# Patient Record
Sex: FEMALE | Race: ASIAN | NOT HISPANIC OR LATINO | Employment: STUDENT | URBAN - METROPOLITAN AREA
[De-identification: names, ages, dates, MRNs, and addresses within clinical notes are randomized per-mention and may not be internally consistent; named-entity substitution may affect disease eponyms.]

---
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
## 2019-03-18 ENCOUNTER — HOSPITAL ENCOUNTER (OUTPATIENT)
Dept: GENERAL RADIOLOGY | Facility: CLINIC | Age: 19
End: 2019-03-18
Attending: PEDIATRICS
Payer: COMMERCIAL

## 2019-03-18 ENCOUNTER — OFFICE VISIT (OUTPATIENT)
Dept: RHEUMATOLOGY | Facility: CLINIC | Age: 19
End: 2019-03-18
Attending: PEDIATRICS
Payer: COMMERCIAL

## 2019-03-18 ENCOUNTER — HOSPITAL ENCOUNTER (OUTPATIENT)
Dept: GENERAL RADIOLOGY | Facility: CLINIC | Age: 19
Discharge: HOME | End: 2019-03-18
Attending: PEDIATRICS | Admitting: PEDIATRICS
Payer: COMMERCIAL

## 2019-03-18 VITALS
DIASTOLIC BLOOD PRESSURE: 77 MMHG | WEIGHT: 138.45 LBS | TEMPERATURE: 98.2 F | HEART RATE: 78 BPM | SYSTOLIC BLOOD PRESSURE: 122 MMHG | BODY MASS INDEX: 24.53 KG/M2 | HEIGHT: 63 IN

## 2019-03-18 DIAGNOSIS — M05.79 RHEUMATOID ARTHRITIS INVOLVING MULTIPLE SITES WITH POSITIVE RHEUMATOID FACTOR (H): Chronic | ICD-10-CM

## 2019-03-18 DIAGNOSIS — M05.79 RHEUMATOID ARTHRITIS INVOLVING MULTIPLE SITES WITH POSITIVE RHEUMATOID FACTOR (H): Primary | Chronic | ICD-10-CM

## 2019-03-18 LAB
ALBUMIN SERPL-MCNC: 3.9 G/DL (ref 3.4–5)
ALP SERPL-CCNC: 65 U/L (ref 40–150)
ALT SERPL W P-5'-P-CCNC: 13 U/L (ref 0–50)
AST SERPL W P-5'-P-CCNC: 13 U/L (ref 0–35)
BASOPHILS # BLD AUTO: 0.1 10E9/L (ref 0–0.2)
BASOPHILS NFR BLD AUTO: 1.2 %
BILIRUB DIRECT SERPL-MCNC: 0.1 MG/DL (ref 0–0.2)
BILIRUB SERPL-MCNC: 0.4 MG/DL (ref 0.2–1.3)
CREAT SERPL-MCNC: 0.65 MG/DL (ref 0.5–1)
CRP SERPL-MCNC: <2.9 MG/L (ref 0–8)
DIFFERENTIAL METHOD BLD: NORMAL
EOSINOPHIL # BLD AUTO: 0.2 10E9/L (ref 0–0.7)
EOSINOPHIL NFR BLD AUTO: 3 %
ERYTHROCYTE [DISTWIDTH] IN BLOOD BY AUTOMATED COUNT: 12.2 % (ref 10–15)
ERYTHROCYTE [SEDIMENTATION RATE] IN BLOOD BY WESTERGREN METHOD: 28 MM/H (ref 0–20)
GFR SERPL CREATININE-BSD FRML MDRD: >90 ML/MIN/{1.73_M2}
HCT VFR BLD AUTO: 41 % (ref 35–47)
HGB BLD-MCNC: 13.4 G/DL (ref 11.7–15.7)
IMM GRANULOCYTES # BLD: 0 10E9/L (ref 0–0.4)
IMM GRANULOCYTES NFR BLD: 0.2 %
LYMPHOCYTES # BLD AUTO: 2 10E9/L (ref 0.8–5.3)
LYMPHOCYTES NFR BLD AUTO: 30.9 %
MCH RBC QN AUTO: 29.4 PG (ref 26.5–33)
MCHC RBC AUTO-ENTMCNC: 32.7 G/DL (ref 31.5–36.5)
MCV RBC AUTO: 90 FL (ref 78–100)
MONOCYTES # BLD AUTO: 0.7 10E9/L (ref 0–1.3)
MONOCYTES NFR BLD AUTO: 10.8 %
NEUTROPHILS # BLD AUTO: 3.5 10E9/L (ref 1.6–8.3)
NEUTROPHILS NFR BLD AUTO: 53.9 %
NRBC # BLD AUTO: 0 10*3/UL
NRBC BLD AUTO-RTO: 0 /100
PLATELET # BLD AUTO: 311 10E9/L (ref 150–450)
PROT SERPL-MCNC: 8.7 G/DL (ref 6.8–8.8)
RBC # BLD AUTO: 4.56 10E12/L (ref 3.8–5.2)
WBC # BLD AUTO: 6.6 10E9/L (ref 4–11)

## 2019-03-18 PROCEDURE — 73120 X-RAY EXAM OF HAND: CPT | Mod: 50,52

## 2019-03-18 PROCEDURE — 85652 RBC SED RATE AUTOMATED: CPT | Performed by: PEDIATRICS

## 2019-03-18 PROCEDURE — 82565 ASSAY OF CREATININE: CPT | Performed by: PEDIATRICS

## 2019-03-18 PROCEDURE — 86431 RHEUMATOID FACTOR QUANT: CPT | Performed by: PEDIATRICS

## 2019-03-18 PROCEDURE — 86140 C-REACTIVE PROTEIN: CPT | Performed by: PEDIATRICS

## 2019-03-18 PROCEDURE — 80076 HEPATIC FUNCTION PANEL: CPT | Performed by: PEDIATRICS

## 2019-03-18 PROCEDURE — 73620 X-RAY EXAM OF FOOT: CPT | Mod: 50,52

## 2019-03-18 PROCEDURE — G0463 HOSPITAL OUTPT CLINIC VISIT: HCPCS | Mod: ZF

## 2019-03-18 PROCEDURE — 85025 COMPLETE CBC W/AUTO DIFF WBC: CPT | Performed by: PEDIATRICS

## 2019-03-18 PROCEDURE — 36415 COLL VENOUS BLD VENIPUNCTURE: CPT | Performed by: PEDIATRICS

## 2019-03-18 PROCEDURE — 82784 ASSAY IGA/IGD/IGG/IGM EACH: CPT | Performed by: PEDIATRICS

## 2019-03-18 PROCEDURE — 86200 CCP ANTIBODY: CPT | Performed by: PEDIATRICS

## 2019-03-18 ASSESSMENT — PAIN SCALES - GENERAL: PAINLEVEL: NO PAIN (0)

## 2019-03-18 ASSESSMENT — MIFFLIN-ST. JEOR: SCORE: 1378.87

## 2019-03-18 NOTE — NURSING NOTE
"Chief Complaint   Patient presents with     RECHECK     Follow up GENE     /77 (BP Location: Right arm, Patient Position: Sitting, Cuff Size: Adult Regular)   Pulse 78   Temp 98.2  F (36.8  C) (Oral)   Ht 5' 3.43\" (161.1 cm)   Wt 138 lb 7.2 oz (62.8 kg)   BMI 24.20 kg/m    Jodi Arnold LPN    "

## 2019-03-18 NOTE — LETTER
3/18/2019      RE: Geri [facility]OMSON [facility]  [address]  [place] [address]           Rheumatology History:   Date of symptom onset:  2/14/2016  Date of first visit to center:  3/7/2016  Date of GENE diagnosis:  3/7/2016  ILAR category:  polyarticular (RF-positive)  KAYLEEN Status:  . 3/18/2019   KAYLEEN Status Positive     RF Status:  . 3/18/2019   Rheumatoid Factor Status Positive     HLA-B27 Status:  . 3/18/2019   HLA-B27 Status Not tested           Ophthalmology History:   Iritis/Uveitis Comorbidity:  . 3/18/2019   (COIN) Iritis/Uveitis comorbidity? No     Date of last eye exam: 12/17/2018  In compliance with eye screening (y/n):  Yes         Medications:   As of completion of this visit:  Current Outpatient Medications   Medication Sig Dispense Refill     Multiple Vitamins-Minerals (MULTIVITAMIN PO)        Omega-3 Fatty Acids (OMEGA 3 PO)        Etanercept (ENBREL SURECLICK) 50 MG/ML autoinjector Inject 50 mg Subcutaneous once a week (Patient not taking: Reported on 3/18/2019) 4 mL 11     folic acid (FOLVITE) 1 MG tablet Take 1 tablet (1 mg) by mouth daily (Patient not taking: Reported on 11/21/2018) 30 tablet 11     methotrexate 2.5 MG tablet CHEMO Take 6 tablets (15 mg) by mouth once a week (Patient not taking: Reported on 3/18/2019) 24 tablet 3     Date of last TB Screen:  7/30/2018         Allergies:   No Known Allergies        Problem list:     Patient Active Problem List    Diagnosis Date Noted     Cyclic citrullinated peptide (CCP) antibody positive 03/07/2016     Positive antinuclear antibody 03/07/2016     High total IgG 03/07/2016     Elevated rheumatoid factor 02/24/2016     Rheumatoid arthritis involving multiple sites with positive rheumatoid factor (H) 02/24/2016            Subjective:   Geri is a 18 year old female who was seen in Pediatric Rheumatology clinic today for follow up.  Geri was last seen in our clinic on 11/21/2018 and returns today accompanied by her parents.  The encounter diagnosis was  "Rheumatoid arthritis involving multiple sites with positive rheumatoid factor (H).      Goals for the visit include discussion of medications.  Prescribed medications were discontinued by late November or early December.  She did well until late January or early February when she had some breakthrough symptoms involving her left elbow, left index finger, and left ankle, including swelling.  She took 1 dose of methotrexate and has done well since that time.    She had a follow-up examination of her eyes and things were reassuring.  Is otherwise been doing great.  She completed our comprehensive Review of Systems form and it is otherwise negative.  College continues to go well.    Information per our standardized questionnaire is as below:   Self Report  (COIN) Patient Pain Status: 0  (COIN) Patient Global Assessment Of Disease Activity: 1  Score Reported By: Self  (COIN) Patient Highest Level Of Education: some college or technical school  Arthritis History  (COIN) Morning stiffness in the past week: no stiffness  Has your arthritis stopped from trying any athletic or rigorous activities, or interfaced with your ability to do these activities: No  Have you been limited your ability to do normal daily activities in the past week: No  Did you needed help from other people to do normal activities in the past week: No  Have you used any aids or devices to help you do normal daily activities in the past week: No  Important Medical Events  (COIN) Patient has experienced drug-related serious adverse events since last encounter?: No         Examination:   Blood pressure 122/77, pulse 78, temperature 98.2  F (36.8  C), temperature source Oral, height 1.611 m (5' 3.43\"), weight 62.8 kg (138 lb 7.2 oz).  68 %ile based on CDC (Girls, 2-20 Years) weight-for-age data based on Weight recorded on 3/18/2019.  Blood pressure percentiles are not available for patients who are 18 years or older.    Geri appears generally well and in " good spirits.  Head: Normal head and hair.  Eyes: No scleral injection, pupils normal.  Ears: Normal external structures, tympanic membranes.  Nose: No cartilage deformity, congestion.  Mouth: Normal teeth, gums, tongue, mucosa.  Throat: Normal, without erythema or exudate.  Neck: Normal, without thyromegaly  Nodes: No cervical, supraclavicular, axillary, inguinal adenopathy.  Lungs: Normal effort, clear to ausculation.  Heart: Regular rate and rhythm, S1 and S2, no murmurs; normal peripheral pulses and perfusion.  Abdomen: Soft, non-tender, without hepatomegaly, splenomegaly, or masses.  Skin: No inflammatory lesions.  Normal scratches and bruises.  Nails: No pitting, infection.  Neurological: Alert, appropriately interactive, normal cranial nerves, no deficits, normal gait walking.   Musculoskeletal: No evidence of current synovitis of the cervical spine, TMJ, sternoclavicular, acromioclavicular, glenohumeral, elbow, wrist, lumbar spine, hip, knee, ankle joints. No tendonitis or bursitis.     Axial Skeleton  (COIN) Sacroiliac tenderness:: No  (COIN) Positive ASHLEY test:: No  (COIN) Modified Schober's Test:: No  Upper Extremity  Thumb MCP: R Swollen  Index MCP: R Swollen  Index PIP: R Swollen;L Swollen  Middle MCP: R Swollen  Lower Extremity  Third PIP: L Swollen  Fourth PIP: R Swollen  Entheses  (COIN) Tender Entheses count: 0    Total active joints:  2(only toe findings are definite)  Total limited joints:  0  Tender entheses count:  0           Assessment:     Rheumatoid factor and CCP antibody positive rheumatoid arthritis, with a history of erosive changes.  I think it is time to get a new set of baseline films and make sure that there has not been progression that might warrant immediate start of therapy, as well as to provide a baseline for comparison down the road.  I think getting laboratory studies to look at immune activation and inflammation will also be important.  We know that she is continued to make  CCP antibody at a very high level, but her rheumatoid factor has been low, her serum IgG has been normal, and the nonspecific markers of inflammation such as C-reactive protein and sedimentation rate have been reassuringly normal.  If there is been a significant change in any of these, whether radiographs or labs, we may need to consider restarting therapy even though she feels fine.      Again, I reviewed that that this particular form of arthritis has only about 2% of patients were able to stop their therapy for a prolonged period of time successfully without a recurrence of disease or progression.  I reviewed the progression can be clinically silent and only apparent radiographically for significant periods of time, but there will be unwelcome consequences if there is prolonged poor control.    Change Since Last Visit: Somewhat Worse  ACR Functional Class: Normal  (COIN) Provider Global Assessment Of Disease Activity: 1  (This is measured on the scale of 0 - 10)  [id number]  (This is measured on the scale of 0 - 30)  (COIN) On Medication For Treatment Of GENE?: No  Health counseling reviewed:  treatment adherence       Plan:     1. Laboratory monitoring today to assess inflammation and immune activation.  2. Radiographs today of the areas that have previously been affected and which have suspicious findings today..  3. Continue eye examinations for uveitis monitoring every 12 months.  4. Physical activity as tolerated.  What one can do tells us how well someone is doing, and is healthy for individuals with arthritis.   5. Medications will not be restarted for the time being.  6. Follow-up in 5-6 months.       If there are any new questions or concerns, I would be glad to help and can be reached through our main office at [phone] or our paging  at [phone].    Hardy Reese MD         Addendum:  Imaging Results:     Recent Results (from the past 24 hour(s))   XR Hand Bilateral 1 vw (AP)     Narrative    Exam: XR HAND BILATERAL 1 VW  3/18/2019 11:21 AM      History: Evaluate for erosions; Rheumatoid arthritis involving  multiple sites with positive rheumatoid factor (H)    Comparison: 3/29/2017. 3/7/2016    Findings: PA view of the hands and wrists. Patient is skeletally  mature. There is no fracture or acute osseous abnormality. There are  symmetric irregularities along the waist of the scaphoid, likely  normal variation. Lucency in the right second metacarpal head is  unchanged. No soft tissue swelling.      Impression    Impression: Stable radiograph of the hands. No acute osseous  abnormality.    VIPUL ROCA MD   XR Foot Bilateral 1 View    Narrative    Exam: XR FOOT BILATERAL 1 VW  3/18/2019 11:22 AM      History: Evaluate for erosions; Rheumatoid arthritis involving  multiple sites with positive rheumatoid factor (H)    Comparison: 3/29/2017 3/7/2016    Findings: AP view of the feet. Patient is skeletally mature. As seen  on the prior exam there are small periarticular erosions about the  right fifth metatarsal head. Defect along the medial aspect of the  first digit proximal phalanx is unchanged compared to 2016. No joint  space narrowing appreciated and no additional erosive change is  identified. Focal lucency in the distal aspect of the left fifth  metatarsal head is unchanged. No substantial soft tissue swelling.      Impression    Impression:   1. Continued erosive change of the fifth metatarsal head with stable  focal presumed erosion of the first digit proximal phalanx.  2. No new osseous abnormality.    VIPUL ROCA MD   These results are reassuring.          Addendum:  Laboratory Investigations:   Laboratory investigations performed today for which results were available at the time of this note are listed below.  Pending labs will be reported in a separate letter.  Results for orders placed or performed in visit on 03/18/19   CBC with platelets differential   Result Value Ref  Range    WBC 6.6 4.0 - 11.0 10e9/L    RBC Count 4.56 3.8 - 5.2 10e12/L    Hemoglobin 13.4 11.7 - 15.7 g/dL    Hematocrit 41.0 35.0 - 47.0 %    MCV 90 78 - 100 fl    MCH 29.4 26.5 - 33.0 pg    MCHC 32.7 31.5 - 36.5 g/dL    RDW 12.2 10.0 - 15.0 %    Platelet Count 311 150 - 450 10e9/L    Diff Method Automated Method     % Neutrophils 53.9 %    % Lymphocytes 30.9 %    % Monocytes 10.8 %    % Eosinophils 3.0 %    % Basophils 1.2 %    % Immature Granulocytes 0.2 %    Nucleated RBCs 0 0 /100    Absolute Neutrophil 3.5 1.6 - 8.3 10e9/L    Absolute Lymphocytes 2.0 0.8 - 5.3 10e9/L    Absolute Monocytes 0.7 0.0 - 1.3 10e9/L    Absolute Eosinophils 0.2 0.0 - 0.7 10e9/L    Absolute Basophils 0.1 0.0 - 0.2 10e9/L    Abs Immature Granulocytes 0.0 0 - 0.4 10e9/L    Absolute Nucleated RBC 0.0    Erythrocyte sedimentation rate auto   Result Value Ref Range    Sed Rate 28 (H) 0 - 20 mm/h   CRP inflammation   Result Value Ref Range    CRP Inflammation <2.9 0.0 - 8.0 mg/L   Hepatic panel   Result Value Ref Range    Bilirubin Direct 0.1 0.0 - 0.2 mg/dL    Bilirubin Total 0.4 0.2 - 1.3 mg/dL    Albumin 3.9 3.4 - 5.0 g/dL    Protein Total 8.7 6.8 - 8.8 g/dL    Alkaline Phosphatase 65 40 - 150 U/L    ALT 13 0 - 50 U/L    AST 13 0 - 35 U/L   Creatinine   Result Value Ref Range    Creatinine 0.65 0.50 - 1.00 mg/dL    GFR Estimate >90 >60 mL/min/[1.73_m2]    GFR Estimate If Black >90 >60 mL/min/[1.73_m2]      The preliminary results are notable only for a modest increase in inflammation as reflected by the sedimentation rate.        CC  Patient Care Team:  Elieser Sena MD as PCP - General (Pediatrics)  Hardy Reese MD as MD (Pediatric Rheumatology)  Cornelius Leonardo MD as MD (Ophthalmology)  ELIESER SENA    Copy to patient  Kaci Valadez,Delbert  [address] [place] [facility] [address]

## 2019-03-18 NOTE — PROGRESS NOTES
Rheumatology History:   Date of symptom onset:  2/14/2016  Date of first visit to center:  3/7/2016  Date of GENE diagnosis:  3/7/2016  ILAR category:  polyarticular (RF-positive)  KAYLEEN Status:  . 3/18/2019   KAYLEEN Status Positive     RF Status:  . 3/18/2019   Rheumatoid Factor Status Positive     HLA-B27 Status:  . 3/18/2019   HLA-B27 Status Not tested           Ophthalmology History:   Iritis/Uveitis Comorbidity:  . 3/18/2019   (COIN) Iritis/Uveitis comorbidity? No     Date of last eye exam: 12/17/2018  In compliance with eye screening (y/n):  Yes         Medications:   As of completion of this visit:  Current Outpatient Medications   Medication Sig Dispense Refill     Multiple Vitamins-Minerals (MULTIVITAMIN PO)        Omega-3 Fatty Acids (OMEGA 3 PO)        Etanercept (ENBREL SURECLICK) 50 MG/ML autoinjector Inject 50 mg Subcutaneous once a week (Patient not taking: Reported on 3/18/2019) 4 mL 11     folic acid (FOLVITE) 1 MG tablet Take 1 tablet (1 mg) by mouth daily (Patient not taking: Reported on 11/21/2018) 30 tablet 11     methotrexate 2.5 MG tablet CHEMO Take 6 tablets (15 mg) by mouth once a week (Patient not taking: Reported on 3/18/2019) 24 tablet 3     Date of last TB Screen:  7/30/2018         Allergies:   No Known Allergies        Problem list:     Patient Active Problem List    Diagnosis Date Noted     Cyclic citrullinated peptide (CCP) antibody positive 03/07/2016     Positive antinuclear antibody 03/07/2016     High total IgG 03/07/2016     Elevated rheumatoid factor 02/24/2016     Rheumatoid arthritis involving multiple sites with positive rheumatoid factor (H) 02/24/2016            Subjective:   Geri is a 18 year old female who was seen in Pediatric Rheumatology clinic today for follow up.  Geri was last seen in our clinic on 11/21/2018 and returns today accompanied by her parents.  The encounter diagnosis was Rheumatoid arthritis involving multiple sites with positive rheumatoid factor  "(H).      Goals for the visit include discussion of medications.  Prescribed medications were discontinued by late November or early December.  She did well until late January or early February when she had some breakthrough symptoms involving her left elbow, left index finger, and left ankle, including swelling.  She took 1 dose of methotrexate and has done well since that time.    She had a follow-up examination of her eyes and things were reassuring.  Is otherwise been doing great.  She completed our comprehensive Review of Systems form and it is otherwise negative.  College continues to go well.    Information per our standardized questionnaire is as below:   Self Report  (COIN) Patient Pain Status: 0  (COIN) Patient Global Assessment Of Disease Activity: 1  Score Reported By: Self  (COIN) Patient Highest Level Of Education: some college or technical school  Arthritis History  (COIN) Morning stiffness in the past week: no stiffness  Has your arthritis stopped from trying any athletic or rigorous activities, or interfaced with your ability to do these activities: No  Have you been limited your ability to do normal daily activities in the past week: No  Did you needed help from other people to do normal activities in the past week: No  Have you used any aids or devices to help you do normal daily activities in the past week: No  Important Medical Events  (COIN) Patient has experienced drug-related serious adverse events since last encounter?: No         Examination:   Blood pressure 122/77, pulse 78, temperature 98.2  F (36.8  C), temperature source Oral, height 1.611 m (5' 3.43\"), weight 62.8 kg (138 lb 7.2 oz).  68 %ile based on CDC (Girls, 2-20 Years) weight-for-age data based on Weight recorded on 3/18/2019.  Blood pressure percentiles are not available for patients who are 18 years or older.    Geri appears generally well and in good spirits.  Head: Normal head and hair.  Eyes: No scleral injection, pupils " normal.  Ears: Normal external structures, tympanic membranes.  Nose: No cartilage deformity, congestion.  Mouth: Normal teeth, gums, tongue, mucosa.  Throat: Normal, without erythema or exudate.  Neck: Normal, without thyromegaly  Nodes: No cervical, supraclavicular, axillary, inguinal adenopathy.  Lungs: Normal effort, clear to ausculation.  Heart: Regular rate and rhythm, S1 and S2, no murmurs; normal peripheral pulses and perfusion.  Abdomen: Soft, non-tender, without hepatomegaly, splenomegaly, or masses.  Skin: No inflammatory lesions.  Normal scratches and bruises.  Nails: No pitting, infection.  Neurological: Alert, appropriately interactive, normal cranial nerves, no deficits, normal gait walking.   Musculoskeletal: No evidence of current synovitis of the cervical spine, TMJ, sternoclavicular, acromioclavicular, glenohumeral, elbow, wrist, lumbar spine, hip, knee, ankle joints. No tendonitis or bursitis.     Axial Skeleton  (COIN) Sacroiliac tenderness:: No  (COIN) Positive ASHLEY test:: No  (COIN) Modified Schober's Test:: No  Upper Extremity  Thumb MCP: R Swollen  Index MCP: R Swollen  Index PIP: R Swollen;L Swollen  Middle MCP: R Swollen  Lower Extremity  Third PIP: L Swollen  Fourth PIP: R Swollen  Entheses  (COIN) Tender Entheses count: 0    Total active joints:  2(only toe findings are definite)  Total limited joints:  0  Tender entheses count:  0           Assessment:     Rheumatoid factor and CCP antibody positive rheumatoid arthritis, with a history of erosive changes.  I think it is time to get a new set of baseline films and make sure that there has not been progression that might warrant immediate start of therapy, as well as to provide a baseline for comparison down the road.  I think getting laboratory studies to look at immune activation and inflammation will also be important.  We know that she is continued to make CCP antibody at a very high level, but her rheumatoid factor has been low, her  serum IgG has been normal, and the nonspecific markers of inflammation such as C-reactive protein and sedimentation rate have been reassuringly normal.  If there is been a significant change in any of these, whether radiographs or labs, we may need to consider restarting therapy even though she feels fine.      Again, I reviewed that that this particular form of arthritis has only about 2% of patients were able to stop their therapy for a prolonged period of time successfully without a recurrence of disease or progression.  I reviewed the progression can be clinically silent and only apparent radiographically for significant periods of time, but there will be unwelcome consequences if there is prolonged poor control.    Change Since Last Visit: Somewhat Worse  ACR Functional Class: Normal  (COIN) Provider Global Assessment Of Disease Activity: 1  (This is measured on the scale of 0 - 10)  [id number]  (This is measured on the scale of 0 - 30)  (COIN) On Medication For Treatment Of GENE?: No  Health counseling reviewed:  treatment adherence       Plan:     1. Laboratory monitoring today to assess inflammation and immune activation.  2. Radiographs today of the areas that have previously been affected and which have suspicious findings today..  3. Continue eye examinations for uveitis monitoring every 12 months.  4. Physical activity as tolerated.  What one can do tells us how well someone is doing, and is healthy for individuals with arthritis.   5. Medications will not be restarted for the time being.  6. Follow-up in 5-6 months.       If there are any new questions or concerns, I would be glad to help and can be reached through our main office at [phone] or our paging  at [phone].    Hardy Reese MD         Addendum:  Imaging Results:     Recent Results (from the past 24 hour(s))   XR Hand Bilateral 1 vw (AP)    Narrative    Exam: XR HAND BILATERAL 1 VW  3/18/2019 11:21 AM      History: Evaluate  for erosions; Rheumatoid arthritis involving  multiple sites with positive rheumatoid factor (H)    Comparison: 3/29/2017. 3/7/2016    Findings: PA view of the hands and wrists. Patient is skeletally  mature. There is no fracture or acute osseous abnormality. There are  symmetric irregularities along the waist of the scaphoid, likely  normal variation. Lucency in the right second metacarpal head is  unchanged. No soft tissue swelling.      Impression    Impression: Stable radiograph of the hands. No acute osseous  abnormality.    VIPUL ROCA MD   XR Foot Bilateral 1 View    Narrative    Exam: XR FOOT BILATERAL 1 VW  3/18/2019 11:22 AM      History: Evaluate for erosions; Rheumatoid arthritis involving  multiple sites with positive rheumatoid factor (H)    Comparison: 3/29/2017 3/7/2016    Findings: AP view of the feet. Patient is skeletally mature. As seen  on the prior exam there are small periarticular erosions about the  right fifth metatarsal head. Defect along the medial aspect of the  first digit proximal phalanx is unchanged compared to 2016. No joint  space narrowing appreciated and no additional erosive change is  identified. Focal lucency in the distal aspect of the left fifth  metatarsal head is unchanged. No substantial soft tissue swelling.      Impression    Impression:   1. Continued erosive change of the fifth metatarsal head with stable  focal presumed erosion of the first digit proximal phalanx.  2. No new osseous abnormality.    VIPUL ROCA MD   These results are reassuring.          Addendum:  Laboratory Investigations:   Laboratory investigations performed today for which results were available at the time of this note are listed below.  Pending labs will be reported in a separate letter.  Results for orders placed or performed in visit on 03/18/19   CBC with platelets differential   Result Value Ref Range    WBC 6.6 4.0 - 11.0 10e9/L    RBC Count 4.56 3.8 - 5.2 10e12/L    Hemoglobin  13.4 11.7 - 15.7 g/dL    Hematocrit 41.0 35.0 - 47.0 %    MCV 90 78 - 100 fl    MCH 29.4 26.5 - 33.0 pg    MCHC 32.7 31.5 - 36.5 g/dL    RDW 12.2 10.0 - 15.0 %    Platelet Count 311 150 - 450 10e9/L    Diff Method Automated Method     % Neutrophils 53.9 %    % Lymphocytes 30.9 %    % Monocytes 10.8 %    % Eosinophils 3.0 %    % Basophils 1.2 %    % Immature Granulocytes 0.2 %    Nucleated RBCs 0 0 /100    Absolute Neutrophil 3.5 1.6 - 8.3 10e9/L    Absolute Lymphocytes 2.0 0.8 - 5.3 10e9/L    Absolute Monocytes 0.7 0.0 - 1.3 10e9/L    Absolute Eosinophils 0.2 0.0 - 0.7 10e9/L    Absolute Basophils 0.1 0.0 - 0.2 10e9/L    Abs Immature Granulocytes 0.0 0 - 0.4 10e9/L    Absolute Nucleated RBC 0.0    Erythrocyte sedimentation rate auto   Result Value Ref Range    Sed Rate 28 (H) 0 - 20 mm/h   CRP inflammation   Result Value Ref Range    CRP Inflammation <2.9 0.0 - 8.0 mg/L   Hepatic panel   Result Value Ref Range    Bilirubin Direct 0.1 0.0 - 0.2 mg/dL    Bilirubin Total 0.4 0.2 - 1.3 mg/dL    Albumin 3.9 3.4 - 5.0 g/dL    Protein Total 8.7 6.8 - 8.8 g/dL    Alkaline Phosphatase 65 40 - 150 U/L    ALT 13 0 - 50 U/L    AST 13 0 - 35 U/L   Creatinine   Result Value Ref Range    Creatinine 0.65 0.50 - 1.00 mg/dL    GFR Estimate >90 >60 mL/min/[1.73_m2]    GFR Estimate If Black >90 >60 mL/min/[1.73_m2]      The preliminary results are notable only for a modest increase in inflammation as reflected by the sedimentation rate.        CC  Patient Care Team:  Elieser Sena MD as PCP - General (Pediatrics)  Hardy Reese MD as MD (Pediatric Rheumatology)  Cornelius Leonardo MD as MD (Ophthalmology)  ELIESER SENA    Copy to patient  Rory,Delbert Vickers  [address] [facility] [address]

## 2019-03-18 NOTE — PATIENT INSTRUCTIONS
[facility] Pediatric Rheumatology    For Help:  The Pediatric Call Center at [phone] can help with scheduling of routine follow up visits.  Vee Johnson and Antonia Owens are the Nurse Coordinators for the Division of Pediatric Rheumatology and can be reached directly at [phone]. They can help with questions about your child s rheumatic condition, medications, and test results.   Please try to schedule infusions 3 months in advance.  Please try to give us 72 hours or longer notice if you need to cancel infusions so other patients can benefit from this opening).  Note: Insurance authorization must be obtained before any infusion can be scheduled. If you change health insurance, you must notify our office as soon as possible, so that the infusion can be reauthorized.    For emergencies after hours or on the weekends, please call the page  at [phone] and ask to speak to the physician on-call for Pediatric Rheumatology. Please do not use [email or web address] for urgent requests.  Main  Services:  [phone]  o Hmong/Canadian/Armenian: [phone]  o Jamaican: [phone]  o Chinese: [phone]

## 2019-03-18 NOTE — LETTER
3/18/2019      RE: Geri THOMSON [facility]  [address]  [place] [address]           Rheumatology History:   Date of symptom onset:  2/14/2016  Date of first visit to center:  3/7/2016  Date of GENE diagnosis:  3/7/2016  ILAR category:  polyarticular (RF-positive)  KAYLEEN Status:  . 3/18/2019   KAYLEEN Status Positive     RF Status:  . 3/18/2019   Rheumatoid Factor Status Positive     HLA-B27 Status:  . 3/18/2019   HLA-B27 Status Not tested           Ophthalmology History:   Iritis/Uveitis Comorbidity:  . 3/18/2019   (COIN) Iritis/Uveitis comorbidity? No     Date of last eye exam: 12/17/2018  In compliance with eye screening (y/n):  Yes         Medications:   As of completion of this visit:  Current Outpatient Medications   Medication Sig Dispense Refill     Multiple Vitamins-Minerals (MULTIVITAMIN PO)        Omega-3 Fatty Acids (OMEGA 3 PO)        Etanercept (ENBREL SURECLICK) 50 MG/ML autoinjector Inject 50 mg Subcutaneous once a week (Patient not taking: Reported on 3/18/2019) 4 mL 11     folic acid (FOLVITE) 1 MG tablet Take 1 tablet (1 mg) by mouth daily (Patient not taking: Reported on 11/21/2018) 30 tablet 11     methotrexate 2.5 MG tablet CHEMO Take 6 tablets (15 mg) by mouth once a week (Patient not taking: Reported on 3/18/2019) 24 tablet 3     Date of last TB Screen:  7/30/2018         Allergies:   No Known Allergies        Problem list:     Patient Active Problem List    Diagnosis Date Noted     Cyclic citrullinated peptide (CCP) antibody positive 03/07/2016     Positive antinuclear antibody 03/07/2016     High total IgG 03/07/2016     Elevated rheumatoid factor 02/24/2016     Rheumatoid arthritis involving multiple sites with positive rheumatoid factor (H) 02/24/2016            Subjective:   Geri is a 18 year old female who was seen in Pediatric Rheumatology clinic today for follow up.  Geri was last seen in our clinic on 11/21/2018 and returns today accompanied by her parents.  The encounter diagnosis was  "Rheumatoid arthritis involving multiple sites with positive rheumatoid factor (H).      Goals for the visit include discussion of medications.  Prescribed medications were discontinued by late November or early December.  She did well until late January or early February when she had some breakthrough symptoms involving her left elbow, left index finger, and left ankle, including swelling.  She took 1 dose of methotrexate and has done well since that time.    She had a follow-up examination of her eyes and things were reassuring.  Is otherwise been doing great.  She completed our comprehensive Review of Systems form and it is otherwise negative.  College continues to go well.    Information per our standardized questionnaire is as below:   Self Report  (COIN) Patient Pain Status: 0  (COIN) Patient Global Assessment Of Disease Activity: 1  Score Reported By: Self  (COIN) Patient Highest Level Of Education: some college or technical school  Arthritis History  (COIN) Morning stiffness in the past week: no stiffness  Has your arthritis stopped from trying any athletic or rigorous activities, or interfaced with your ability to do these activities: No  Have you been limited your ability to do normal daily activities in the past week: No  Did you needed help from other people to do normal activities in the past week: No  Have you used any aids or devices to help you do normal daily activities in the past week: No  Important Medical Events  (COIN) Patient has experienced drug-related serious adverse events since last encounter?: No         Examination:   Blood pressure 122/77, pulse 78, temperature 98.2  F (36.8  C), temperature source Oral, height 1.611 m (5' 3.43\"), weight 62.8 kg (138 lb 7.2 oz).  68 %ile based on CDC (Girls, 2-20 Years) weight-for-age data based on Weight recorded on 3/18/2019.  Blood pressure percentiles are not available for patients who are 18 years or older.    Geri appears generally well and in " good spirits.  Head: Normal head and hair.  Eyes: No scleral injection, pupils normal.  Ears: Normal external structures, tympanic membranes.  Nose: No cartilage deformity, congestion.  Mouth: Normal teeth, gums, tongue, mucosa.  Throat: Normal, without erythema or exudate.  Neck: Normal, without thyromegaly  Nodes: No cervical, supraclavicular, axillary, inguinal adenopathy.  Lungs: Normal effort, clear to ausculation.  Heart: Regular rate and rhythm, S1 and S2, no murmurs; normal peripheral pulses and perfusion.  Abdomen: Soft, non-tender, without hepatomegaly, splenomegaly, or masses.  Skin: No inflammatory lesions.  Normal scratches and bruises.  Nails: No pitting, infection.  Neurological: Alert, appropriately interactive, normal cranial nerves, no deficits, normal gait walking.   Musculoskeletal: No evidence of current synovitis of the cervical spine, TMJ, sternoclavicular, acromioclavicular, glenohumeral, elbow, wrist, lumbar spine, hip, knee, ankle joints. No tendonitis or bursitis.     Axial Skeleton  (COIN) Sacroiliac tenderness:: No  (COIN) Positive ASHLEY test:: No  (COIN) Modified Schober's Test:: No  Upper Extremity  Thumb MCP: R Swollen  Index MCP: R Swollen  Index PIP: R Swollen;L Swollen  Middle MCP: R Swollen  Lower Extremity  Third PIP: L Swollen  Fourth PIP: R Swollen  Entheses  (COIN) Tender Entheses count: 0    Total active joints:  2(only toe findings are definite)  Total limited joints:  0  Tender entheses count:  0           Assessment:     Rheumatoid factor and CCP antibody positive rheumatoid arthritis, with a history of erosive changes.  I think it is time to get a new set of baseline films and make sure that there has not been progression that might warrant immediate start of therapy, as well as to provide a baseline for comparison down the road.  I think getting laboratory studies to look at immune activation and inflammation will also be important.  We know that she is continued to make  CCP antibody at a very high level, but her rheumatoid factor has been low, her serum IgG has been normal, and the nonspecific markers of inflammation such as C-reactive protein and sedimentation rate have been reassuringly normal.  If there is been a significant change in any of these, whether radiographs or labs, we may need to consider restarting therapy even though she feels fine.      Again, I reviewed that that this particular form of arthritis has only about 2% of patients were able to stop their therapy for a prolonged period of time successfully without a recurrence of disease or progression.  I reviewed the progression can be clinically silent and only apparent radiographically for significant periods of time, but there will be unwelcome consequences if there is prolonged poor control.    Change Since Last Visit: Somewhat Worse  ACR Functional Class: Normal  (COIN) Provider Global Assessment Of Disease Activity: 1  (This is measured on the scale of 0 - 10)  [id number]  (This is measured on the scale of 0 - 30)  (COIN) On Medication For Treatment Of GENE?: No  Health counseling reviewed:  treatment adherence       Plan:     1. Laboratory monitoring today to assess inflammation and immune activation.  2. Radiographs today of the areas that have previously been affected and which have suspicious findings today..  3. Continue eye examinations for uveitis monitoring every 12 months.  4. Physical activity as tolerated.  What one can do tells us how well someone is doing, and is healthy for individuals with arthritis.   5. Medications will not be restarted for the time being.  6. Follow-up in 5-6 months.       If there are any new questions or concerns, I would be glad to help and can be reached through our main office at [phone] or our paging  at [phone].    Hardy Reese MD         Addendum:  Imaging Results:     Recent Results (from the past 24 hour(s))   XR Hand Bilateral 1 vw (AP)     Narrative    Exam: XR HAND BILATERAL 1 VW  3/18/2019 11:21 AM      History: Evaluate for erosions; Rheumatoid arthritis involving  multiple sites with positive rheumatoid factor (H)    Comparison: 3/29/2017. 3/7/2016    Findings: PA view of the hands and wrists. Patient is skeletally  mature. There is no fracture or acute osseous abnormality. There are  symmetric irregularities along the waist of the scaphoid, likely  normal variation. Lucency in the right second metacarpal head is  unchanged. No soft tissue swelling.      Impression    Impression: Stable radiograph of the hands. No acute osseous  abnormality.    VIPUL ROCA MD   XR Foot Bilateral 1 View    Narrative    Exam: XR FOOT BILATERAL 1 VW  3/18/2019 11:22 AM      History: Evaluate for erosions; Rheumatoid arthritis involving  multiple sites with positive rheumatoid factor (H)    Comparison: 3/29/2017 3/7/2016    Findings: AP view of the feet. Patient is skeletally mature. As seen  on the prior exam there are small periarticular erosions about the  right fifth metatarsal head. Defect along the medial aspect of the  first digit proximal phalanx is unchanged compared to 2016. No joint  space narrowing appreciated and no additional erosive change is  identified. Focal lucency in the distal aspect of the left fifth  metatarsal head is unchanged. No substantial soft tissue swelling.      Impression    Impression:   1. Continued erosive change of the fifth metatarsal head with stable  focal presumed erosion of the first digit proximal phalanx.  2. No new osseous abnormality.    VIPUL ROCA MD   These results are reassuring.          Addendum:  Laboratory Investigations:   Laboratory investigations performed today for which results were available at the time of this note are listed below.  Pending labs will be reported in a separate letter.  Results for orders placed or performed in visit on 03/18/19   CBC with platelets differential   Result Value Ref  Range    WBC 6.6 4.0 - 11.0 10e9/L    RBC Count 4.56 3.8 - 5.2 10e12/L    Hemoglobin 13.4 11.7 - 15.7 g/dL    Hematocrit 41.0 35.0 - 47.0 %    MCV 90 78 - 100 fl    MCH 29.4 26.5 - 33.0 pg    MCHC 32.7 31.5 - 36.5 g/dL    RDW 12.2 10.0 - 15.0 %    Platelet Count 311 150 - 450 10e9/L    Diff Method Automated Method     % Neutrophils 53.9 %    % Lymphocytes 30.9 %    % Monocytes 10.8 %    % Eosinophils 3.0 %    % Basophils 1.2 %    % Immature Granulocytes 0.2 %    Nucleated RBCs 0 0 /100    Absolute Neutrophil 3.5 1.6 - 8.3 10e9/L    Absolute Lymphocytes 2.0 0.8 - 5.3 10e9/L    Absolute Monocytes 0.7 0.0 - 1.3 10e9/L    Absolute Eosinophils 0.2 0.0 - 0.7 10e9/L    Absolute Basophils 0.1 0.0 - 0.2 10e9/L    Abs Immature Granulocytes 0.0 0 - 0.4 10e9/L    Absolute Nucleated RBC 0.0    Erythrocyte sedimentation rate auto   Result Value Ref Range    Sed Rate 28 (H) 0 - 20 mm/h   CRP inflammation   Result Value Ref Range    CRP Inflammation <2.9 0.0 - 8.0 mg/L   Hepatic panel   Result Value Ref Range    Bilirubin Direct 0.1 0.0 - 0.2 mg/dL    Bilirubin Total 0.4 0.2 - 1.3 mg/dL    Albumin 3.9 3.4 - 5.0 g/dL    Protein Total 8.7 6.8 - 8.8 g/dL    Alkaline Phosphatase 65 40 - 150 U/L    ALT 13 0 - 50 U/L    AST 13 0 - 35 U/L   Creatinine   Result Value Ref Range    Creatinine 0.65 0.50 - 1.00 mg/dL    GFR Estimate >90 >60 mL/min/[1.73_m2]    GFR Estimate If Black >90 >60 mL/min/[1.73_m2]      The preliminary results are notable only for a modest increase in inflammation as reflected by the sedimentation rate.        CC  Patient Care Team:  Elieser Sena MD as PCP - General (Pediatrics)  Hardy Reese MD as MD (Pediatric Rheumatology)  Cornelius Leonardo MD as MD (Ophthalmology)  ELIESER SENA    Copy to patient  Kaci Valadez,Delbert  [address] [facility] [address]

## 2019-03-18 NOTE — LETTER
2019    Elieser Sena MD  [facility]  [address]  [place] [address]    Dear Dr. Sena,     I am writing to report final lab results from 3/18/2019 on your patient.     Patient: Geri Valadez  :    2000  MRN:      [id number]    The results include:    Resulted Orders   CBC with platelets differential   Result Value Ref Range    WBC 6.6 4.0 - 11.0 10e9/L    RBC Count 4.56 3.8 - 5.2 10e12/L    Hemoglobin 13.4 11.7 - 15.7 g/dL    Hematocrit 41.0 35.0 - 47.0 %    MCV 90 78 - 100 fl    MCH 29.4 26.5 - 33.0 pg    MCHC 32.7 31.5 - 36.5 g/dL    RDW 12.2 10.0 - 15.0 %    Platelet Count 311 150 - 450 10e9/L    Diff Method Automated Method     % Neutrophils 53.9 %    % Lymphocytes 30.9 %    % Monocytes 10.8 %    % Eosinophils 3.0 %    % Basophils 1.2 %    % Immature Granulocytes 0.2 %    Nucleated RBCs 0 0 /100    Absolute Neutrophil 3.5 1.6 - 8.3 10e9/L    Absolute Lymphocytes 2.0 0.8 - 5.3 10e9/L    Absolute Monocytes 0.7 0.0 - 1.3 10e9/L    Absolute Eosinophils 0.2 0.0 - 0.7 10e9/L    Absolute Basophils 0.1 0.0 - 0.2 10e9/L    Abs Immature Granulocytes 0.0 0 - 0.4 10e9/L    Absolute Nucleated RBC 0.0    Erythrocyte sedimentation rate auto   Result Value Ref Range    Sed Rate 28 (H) 0 - 20 mm/h   CRP inflammation   Result Value Ref Range    CRP Inflammation <2.9 0.0 - 8.0 mg/L   IgG   Result Value Ref Range    IGG 1,770 (H) 695 - 1,620 mg/dL   Cyclic Citrullinated Peptide Antibody IgG   Result Value Ref Range    Cyclic Citrullinated Peptide Antibody, IgG >340 (H) <7 U/mL   Rheumatoid factor   Result Value Ref Range    Rheumatoid Factor 89 (H) <20 IU/mL   Hepatic panel   Result Value Ref Range    Bilirubin Direct 0.1 0.0 - 0.2 mg/dL    Bilirubin Total 0.4 0.2 - 1.3 mg/dL    Albumin 3.9 3.4 - 5.0 g/dL    Protein Total 8.7 6.8 - 8.8 g/dL    Alkaline Phosphatase 65 40 - 150 U/L    ALT 13 0 - 50 U/L    AST 13 0 - 35 U/L   Creatinine   Result Value Ref Range    Creatinine 0.65 0.50  - 1.00 mg/dL    GFR Estimate >90 >60 mL/min/[1.73_m2]      Comment:      Non  GFR Calc  Starting 12/18/2018, serum creatinine based estimated GFR (eGFR) will be   calculated using the Chronic Kidney Disease Epidemiology Collaboration   (CKD-EPI) equation.      GFR Estimate If Black >90 >60 mL/min/[1.73_m2]      Comment:       GFR Calc  Starting 12/18/2018, serum creatinine based estimated GFR (eGFR) will be   calculated using the Chronic Kidney Disease Epidemiology Collaboration   (CKD-EPI) equation.       These results show significant changes compared to baseline on therapy including elevation of the rheumatoid factor, IgG, and rheumatoid factor.  As I wrote earlier, her radiographs were stable, but these labs suggest that her immune system is becoming more activated and there is inflammation brewing.  I think it would be in her best interest to restart her Enbrel and methotrexate, but continued close observation without restarting for now would also be possible (she is currently scheduled to see me again in August).  Please feel free to contact me with any questions or concerns you might have.    Sincerely yours,    Hardy Reese MD       Patient Care Team:  Elieser Sena MD as PCP - General (Pediatrics)  Hardy Reese MD as MD (Pediatric Rheumatology)  Cornelius Leonardo MD as MD (Ophthalmology)        Geri Valadez  [address] [address]

## 2019-03-19 LAB — CCP AB SER IA-ACNC: >340 U/ML

## 2019-03-20 LAB
IGG SERPL-MCNC: 1770 MG/DL (ref 695–1620)
RHEUMATOID FACT SER NEPH-ACNC: 89 IU/ML (ref 0–20)

## 2019-04-15 ENCOUNTER — TELEPHONE (OUTPATIENT)
Dept: RHEUMATOLOGY | Facility: CLINIC | Age: 19
End: 2019-04-15

## 2019-04-15 NOTE — TELEPHONE ENCOUNTER
I left a message for Geri regarding lab letter information per (3/28/2019).  would like her to restart methotrexate and Enbrel due to her increase in inflammation markers per her labs. I asked josé manueland to call back and let us know she received this information. I also spoke to her father regarding this, so he is aware. He wanted us to call Geri also.

## 2019-04-22 NOTE — TELEPHONE ENCOUNTER
Attempted to reach catherine to discuss lab results and plan. Left a voicemail message on her mobile. I requested a call back to discuss.

## 2019-08-16 ENCOUNTER — OFFICE VISIT (OUTPATIENT)
Dept: RHEUMATOLOGY | Facility: CLINIC | Age: 19
End: 2019-08-16
Attending: PEDIATRICS
Payer: COMMERCIAL

## 2019-08-16 VITALS
WEIGHT: 136.02 LBS | HEIGHT: 63 IN | HEART RATE: 76 BPM | BODY MASS INDEX: 24.1 KG/M2 | SYSTOLIC BLOOD PRESSURE: 121 MMHG | DIASTOLIC BLOOD PRESSURE: 70 MMHG | TEMPERATURE: 98.4 F

## 2019-08-16 DIAGNOSIS — M05.79 RHEUMATOID ARTHRITIS INVOLVING MULTIPLE SITES WITH POSITIVE RHEUMATOID FACTOR (H): Primary | Chronic | ICD-10-CM

## 2019-08-16 LAB
BASOPHILS # BLD AUTO: 0 10E9/L (ref 0–0.2)
BASOPHILS NFR BLD AUTO: 0.6 %
CRP SERPL-MCNC: 5.2 MG/L (ref 0–8)
DIFFERENTIAL METHOD BLD: NORMAL
EOSINOPHIL # BLD AUTO: 0.2 10E9/L (ref 0–0.7)
EOSINOPHIL NFR BLD AUTO: 2.9 %
ERYTHROCYTE [DISTWIDTH] IN BLOOD BY AUTOMATED COUNT: 12 % (ref 10–15)
ERYTHROCYTE [SEDIMENTATION RATE] IN BLOOD BY WESTERGREN METHOD: 40 MM/H (ref 0–20)
HCT VFR BLD AUTO: 39.3 % (ref 35–47)
HGB BLD-MCNC: 13.1 G/DL (ref 11.7–15.7)
IGG SERPL-MCNC: 1810 MG/DL (ref 695–1620)
IMM GRANULOCYTES # BLD: 0 10E9/L (ref 0–0.4)
IMM GRANULOCYTES NFR BLD: 0.3 %
LYMPHOCYTES # BLD AUTO: 1.7 10E9/L (ref 0.8–5.3)
LYMPHOCYTES NFR BLD AUTO: 24.2 %
MCH RBC QN AUTO: 28.9 PG (ref 26.5–33)
MCHC RBC AUTO-ENTMCNC: 33.3 G/DL (ref 31.5–36.5)
MCV RBC AUTO: 87 FL (ref 78–100)
MONOCYTES # BLD AUTO: 0.6 10E9/L (ref 0–1.3)
MONOCYTES NFR BLD AUTO: 8.3 %
NEUTROPHILS # BLD AUTO: 4.5 10E9/L (ref 1.6–8.3)
NEUTROPHILS NFR BLD AUTO: 63.7 %
NRBC # BLD AUTO: 0 10*3/UL
NRBC BLD AUTO-RTO: 0 /100
PLATELET # BLD AUTO: 300 10E9/L (ref 150–450)
RBC # BLD AUTO: 4.54 10E12/L (ref 3.8–5.2)
RHEUMATOID FACT SER NEPH-ACNC: 158 IU/ML (ref 0–20)
WBC # BLD AUTO: 7 10E9/L (ref 4–11)

## 2019-08-16 PROCEDURE — 82784 ASSAY IGA/IGD/IGG/IGM EACH: CPT | Performed by: PEDIATRICS

## 2019-08-16 PROCEDURE — 85652 RBC SED RATE AUTOMATED: CPT | Performed by: PEDIATRICS

## 2019-08-16 PROCEDURE — 86431 RHEUMATOID FACTOR QUANT: CPT | Performed by: PEDIATRICS

## 2019-08-16 PROCEDURE — 86140 C-REACTIVE PROTEIN: CPT | Performed by: PEDIATRICS

## 2019-08-16 PROCEDURE — G0463 HOSPITAL OUTPT CLINIC VISIT: HCPCS | Mod: ZF

## 2019-08-16 PROCEDURE — 85025 COMPLETE CBC W/AUTO DIFF WBC: CPT | Performed by: PEDIATRICS

## 2019-08-16 PROCEDURE — 36415 COLL VENOUS BLD VENIPUNCTURE: CPT | Performed by: PEDIATRICS

## 2019-08-16 ASSESSMENT — PAIN SCALES - GENERAL: PAINLEVEL: NO PAIN (0)

## 2019-08-16 ASSESSMENT — MIFFLIN-ST. JEOR: SCORE: 1367.25

## 2019-08-16 NOTE — LETTER
8/16/2019      RE: Geri THOMSON [facility]  [address]  [place] [address]           Rheumatology History:   Date of symptom onset:  2/14/2016  Date of first visit to center:  3/7/2016  Date of GENE diagnosis:  3/7/2016  ILAR category:  polyarticular (RF-positive)  KAYLEEN Status:  . 8/16/2019   KAYLEEN Status Positive     RF Status:  . 8/16/2019   Rheumatoid Factor Status Positive     HLA-B27 Status:  . 8/16/2019   HLA-B27 Status Not tested           Ophthalmology History:   Iritis/Uveitis Comorbidity:  . 8/16/2019   (COIN) Iritis/Uveitis comorbidity? No     Date of last eye exam: 12/17/2018  In compliance with eye screening (y/n):  Yes         Medications:   As of completion of this visit:  Current Outpatient Medications   Medication Sig Dispense Refill     folic acid (FOLVITE) 1 MG tablet Take 1 tablet (1 mg) by mouth daily 30 tablet 11     Multiple Vitamins-Minerals (MULTIVITAMIN PO)        Omega-3 Fatty Acids (OMEGA 3 PO)        Date of last TB Screen:  7/30/2018         Allergies:   No Known Allergies        Problem list:     Patient Active Problem List    Diagnosis Date Noted     Cyclic citrullinated peptide (CCP) antibody positive 03/07/2016     Positive antinuclear antibody 03/07/2016     High total IgG 03/07/2016     Elevated rheumatoid factor 02/24/2016     Rheumatoid arthritis involving multiple sites with positive rheumatoid factor (H) 02/24/2016     Treat to target goal (8/16/2019):  low disease activity.                Subjective:   Geri is a 18 year old female who was seen in Pediatric Rheumatology clinic today for follow up.  Geri was last seen in our clinic on 3/18/2019 and returns today accompanied by her parents.  The encounter diagnosis was Rheumatoid arthritis involving multiple sites with positive rheumatoid factor (H).      Goals for the visit include follow-up.  She has remained off her Enbrel and methotrexate.  Her self-report scores are noted below.  Her only area of symptoms is her right  "middle finger.  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  She is generally otherwise been in excellent health and comprehensive Review of Systems is otherwise negative.         Examination:   Blood pressure 121/70, pulse 76, temperature 98.4  F (36.9  C), temperature source Oral, height 1.61 m (5' 3.39\"), weight 61.7 kg (136 lb 0.4 oz).  63 %ile based on CDC (Girls, 2-20 Years) weight-for-age data based on Weight recorded on 8/16/2019.  Blood pressure percentiles are not available for patients who are 18 years or older.    Geri appears generally well and in good spirits.  Head: Normal head and hair.  Eyes: No scleral injection, pupils normal.  Ears: Normal external structures, tympanic membranes.  Nose: No cartilage deformity, congestion.  Mouth: Normal teeth, gums, tongue, mucosa.  Throat: Normal, without erythema or exudate.  Neck: Normal, without thyromegaly  Nodes: No cervical, supraclavicular, inguinal adenopathy.  Lungs: Normal effort, clear to ausculation.  Heart: Regular rate and rhythm, S1 and S2, no murmurs; normal peripheral pulses and perfusion.  Abdomen: Soft, non-tender, without hepatomegaly, splenomegaly, or masses.  Skin: No inflammatory lesions.  Normal scratches and bruises.  Nails: No pitting, infection.  Neurological: Alert, appropriately interactive, normal cranial nerves, no deficits, normal gait walking.   Musculoskeletal: Question the involvement of several fingers of the left hand, as well as a few toe joints but in the end only noted definite arthritis in one joint as listed below.  That is, there was no clear evidence of current synovitis of the cervical spine, TMJ, sternoclavicular, acromioclavicular, glenohumeral, elbow, wrist, lumbar spine, hip, knee, ankle, or toe joints. No tendonitis or bursitis.    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  We reviewed that her labs last time were starting to show an uptake and inflammation and I am concerned despite her reassuring exam today they were going to see additional progression.  It is important to keep in mind that any damage that occurs is often not reversible, so the best strategy is prevention.  However at this time she still does not seem ready to re-consider starting medicine.    Change Since Last Visit: Somewhat Worse  ACR Functional Class: Normal  (COIN) Provider Global Assessment Of Disease Activity: 1.5  (This is measured on the scale of 0 - 10)  [id number] is 3 consistent with low disease activity (measured on the scale of 0 - 30).  She is at target.  (COIN) On Medication For Treatment Of GENE?: No  (COIN) ESR elevated due to GENE?: Yes  (COIN) CRP elevated due to GENE?: No         Plan:       Orders Placed This Encounter   Procedures     CBC with platelets differential     CRP inflammation     Erythrocyte sedimentation rate auto     IgG     Rheumatoid factor     1. Laboratory monitoring today to assess disease activity.  2. No imaging is needed today.  3. Physical activity as tolerated.  What one can do tells us how well someone is doing, and is healthy for individuals with arthritis.   4. Medications are not being restarted although I did well, having her back on methotrexate and a biologic.  5. Follow-up in 3-4 months.      If there are any new questions or concerns, I would be glad to help and can be reached through our main office at [phone] or our paging  at  [phone].    Hardy Reese MD         Addendum:  Laboratory Investigations:   Laboratory investigations performed today for which results were available at the time of this note are listed below.  Pending labs will be reported in a separate letter.    These preliminary results show increasing inflammation.  Results for orders placed or performed in visit on 08/16/19   CBC with platelets differential   Result Value Ref Range    WBC 7.0 4.0 - 11.0 10e9/L    RBC Count 4.54 3.8 - 5.2 10e12/L    Hemoglobin 13.1 11.7 - 15.7 g/dL    Hematocrit 39.3 35.0 - 47.0 %    MCV 87 78 - 100 fl    MCH 28.9 26.5 - 33.0 pg    MCHC 33.3 31.5 - 36.5 g/dL    RDW 12.0 10.0 - 15.0 %    Platelet Count 300 150 - 450 10e9/L    Diff Method Automated Method     % Neutrophils 63.7 %    % Lymphocytes 24.2 %    % Monocytes 8.3 %    % Eosinophils 2.9 %    % Basophils 0.6 %    % Immature Granulocytes 0.3 %    Nucleated RBCs 0 0 /100    Absolute Neutrophil 4.5 1.6 - 8.3 10e9/L    Absolute Lymphocytes 1.7 0.8 - 5.3 10e9/L    Absolute Monocytes 0.6 0.0 - 1.3 10e9/L    Absolute Eosinophils 0.2 0.0 - 0.7 10e9/L    Absolute Basophils 0.0 0.0 - 0.2 10e9/L    Abs Immature Granulocytes 0.0 0 - 0.4 10e9/L    Absolute Nucleated RBC 0.0    CRP inflammation   Result Value Ref Range    CRP Inflammation 5.2 0.0 - 8.0 mg/L   Erythrocyte sedimentation rate auto   Result Value Ref Range    Sed Rate 40 (H) 0 - 20 mm/h      Hardy Reees MD    CC  Patient Care Team:  No Ref-Primary, Physician as PCP - Cornelius Salinas MD as MD (Ophthalmology)  YOVANI RAMÍREZ    Copy to patient  Rory,Kaci Rory,Delbert  [address] [facility] [address]

## 2019-08-16 NOTE — PATIENT INSTRUCTIONS
[facility] Pediatric Rheumatology    For Help:  The Pediatric Call Center at [phone] can help with scheduling of routine follow up visits.  Antonia Owens and Little Huddleston are the Nurse Coordinators for the Division of Pediatric Rheumatology and can be reached directly at [phone]. They can help with questions about your child s rheumatic condition, medications, and test results.  For emergencies after hours or on the weekends, please call the page  at [phone] and ask to speak to the physician on-call for Pediatric Rheumatology. Please do not use [employer] for urgent requests.  Main  Services:  [phone]  o Hmong/Thai/French: [phone]  o Guyanese: [phone]  o Turkmen: [phone]    For Patient Education Materials:  [email or web address]

## 2019-08-16 NOTE — LETTER
2019    Patient: Geri Valadez  :    2000  MRN:      [id number]          Ms.Amanda ALIX Valadez  [address]  [place] [facility] [address]      Dear ,    I am writing to inform you of your test results from 2019 .    Resulted Orders   CBC with platelets differential   Result Value Ref Range    WBC 7.0 4.0 - 11.0 10e9/L    RBC Count 4.54 3.8 - 5.2 10e12/L    Hemoglobin 13.1 11.7 - 15.7 g/dL    Hematocrit 39.3 35.0 - 47.0 %    MCV 87 78 - 100 fl    MCH 28.9 26.5 - 33.0 pg    MCHC 33.3 31.5 - 36.5 g/dL    RDW 12.0 10.0 - 15.0 %    Platelet Count 300 150 - 450 10e9/L    Diff Method Automated Method     % Neutrophils 63.7 %    % Lymphocytes 24.2 %    % Monocytes 8.3 %    % Eosinophils 2.9 %    % Basophils 0.6 %    % Immature Granulocytes 0.3 %    Nucleated RBCs 0 0 /100    Absolute Neutrophil 4.5 1.6 - 8.3 10e9/L    Absolute Lymphocytes 1.7 0.8 - 5.3 10e9/L    Absolute Monocytes 0.6 0.0 - 1.3 10e9/L    Absolute Eosinophils 0.2 0.0 - 0.7 10e9/L    Absolute Basophils 0.0 0.0 - 0.2 10e9/L    Abs Immature Granulocytes 0.0 0 - 0.4 10e9/L    Absolute Nucleated RBC 0.0    CRP inflammation   Result Value Ref Range    CRP Inflammation 5.2 0.0 - 8.0 mg/L   Erythrocyte sedimentation rate auto   Result Value Ref Range    Sed Rate 40 (H) 0 - 20 mm/h   IgG   Result Value Ref Range    IGG 1,810 (H) 695 - 1,620 mg/dL   Rheumatoid factor   Result Value Ref Range    Rheumatoid Factor 158 (H) <20 IU/mL     These results show growing inflammation and immune activation.  Again, I think it is advisable to start therapy again before there is additional joint damage.  Please feel free to contact me with any questions or concerns you might have.    Sincerely yours,    Hardy Reese MD     CC  Patient Care Team:  No Ref-Primary, Physician as PCP - General  Hardy Reese MD as MD (Pediatric Rheumatology)  Cornelius Leonardo MD as MD (Ophthalmology)

## 2019-08-16 NOTE — NURSING NOTE
"Chief Complaint   Patient presents with     RECHECK     Follow up Rheumatoid arthritis involving multiple sites with positive rheumatoid factor      /70 (BP Location: Right arm, Patient Position: Sitting, Cuff Size: Adult Regular)   Pulse 76   Temp 98.4  F (36.9  C) (Oral)   Ht 5' 3.39\" (161 cm)   Wt 136 lb 0.4 oz (61.7 kg)   BMI 23.80 kg/m    Jodi Arnold LPN    "

## 2019-08-16 NOTE — PROGRESS NOTES
Rheumatology History:   Date of symptom onset:  2/14/2016  Date of first visit to center:  3/7/2016  Date of GENE diagnosis:  3/7/2016  ILAR category:  polyarticular (RF-positive)  KAYLEEN Status:  . 8/16/2019   KAYLEEN Status Positive     RF Status:  . 8/16/2019   Rheumatoid Factor Status Positive     HLA-B27 Status:  . 8/16/2019   HLA-B27 Status Not tested           Ophthalmology History:   Iritis/Uveitis Comorbidity:  . 8/16/2019   (COIN) Iritis/Uveitis comorbidity? No     Date of last eye exam: 12/17/2018  In compliance with eye screening (y/n):  Yes         Medications:   As of completion of this visit:  Current Outpatient Medications   Medication Sig Dispense Refill     folic acid (FOLVITE) 1 MG tablet Take 1 tablet (1 mg) by mouth daily 30 tablet 11     Multiple Vitamins-Minerals (MULTIVITAMIN PO)        Omega-3 Fatty Acids (OMEGA 3 PO)        Date of last TB Screen:  7/30/2018         Allergies:   No Known Allergies        Problem list:     Patient Active Problem List    Diagnosis Date Noted     Cyclic citrullinated peptide (CCP) antibody positive 03/07/2016     Positive antinuclear antibody 03/07/2016     High total IgG 03/07/2016     Elevated rheumatoid factor 02/24/2016     Rheumatoid arthritis involving multiple sites with positive rheumatoid factor (H) 02/24/2016     Treat to target goal (8/16/2019):  low disease activity.                Subjective:   Geri is a 18 year old female who was seen in Pediatric Rheumatology clinic today for follow up.  Geri was last seen in our clinic on 3/18/2019 and returns today accompanied by her parents.  The encounter diagnosis was Rheumatoid arthritis involving multiple sites with positive rheumatoid factor (H).      Goals for the visit include follow-up.  She has remained off her Enbrel and methotrexate.  Her self-report scores are noted below.  Her only area of symptoms is her right middle finger.  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  She is generally otherwise been in excellent health and comprehensive Review of Systems is otherwise negative.         Examination:   Blood pressure 121/70, pulse 76, temperature 98.4  F (36.9  C), temperature source Oral, height 1.61 m (5' 3.39\"), weight 61.7 kg (136 lb 0.4 oz).  63 %ile based on [facility][note 3] (Girls, 2-20 Years) weight-for-age data based on Weight recorded on 8/16/2019.  Blood pressure percentiles are not available for patients who are 18 years or older.    Geri appears generally well and in good spirits.  Head: Normal head and hair.  Eyes: No scleral injection, pupils normal.  Ears: Normal external structures, tympanic membranes.  Nose: No cartilage deformity, congestion.  Mouth: Normal teeth, gums, tongue, mucosa.  Throat: Normal, without erythema or exudate.  Neck: Normal, without thyromegaly  Nodes: No cervical, supraclavicular, inguinal adenopathy.  Lungs: Normal effort, clear to ausculation.  Heart: Regular rate and rhythm, S1 and S2, no murmurs; normal peripheral pulses and perfusion.  Abdomen: Soft, non-tender, without hepatomegaly, splenomegaly, or masses.  Skin: No inflammatory lesions.  Normal scratches and bruises.  Nails: No pitting, infection.  Neurological: Alert, appropriately interactive, normal cranial nerves, no deficits, normal gait walking.   Musculoskeletal: Question the involvement of several fingers of the left hand, as well as a few toe joints but in the end only noted definite arthritis in one joint as listed below.  That is, there was no clear evidence of current synovitis of the cervical spine, TMJ, sternoclavicular, acromioclavicular, glenohumeral, elbow, wrist, lumbar spine, hip, knee, ankle, or toe joints. No tendonitis or bursitis.    Axial Skeleton  (COIN) Sacroiliac tenderness:: No  (COIN) Positive ASHLEY test:: No  (COIN) Modified Schober's Test:: No  Upper " Extremity  Index MCP: R Swollen  Entheses  (COIN) Tender Entheses count: 0    Total active joints:  1  Total limited joints:  0  Tender entheses count:  0           Assessment:   Rheumatoid arthritis (rheumatoid factor and CCP antibody positive polyarticular juvenile idiopathic arthritis) with a history of erosive changes.  We reviewed that her labs last time were starting to show an uptake and inflammation and I am concerned despite her reassuring exam today they were going to see additional progression.  It is important to keep in mind that any damage that occurs is often not reversible, so the best strategy is prevention.  However at this time she still does not seem ready to re-consider starting medicine.    Change Since Last Visit: Somewhat Worse  ACR Functional Class: Normal  (COIN) Provider Global Assessment Of Disease Activity: 1.5  (This is measured on the scale of 0 - 10)  [id number] is 3 consistent with low disease activity (measured on the scale of 0 - 30).  She is at target.  (COIN) On Medication For Treatment Of GENE?: No  (COIN) ESR elevated due to GENE?: Yes  (COIN) CRP elevated due to GENE?: No         Plan:       Orders Placed This Encounter   Procedures     CBC with platelets differential     CRP inflammation     Erythrocyte sedimentation rate auto     IgG     Rheumatoid factor     1. Laboratory monitoring today to assess disease activity.  2. No imaging is needed today.  3. Physical activity as tolerated.  What one can do tells us how well someone is doing, and is healthy for individuals with arthritis.   4. Medications are not being restarted although I did well, having her back on methotrexate and a biologic.  5. Follow-up in 3-4 months.      If there are any new questions or concerns, I would be glad to help and can be reached through our main office at [phone] or our paging  at [phone].    Hardy Reese MD         Addendum:  Laboratory Investigations:   Laboratory  investigations performed today for which results were available at the time of this note are listed below.  Pending labs will be reported in a separate letter.    These preliminary results show increasing inflammation.  Results for orders placed or performed in visit on 08/16/19   CBC with platelets differential   Result Value Ref Range    WBC 7.0 4.0 - 11.0 10e9/L    RBC Count 4.54 3.8 - 5.2 10e12/L    Hemoglobin 13.1 11.7 - 15.7 g/dL    Hematocrit 39.3 35.0 - 47.0 %    MCV 87 78 - 100 fl    MCH 28.9 26.5 - 33.0 pg    MCHC 33.3 31.5 - 36.5 g/dL    RDW 12.0 10.0 - 15.0 %    Platelet Count 300 150 - 450 10e9/L    Diff Method Automated Method     % Neutrophils 63.7 %    % Lymphocytes 24.2 %    % Monocytes 8.3 %    % Eosinophils 2.9 %    % Basophils 0.6 %    % Immature Granulocytes 0.3 %    Nucleated RBCs 0 0 /100    Absolute Neutrophil 4.5 1.6 - 8.3 10e9/L    Absolute Lymphocytes 1.7 0.8 - 5.3 10e9/L    Absolute Monocytes 0.6 0.0 - 1.3 10e9/L    Absolute Eosinophils 0.2 0.0 - 0.7 10e9/L    Absolute Basophils 0.0 0.0 - 0.2 10e9/L    Abs Immature Granulocytes 0.0 0 - 0.4 10e9/L    Absolute Nucleated RBC 0.0    CRP inflammation   Result Value Ref Range    CRP Inflammation 5.2 0.0 - 8.0 mg/L   Erythrocyte sedimentation rate auto   Result Value Ref Range    Sed Rate 40 (H) 0 - 20 mm/h              CC  Patient Care Team:  No Ref-Primary, Physician as PCP - General  Hardy Reese MD as MD (Pediatric Rheumatology)  Cornelius Leonardo MD as MD (Ophthalmology)  YOVANI RAMÍREZ    Copy to patient  RoryKaci RoryDelbert  [address] [facility] [place] [address]

## 2019-11-03 ENCOUNTER — HEALTH MAINTENANCE LETTER (OUTPATIENT)
Age: 19
End: 2019-11-03

## 2019-12-31 ENCOUNTER — OFFICE VISIT (OUTPATIENT)
Dept: RHEUMATOLOGY | Facility: CLINIC | Age: 19
End: 2019-12-31
Attending: PEDIATRICS
Payer: COMMERCIAL

## 2019-12-31 VITALS
SYSTOLIC BLOOD PRESSURE: 111 MMHG | HEIGHT: 63 IN | DIASTOLIC BLOOD PRESSURE: 82 MMHG | WEIGHT: 130.73 LBS | BODY MASS INDEX: 23.16 KG/M2 | TEMPERATURE: 98 F | HEART RATE: 79 BPM

## 2019-12-31 DIAGNOSIS — R76.8 HIGH TOTAL IGG: ICD-10-CM

## 2019-12-31 DIAGNOSIS — R76.8 ELEVATED RHEUMATOID FACTOR: ICD-10-CM

## 2019-12-31 DIAGNOSIS — R76.8 POSITIVE ANTINUCLEAR ANTIBODY: ICD-10-CM

## 2019-12-31 DIAGNOSIS — M05.79 RHEUMATOID ARTHRITIS INVOLVING MULTIPLE SITES WITH POSITIVE RHEUMATOID FACTOR (H): Primary | Chronic | ICD-10-CM

## 2019-12-31 DIAGNOSIS — R76.8 CYCLIC CITRULLINATED PEPTIDE (CCP) ANTIBODY POSITIVE: ICD-10-CM

## 2019-12-31 LAB
BASOPHILS # BLD AUTO: 0.1 10E9/L (ref 0–0.2)
BASOPHILS NFR BLD AUTO: 0.7 %
CRP SERPL-MCNC: <2.9 MG/L (ref 0–8)
DIFFERENTIAL METHOD BLD: NORMAL
EOSINOPHIL # BLD AUTO: 0.2 10E9/L (ref 0–0.7)
EOSINOPHIL NFR BLD AUTO: 2.2 %
ERYTHROCYTE [DISTWIDTH] IN BLOOD BY AUTOMATED COUNT: 12.4 % (ref 10–15)
ERYTHROCYTE [SEDIMENTATION RATE] IN BLOOD BY WESTERGREN METHOD: 48 MM/H (ref 0–20)
HCT VFR BLD AUTO: 40.7 % (ref 35–47)
HGB BLD-MCNC: 13.1 G/DL (ref 11.7–15.7)
IMM GRANULOCYTES # BLD: 0 10E9/L (ref 0–0.4)
IMM GRANULOCYTES NFR BLD: 0.3 %
LYMPHOCYTES # BLD AUTO: 1.6 10E9/L (ref 0.8–5.3)
LYMPHOCYTES NFR BLD AUTO: 22.8 %
MCH RBC QN AUTO: 29.3 PG (ref 26.5–33)
MCHC RBC AUTO-ENTMCNC: 32.2 G/DL (ref 31.5–36.5)
MCV RBC AUTO: 91 FL (ref 78–100)
MONOCYTES # BLD AUTO: 0.5 10E9/L (ref 0–1.3)
MONOCYTES NFR BLD AUTO: 7 %
NEUTROPHILS # BLD AUTO: 4.6 10E9/L (ref 1.6–8.3)
NEUTROPHILS NFR BLD AUTO: 67 %
NRBC # BLD AUTO: 0 10*3/UL
NRBC BLD AUTO-RTO: 0 /100
PLATELET # BLD AUTO: 285 10E9/L (ref 150–450)
RBC # BLD AUTO: 4.47 10E12/L (ref 3.8–5.2)
WBC # BLD AUTO: 6.8 10E9/L (ref 4–11)

## 2019-12-31 PROCEDURE — G0463 HOSPITAL OUTPT CLINIC VISIT: HCPCS | Mod: ZF

## 2019-12-31 PROCEDURE — 36415 COLL VENOUS BLD VENIPUNCTURE: CPT | Performed by: PEDIATRICS

## 2019-12-31 PROCEDURE — 86039 ANTINUCLEAR ANTIBODIES (ANA): CPT | Performed by: PEDIATRICS

## 2019-12-31 PROCEDURE — 86235 NUCLEAR ANTIGEN ANTIBODY: CPT | Performed by: PEDIATRICS

## 2019-12-31 PROCEDURE — 82784 ASSAY IGA/IGD/IGG/IGM EACH: CPT | Performed by: PEDIATRICS

## 2019-12-31 PROCEDURE — 86225 DNA ANTIBODY NATIVE: CPT | Performed by: PEDIATRICS

## 2019-12-31 PROCEDURE — 86431 RHEUMATOID FACTOR QUANT: CPT | Performed by: PEDIATRICS

## 2019-12-31 PROCEDURE — 86038 ANTINUCLEAR ANTIBODIES: CPT | Performed by: PEDIATRICS

## 2019-12-31 PROCEDURE — 86200 CCP ANTIBODY: CPT | Performed by: PEDIATRICS

## 2019-12-31 PROCEDURE — 85652 RBC SED RATE AUTOMATED: CPT | Performed by: PEDIATRICS

## 2019-12-31 PROCEDURE — 85025 COMPLETE CBC W/AUTO DIFF WBC: CPT | Performed by: PEDIATRICS

## 2019-12-31 PROCEDURE — 86140 C-REACTIVE PROTEIN: CPT | Performed by: PEDIATRICS

## 2019-12-31 ASSESSMENT — MIFFLIN-ST. JEOR: SCORE: 1336.38

## 2019-12-31 ASSESSMENT — PAIN SCALES - GENERAL: PAINLEVEL: NO PAIN (0)

## 2019-12-31 NOTE — NURSING NOTE
"Chief Complaint   Patient presents with     Arthritis     Rheumatoid arthritis       /82 (BP Location: Right arm, Patient Position: Sitting, Cuff Size: Adult Regular)   Pulse 79   Temp 98  F (36.7  C) (Oral)   Ht 5' 3.27\" (160.7 cm)   Wt 130 lb 11.7 oz (59.3 kg)   BMI 22.96 kg/m      Tati Guillermo CMA  December 31, 2019  "

## 2019-12-31 NOTE — LETTER
12/31/2019      RE: Geri THOMSON [facility]  [address] [place]  [place] [address]           Rheumatology History:   Date of symptom onset:  2/14/2016  Date of first visit to center:  3/7/2016  Date of GENE diagnosis:  3/7/2016  ILAR category:  polyarticular (RF-positive)  KAYLEEN Status:  . 12/31/2019   KAYLEEN Status Positive     RF Status:  . 12/31/2019   Rheumatoid Factor Status Positive     HLA-B27 Status:  . 12/31/2019   HLA-B27 Status Not tested           Ophthalmology History:   Iritis/Uveitis Comorbidity:  . 12/31/2019   (COIN) Iritis/Uveitis comorbidity? No     Date of last eye exam: 12/17/2018  In compliance with eye screening (y/n):  Yes         Medications:   As of completion of this visit:  Current Outpatient Medications   Medication Sig Dispense Refill     folic acid (FOLVITE) 1 MG tablet Take 1 tablet (1 mg) by mouth daily 30 tablet 11     Multiple Vitamins-Minerals (MULTIVITAMIN PO)        Omega-3 Fatty Acids (OMEGA 3 PO)        Date of last TB Screen:  7/30/2018         Allergies:   No Known Allergies        Problem list:     Patient Active Problem List    Diagnosis Date Noted     Cyclic citrullinated peptide (CCP) antibody positive 03/07/2016     Positive antinuclear antibody 03/07/2016     High total IgG 03/07/2016     Elevated rheumatoid factor 02/24/2016     Rheumatoid arthritis involving multiple sites with positive rheumatoid factor (H) 02/24/2016     Treat to target goal (8/16/2019):  low disease activity.                Subjective:   Geri is a 19 year old female follow-up who was seen in Pediatric Rheumatology clinic today for follow up.  Geri was last seen in our clinic on 8/16/2019 and returns today accompanied by her parents.  The primary encounter diagnosis was Rheumatoid arthritis involving multiple sites with positive rheumatoid factor (H). Diagnoses of Elevated rheumatoid factor, Cyclic citrullinated peptide (CCP) antibody positive, High total IgG, and Positive antinuclear antibody were also  "pertinent to this visit.  Goals for the visit include follow-up.      As noted below in her self-report scores things have been going fine.  She is completed another semester in school and this is gone well.  She particularly enjoyed her finance class.  She has not had any musculoskeletal concerns whatsoever.  She is otherwise been in excellent health and she completed our comprehensive review of systems form with no new concerns noted.  I asked about little bit of rash she has on her nose and it is thought this might be related to acne.    Information per our standardized questionnaire is as below:   Self Report  (COIN) Patient Pain Status: 0  (COIN) Patient Global Assessment Of Disease Activity: 0  Score Reported By: Self  (COIN) Patient Highest Level Of Education: some college or technical school  Arthritis History  (COIN) Morning stiffness in the past week: no stiffness  Has your arthritis stopped from trying any athletic or rigorous activities, or interfaced with your ability to do these activities: No  Have you been limited your ability to do normal daily activities in the past week: No  Did you needed help from other people to do normal activities in the past week: No  Have you used any aids or devices to help you do normal daily activities in the past week: No  Important Medical Events  (COIN) Patient has experienced drug-related serious adverse events since last encounter?: No         Examination:   Blood pressure 111/82, pulse 79, temperature 98  F (36.7  C), temperature source Oral, height 1.607 m (5' 3.27\"), weight 59.3 kg (130 lb 11.7 oz).  Normalized weight-for-age data not available for patients older than 20 years.  Blood pressure percentiles are not available for patients who are 18 years or older.    Geri appears generally well and in good spirits.  Head: Normal head and hair.  Eyes: No scleral injection, pupils normal.  Ears: Normal external structures, tympanic membranes.  Nose: No cartilage " deformity, congestion.  Mouth: Normal teeth, gums, tongue, mucosa.  Throat: Normal, without erythema or exudate.  Neck: Normal, without thyromegaly  Nodes: No cervical, supraclavicular, axillary, inguinal adenopathy.  Lungs: Normal effort, clear to ausculation.  Heart: Regular rate and rhythm, S1 and S2, no murmurs; normal peripheral pulses and perfusion.  Abdomen: Soft, non-tender, without hepatomegaly, splenomegaly, or masses.  Skin: Mild facial acne.  There is some mild thickening and hyperpigmentation of the skin over the bridge of the nose.  It is not clear that there is a malar rash but there is some change on the cheeks it may relate to acne.  Normal scratches and bruises.  Nails: No pitting, infection.  Neurological: Alert, appropriately interactive, normal cranial nerves, no deficits, normal gait walking.   Musculoskeletal: No evidence of current synovitis of the cervical spine, TMJ, sternoclavicular, acromioclavicular, glenohumeral, elbow, wrist, finger, lumbar spine, hip, knee, ankle, or toe joints. No tendonitis or bursitis.    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  She is doing really quite well at this time which is a surprise given that her labs were definitely trending in the wrong direction last time I saw her.  With a little bit of change to the skin of the face who would also keep in mind the possibility of MCTD or even SLE  when doing any follow-up.  We debated whether to do any follow-up today as we did not take any action based on the testing last time.  Another alternative besides follow-up laboratory studies would be to do follow-up imaging.  She last had radiographs just 9 months ago so it is too early to do plain radiographs.  I mentioned that ultrasound and MRI can be much more informative than radiographs but are not particularly practical techniques in this situation due to the amount of time it would take to survey all these joints.    Change Since Last Visit: Same  ACR Functional Class: Normal  (COIN) Provider Global Assessment Of Disease Activity: 1  (This is measured on the scale of 0 - 10)  [id number] is 1   (This is measured on the scale of 0 - 30).  (COIN) On Medication For Treatment Of GENE?: No  (COIN) ESR elevated due to GENE?: Yes  (COIN) CRP elevated due to GENE?: No  Health counseling reviewed:          Plan:     Orders Placed This Encounter   Procedures     Rheumatoid factor     CBC with platelets differential     CRP inflammation     Cyclic Citrullinated Peptide Antibody IgG     Erythrocyte sedimentation rate auto     IgG     Anti Nuclear Mojgan IgG by IFA with Reflex     EZ antibody panel     DNA double stranded antibodies     1. Laboratory tests today to monitor disease activity.  2. No imaging is needed today.  See above comments.  3. Continue eye examinations for uveitis monitoring every 12 months.  She is due.  4. Physical activity as tolerated.  What one can do tells us how well someone is doing, and is healthy for individuals with arthritis.   5. Medications: Will not be restarted at this time..  6. Follow-up in 6 months.    If there are any new questions or concerns, I would be glad to help and can be reached through our main office at [phone] or our paging  at [phone].    Hardy Reese MD    This note was dictated and might contain unintended typographical errors missed in proofreading.   If there are questions/concerns, please contact the author.         Addendum:  Laboratory Investigations:   Laboratory investigations performed today for which results were available at the time of this note are listed below.  Pending labs will be reported in a separate letter.    The preliminary results again indicate inflammationn.  Results for orders placed or performed in visit on 12/31/19   CBC with platelets differential     Status: None   Result Value Ref Range    WBC 6.8 4.0 - 11.0 10e9/L    RBC Count 4.47 3.8 - 5.2 10e12/L    Hemoglobin 13.1 11.7 - 15.7 g/dL    Hematocrit 40.7 35.0 - 47.0 %    MCV 91 78 - 100 fl    MCH 29.3 26.5 - 33.0 pg    MCHC 32.2 31.5 - 36.5 g/dL    RDW 12.4 10.0 - 15.0 %    Platelet Count 285 150 - 450 10e9/L    Diff Method Automated Method     % Neutrophils 67.0 %    % Lymphocytes 22.8 %    % Monocytes 7.0 %    % Eosinophils 2.2 %    % Basophils 0.7 %    % Immature Granulocytes 0.3 %    Nucleated RBCs 0 0 /100    Absolute Neutrophil 4.6 1.6 - 8.3 10e9/L    Absolute Lymphocytes 1.6 0.8 - 5.3 10e9/L    Absolute Monocytes 0.5 0.0 - 1.3 10e9/L    Absolute Eosinophils 0.2 0.0 - 0.7 10e9/L    Absolute Basophils 0.1 0.0 - 0.2 10e9/L    Abs Immature Granulocytes 0.0 0 - 0.4 10e9/L    Absolute Nucleated RBC 0.0    CRP inflammation     Status: None   Result Value Ref Range    CRP Inflammation <2.9 0.0 - 8.0 mg/L   Erythrocyte sedimentation rate auto     Status: Abnormal   Result Value Ref Range    Sed Rate 48 (H) 0 - 20 mm/h      Hardy Reese MD    CC  Patient Care Team:  Elieser Sena MD as PCP - General (Pediatrics)  Cornelius Leonardo MD as MD (Ophthalmology)    Copy to patient  Kaci Valadez,Delbert  [address] [facility] [address]

## 2019-12-31 NOTE — PROGRESS NOTES
Rheumatology History:   Date of symptom onset:  2/14/2016  Date of first visit to center:  3/7/2016  Date of GENE diagnosis:  3/7/2016  ILAR category:  polyarticular (RF-positive)  KAYLEEN Status:  . 12/31/2019   KAYLEEN Status Positive     RF Status:  . 12/31/2019   Rheumatoid Factor Status Positive     HLA-B27 Status:  . 12/31/2019   HLA-B27 Status Not tested           Ophthalmology History:   Iritis/Uveitis Comorbidity:  . 12/31/2019   (COIN) Iritis/Uveitis comorbidity? No     Date of last eye exam: 12/17/2018  In compliance with eye screening (y/n):  Yes         Medications:   As of completion of this visit:  Current Outpatient Medications   Medication Sig Dispense Refill     folic acid (FOLVITE) 1 MG tablet Take 1 tablet (1 mg) by mouth daily 30 tablet 11     Multiple Vitamins-Minerals (MULTIVITAMIN PO)        Omega-3 Fatty Acids (OMEGA 3 PO)        Date of last TB Screen:  7/30/2018         Allergies:   No Known Allergies        Problem list:     Patient Active Problem List    Diagnosis Date Noted     Cyclic citrullinated peptide (CCP) antibody positive 03/07/2016     Positive antinuclear antibody 03/07/2016     High total IgG 03/07/2016     Elevated rheumatoid factor 02/24/2016     Rheumatoid arthritis involving multiple sites with positive rheumatoid factor (H) 02/24/2016     Treat to target goal (8/16/2019):  low disease activity.                Subjective:   Geri is a 19 year old female follow-up who was seen in Pediatric Rheumatology clinic today for follow up.  Geri was last seen in our clinic on 8/16/2019 and returns today accompanied by her parents.  The primary encounter diagnosis was Rheumatoid arthritis involving multiple sites with positive rheumatoid factor (H). Diagnoses of Elevated rheumatoid factor, Cyclic citrullinated peptide (CCP) antibody positive, High total IgG, and Positive antinuclear antibody were also pertinent to this visit.  Goals for the visit include follow-up.      As noted below  "in her self-report scores things have been going fine.  She is completed another semester in school and this is gone well.  She particularly enjoyed her finance class.  She has not had any musculoskeletal concerns whatsoever.  She is otherwise been in excellent health and she completed our comprehensive review of systems form with no new concerns noted.  I asked about little bit of rash she has on her nose and it is thought this might be related to acne.    Information per our standardized questionnaire is as below:   Self Report  (COIN) Patient Pain Status: 0  (COIN) Patient Global Assessment Of Disease Activity: 0  Score Reported By: Self  (COIN) Patient Highest Level Of Education: some college or technical school  Arthritis History  (COIN) Morning stiffness in the past week: no stiffness  Has your arthritis stopped from trying any athletic or rigorous activities, or interfaced with your ability to do these activities: No  Have you been limited your ability to do normal daily activities in the past week: No  Did you needed help from other people to do normal activities in the past week: No  Have you used any aids or devices to help you do normal daily activities in the past week: No  Important Medical Events  (COIN) Patient has experienced drug-related serious adverse events since last encounter?: No         Examination:   Blood pressure 111/82, pulse 79, temperature 98  F (36.7  C), temperature source Oral, height 1.607 m (5' 3.27\"), weight 59.3 kg (130 lb 11.7 oz).  Normalized weight-for-age data not available for patients older than 20 years.  Blood pressure percentiles are not available for patients who are 18 years or older.    Geri appears generally well and in good spirits.  Head: Normal head and hair.  Eyes: No scleral injection, pupils normal.  Ears: Normal external structures, tympanic membranes.  Nose: No cartilage deformity, congestion.  Mouth: Normal teeth, gums, tongue, mucosa.  Throat: Normal, " without erythema or exudate.  Neck: Normal, without thyromegaly  Nodes: No cervical, supraclavicular, axillary, inguinal adenopathy.  Lungs: Normal effort, clear to ausculation.  Heart: Regular rate and rhythm, S1 and S2, no murmurs; normal peripheral pulses and perfusion.  Abdomen: Soft, non-tender, without hepatomegaly, splenomegaly, or masses.  Skin: Mild facial acne.  There is some mild thickening and hyperpigmentation of the skin over the bridge of the nose.  It is not clear that there is a malar rash but there is some change on the cheeks it may relate to acne.  Normal scratches and bruises.  Nails: No pitting, infection.  Neurological: Alert, appropriately interactive, normal cranial nerves, no deficits, normal gait walking.   Musculoskeletal: No evidence of current synovitis of the cervical spine, TMJ, sternoclavicular, acromioclavicular, glenohumeral, elbow, wrist, finger, lumbar spine, hip, knee, ankle, or toe joints. No tendonitis or bursitis.    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  She is doing really quite well at this time which is a surprise given that her labs were definitely trending in the wrong direction last time I saw her.  With a little bit of change to the skin of the face who would also keep in mind the possibility of MCTD or even SLE when doing any follow-up.  We debated whether to do any follow-up today as we did  not take any action based on the testing last time.  Another alternative besides follow-up laboratory studies would be to do follow-up imaging.  She last had radiographs just 9 months ago so it is too early to do plain radiographs.  I mentioned that ultrasound and MRI can be much more informative than radiographs but are not particularly practical techniques in this situation due to the amount of time it would take to survey all these joints.    Change Since Last Visit: Same  ACR Functional Class: Normal  (COIN) Provider Global Assessment Of Disease Activity: 1  (This is measured on the scale of 0 - 10)  [id number] is 1   (This is measured on the scale of 0 - 30).  (COIN) On Medication For Treatment Of GENE?: No  (COIN) ESR elevated due to GENE?: Yes  (COIN) CRP elevated due to GENE?: No  Health counseling reviewed:          Plan:     Orders Placed This Encounter   Procedures     Rheumatoid factor     CBC with platelets differential     CRP inflammation     Cyclic Citrullinated Peptide Antibody IgG     Erythrocyte sedimentation rate auto     IgG     Anti Nuclear Mojgan IgG by IFA with Reflex     EZ antibody panel     DNA double stranded antibodies     1. Laboratory tests today to monitor disease activity.  2. No imaging is needed today.  See above comments.  3. Continue eye examinations for uveitis monitoring every 12 months.  She is due.  4. Physical activity as tolerated.  What one can do tells us how well someone is doing, and is healthy for individuals with arthritis.   5. Medications: Will not be restarted at this time..  6. Follow-up in 6 months.    If there are any new questions or concerns, I would be glad to help and can be reached through our main office at [phone] or our paging  at [phone].    Hardy Reese MD    This note was dictated and might contain unintended typographical errors missed in proofreading.  If there are questions/concerns, please contact the author.         Addendum:   Laboratory Investigations:   Laboratory investigations performed today for which results were available at the time of this note are listed below.  Pending labs will be reported in a separate letter.    The preliminary results again indicate inflammationn.  Results for orders placed or performed in visit on 12/31/19   CBC with platelets differential     Status: None   Result Value Ref Range    WBC 6.8 4.0 - 11.0 10e9/L    RBC Count 4.47 3.8 - 5.2 10e12/L    Hemoglobin 13.1 11.7 - 15.7 g/dL    Hematocrit 40.7 35.0 - 47.0 %    MCV 91 78 - 100 fl    MCH 29.3 26.5 - 33.0 pg    MCHC 32.2 31.5 - 36.5 g/dL    RDW 12.4 10.0 - 15.0 %    Platelet Count 285 150 - 450 10e9/L    Diff Method Automated Method     % Neutrophils 67.0 %    % Lymphocytes 22.8 %    % Monocytes 7.0 %    % Eosinophils 2.2 %    % Basophils 0.7 %    % Immature Granulocytes 0.3 %    Nucleated RBCs 0 0 /100    Absolute Neutrophil 4.6 1.6 - 8.3 10e9/L    Absolute Lymphocytes 1.6 0.8 - 5.3 10e9/L    Absolute Monocytes 0.5 0.0 - 1.3 10e9/L    Absolute Eosinophils 0.2 0.0 - 0.7 10e9/L    Absolute Basophils 0.1 0.0 - 0.2 10e9/L    Abs Immature Granulocytes 0.0 0 - 0.4 10e9/L    Absolute Nucleated RBC 0.0    CRP inflammation     Status: None   Result Value Ref Range    CRP Inflammation <2.9 0.0 - 8.0 mg/L   Erythrocyte sedimentation rate auto     Status: Abnormal   Result Value Ref Range    Sed Rate 48 (H) 0 - 20 mm/h      CC  Patient Care Team:  Elieser Sena MD as PCP - General (Pediatrics)  Hardy Reese MD as MD (Pediatric Rheumatology)  Cornelius Leonardo MD as MD (Ophthalmology)  ELIESER SENA    Copy to patient  Kaci Valadez Michael  [address] [facility] [address]

## 2019-12-31 NOTE — LETTER
2020    Elieser Sena MD  [facility] PEDIATRICS  [facility] [address]  [place] [address]    Dear Dr. Sena,     I am writing to report final lab results from 2019 on your patient.     Patient: Geri Valadez  :    2000  MRN:      [id number]    The results include:    Resulted Orders   Rheumatoid factor   Result Value Ref Range    Rheumatoid Factor 180 (H) <20. Assayed by alternative method. IU/mL   CBC with platelets differential   Result Value Ref Range    WBC 6.8 4.0 - 11.0 10e9/L    RBC Count 4.47 3.8 - 5.2 10e12/L    Hemoglobin 13.1 11.7 - 15.7 g/dL    Hematocrit 40.7 35.0 - 47.0 %    MCV 91 78 - 100 fl    MCH 29.3 26.5 - 33.0 pg    MCHC 32.2 31.5 - 36.5 g/dL    RDW 12.4 10.0 - 15.0 %    Platelet Count 285 150 - 450 10e9/L    Diff Method Automated Method     % Neutrophils 67.0 %    % Lymphocytes 22.8 %    % Monocytes 7.0 %    % Eosinophils 2.2 %    % Basophils 0.7 %    % Immature Granulocytes 0.3 %    Nucleated RBCs 0 0 /100    Absolute Neutrophil 4.6 1.6 - 8.3 10e9/L    Absolute Lymphocytes 1.6 0.8 - 5.3 10e9/L    Absolute Monocytes 0.5 0.0 - 1.3 10e9/L    Absolute Eosinophils 0.2 0.0 - 0.7 10e9/L    Absolute Basophils 0.1 0.0 - 0.2 10e9/L    Abs Immature Granulocytes 0.0 0 - 0.4 10e9/L    Absolute Nucleated RBC 0.0    CRP inflammation   Result Value Ref Range    CRP Inflammation <2.9 0.0 - 8.0 mg/L   Cyclic Citrullinated Peptide Antibody IgG   Result Value Ref Range    Cyclic Citrullinated Peptide Antibody, IgG >340 (H) <7 U/mL   Erythrocyte sedimentation rate auto   Result Value Ref Range    Sed Rate 48 (H) 0 - 20 mm/h   IgG   Result Value Ref Range    IGG 1,792 (H) 610 - 1,616 mg/dL   Anti Nuclear Mojgan IgG by IFA with Reflex   Result Value Ref Range    KAYLEEN interpretation Borderline Positive (A) NEG^Negative      Comment:                                         Reference range:  <1:40  NEGATIVE  1:40 - 1:80  BORDERLINE POSITIVE  >1:80 POSITIVE      KAYLEEN pattern 1 SPECKLED      KAYLEEN titer 1 1:40    EZ antibody panel   Result Value Ref Range    RNP Antibody IgG 0.6 0.0 - 0.9 AI      Comment:      Negative  Antibody index (AI) values reflect qualitative changes in antibody   concentration that cannot be directly associated with clinical condition or   disease state.      Walsh EZ Antibody IgG <0.2 0.0 - 0.9 AI      Comment:      Negative  Antibody index (AI) values reflect qualitative changes in antibody   concentration that cannot be directly associated with clinical condition or   disease state.      SSA (Ro) (EZ) Antibody, IgG <0.2 0.0 - 0.9 AI      Comment:      Negative  Antibody index (AI) values reflect qualitative changes in antibody   concentration that cannot be directly associated with clinical condition or   disease state.      SSB (La) (EZ) Antibody, IgG <0.2 0.0 - 0.9 AI      Comment:      Negative  Antibody index (AI) values reflect qualitative changes in antibody   concentration that cannot be directly associated with clinical condition or   disease state.     DNA double stranded antibodies   Result Value Ref Range    DNA-ds 5 <10 IU/mL      Comment:      Negative     Her sed rate is consistent with active inflammation.  She is always had a high CCP antibody and the rheumatoid factor which had become lower on treatment is beginning to climb.  The same trend is seen for her total serum IgG.  She has a weakly positive KAYLEEN test with no specific antinuclear antibodies so I do not think any of the rash that has appeared on her face relates to an underlying condition such as lupus or MCTD (which can mimic RA).  The blood counts are reassuring.      It is still concerning to me that she is not on treatment, and that she may accrue damage as result.  If symptoms do develop I hope she will seek follow-up quickly.Please feel free to contact me with any questions or concerns you might have.    Sincerely yours,    Hardy Reese MD     CC  Patient Care Team:  Elieser Sena,  MD as PCP - General (Pediatrics)  Hardy Reese MD as MD (Pediatric Rheumatology)  Cornelius Leonardo MD as MD (Ophthalmology)      Geri THOMSON [facility]  [address]  [facility] [address]

## 2019-12-31 NOTE — LETTER
12/31/2019      RE: Geri THOMSON [facility]  [address]  [place] [address]           Rheumatology History:   Date of symptom onset:  2/14/2016  Date of first visit to center:  3/7/2016  Date of GENE diagnosis:  3/7/2016  ILAR category:  polyarticular (RF-positive)  KAYLEEN Status:  . 12/31/2019   KAYLEEN Status Positive     RF Status:  . 12/31/2019   Rheumatoid Factor Status Positive     HLA-B27 Status:  . 12/31/2019   HLA-B27 Status Not tested           Ophthalmology History:   Iritis/Uveitis Comorbidity:  . 12/31/2019   (COIN) Iritis/Uveitis comorbidity? No     Date of last eye exam: 12/17/2018  In compliance with eye screening (y/n):  Yes         Medications:   As of completion of this visit:  Current Outpatient Medications   Medication Sig Dispense Refill     folic acid (FOLVITE) 1 MG tablet Take 1 tablet (1 mg) by mouth daily 30 tablet 11     Multiple Vitamins-Minerals (MULTIVITAMIN PO)        Omega-3 Fatty Acids (OMEGA 3 PO)        Date of last TB Screen:  7/30/2018         Allergies:   No Known Allergies        Problem list:     Patient Active Problem List    Diagnosis Date Noted     Cyclic citrullinated peptide (CCP) antibody positive 03/07/2016     Positive antinuclear antibody 03/07/2016     High total IgG 03/07/2016     Elevated rheumatoid factor 02/24/2016     Rheumatoid arthritis involving multiple sites with positive rheumatoid factor (H) 02/24/2016     Treat to target goal (8/16/2019):  low disease activity.                Subjective:   Geri is a 19 year old female follow-up who was seen in Pediatric Rheumatology clinic today for follow up.  Geri was last seen in our clinic on 8/16/2019 and returns today accompanied by her parents.  The primary encounter diagnosis was Rheumatoid arthritis involving multiple sites with positive rheumatoid factor (H). Diagnoses of Elevated rheumatoid factor, Cyclic citrullinated peptide (CCP) antibody positive, High total IgG, and Positive antinuclear antibody were also  "pertinent to this visit.  Goals for the visit include follow-up.      As noted below in her self-report scores things have been going fine.  She is completed another semester in school and this is gone well.  She particularly enjoyed her finance class.  She has not had any musculoskeletal concerns whatsoever.  She is otherwise been in excellent health and she completed our comprehensive review of systems form with no new concerns noted.  I asked about little bit of rash she has on her nose and it is thought this might be related to acne.    Information per our standardized questionnaire is as below:   Self Report  (COIN) Patient Pain Status: 0  (COIN) Patient Global Assessment Of Disease Activity: 0  Score Reported By: Self  (COIN) Patient Highest Level Of Education: some college or technical school  Arthritis History  (COIN) Morning stiffness in the past week: no stiffness  Has your arthritis stopped from trying any athletic or rigorous activities, or interfaced with your ability to do these activities: No  Have you been limited your ability to do normal daily activities in the past week: No  Did you needed help from other people to do normal activities in the past week: No  Have you used any aids or devices to help you do normal daily activities in the past week: No  Important Medical Events  (COIN) Patient has experienced drug-related serious adverse events since last encounter?: No         Examination:   Blood pressure 111/82, pulse 79, temperature 98  F (36.7  C), temperature source Oral, height 1.607 m (5' 3.27\"), weight 59.3 kg (130 lb 11.7 oz).  Normalized weight-for-age data not available for patients older than 20 years.  Blood pressure percentiles are not available for patients who are 18 years or older.    Geri appears generally well and in good spirits.  Head: Normal head and hair.  Eyes: No scleral injection, pupils normal.  Ears: Normal external structures, tympanic membranes.  Nose: No cartilage " deformity, congestion.  Mouth: Normal teeth, gums, tongue, mucosa.  Throat: Normal, without erythema or exudate.  Neck: Normal, without thyromegaly  Nodes: No cervical, supraclavicular, axillary, inguinal adenopathy.  Lungs: Normal effort, clear to ausculation.  Heart: Regular rate and rhythm, S1 and S2, no murmurs; normal peripheral pulses and perfusion.  Abdomen: Soft, non-tender, without hepatomegaly, splenomegaly, or masses.  Skin: Mild facial acne.  There is some mild thickening and hyperpigmentation of the skin over the bridge of the nose.  It is not clear that there is a malar rash but there is some change on the cheeks it may relate to acne.  Normal scratches and bruises.  Nails: No pitting, infection.  Neurological: Alert, appropriately interactive, normal cranial nerves, no deficits, normal gait walking.   Musculoskeletal: No evidence of current synovitis of the cervical spine, TMJ, sternoclavicular, acromioclavicular, glenohumeral, elbow, wrist, finger, lumbar spine, hip, knee, ankle, or toe joints. No tendonitis or bursitis.    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  She is doing really quite well at this time which is a surprise given that her labs were definitely trending in the wrong direction last time I saw her.  With a little bit of change to the skin of the face who would also keep in mind the possibility of MCTD or even SLE  when doing any follow-up.  We debated whether to do any follow-up today as we did not take any action based on the testing last time.  Another alternative besides follow-up laboratory studies would be to do follow-up imaging.  She last had radiographs just 9 months ago so it is too early to do plain radiographs.  I mentioned that ultrasound and MRI can be much more informative than radiographs but are not particularly practical techniques in this situation due to the amount of time it would take to survey all these joints.    Change Since Last Visit: Same  ACR Functional Class: Normal  (COIN) Provider Global Assessment Of Disease Activity: 1  (This is measured on the scale of 0 - 10)  [id number] is 1   (This is measured on the scale of 0 - 30).  (COIN) On Medication For Treatment Of GENE?: No  (COIN) ESR elevated due to GENE?: Yes  (COIN) CRP elevated due to GENE?: No  Health counseling reviewed:          Plan:     Orders Placed This Encounter   Procedures     Rheumatoid factor     CBC with platelets differential     CRP inflammation     Cyclic Citrullinated Peptide Antibody IgG     Erythrocyte sedimentation rate auto     IgG     Anti Nuclear Mojgan IgG by IFA with Reflex     EZ antibody panel     DNA double stranded antibodies     1. Laboratory tests today to monitor disease activity.  2. No imaging is needed today.  See above comments.  3. Continue eye examinations for uveitis monitoring every 12 months.  She is due.  4. Physical activity as tolerated.  What one can do tells us how well someone is doing, and is healthy for individuals with arthritis.   5. Medications: Will not be restarted at this time..  6. Follow-up in 6 months.    If there are any new questions or concerns, I would be glad to help and can be reached through our main office at [phone] or our paging  at [phone].    Hardy Reese MD    This note was dictated and might contain unintended typographical errors missed in proofreading.   If there are questions/concerns, please contact the author.         Addendum:  Laboratory Investigations:   Laboratory investigations performed today for which results were available at the time of this note are listed below.  Pending labs will be reported in a separate letter.    The preliminary results again indicate inflammationn.  Results for orders placed or performed in visit on 12/31/19   CBC with platelets differential     Status: None   Result Value Ref Range    WBC 6.8 4.0 - 11.0 10e9/L    RBC Count 4.47 3.8 - 5.2 10e12/L    Hemoglobin 13.1 11.7 - 15.7 g/dL    Hematocrit 40.7 35.0 - 47.0 %    MCV 91 78 - 100 fl    MCH 29.3 26.5 - 33.0 pg    MCHC 32.2 31.5 - 36.5 g/dL    RDW 12.4 10.0 - 15.0 %    Platelet Count 285 150 - 450 10e9/L    Diff Method Automated Method     % Neutrophils 67.0 %    % Lymphocytes 22.8 %    % Monocytes 7.0 %    % Eosinophils 2.2 %    % Basophils 0.7 %    % Immature Granulocytes 0.3 %    Nucleated RBCs 0 0 /100    Absolute Neutrophil 4.6 1.6 - 8.3 10e9/L    Absolute Lymphocytes 1.6 0.8 - 5.3 10e9/L    Absolute Monocytes 0.5 0.0 - 1.3 10e9/L    Absolute Eosinophils 0.2 0.0 - 0.7 10e9/L    Absolute Basophils 0.1 0.0 - 0.2 10e9/L    Abs Immature Granulocytes 0.0 0 - 0.4 10e9/L    Absolute Nucleated RBC 0.0    CRP inflammation     Status: None   Result Value Ref Range    CRP Inflammation <2.9 0.0 - 8.0 mg/L   Erythrocyte sedimentation rate auto     Status: Abnormal   Result Value Ref Range    Sed Rate 48 (H) 0 - 20 mm/h        Hardy Reese MD    CC  Patient Care Team:  Elieser Sena MD as PCP - General (Pediatrics)  Cornelius Leonardo MD as MD (Ophthalmology)    Copy to patient  Kaci Valadez,Delbert  [address] [facility] [place] [address]

## 2019-12-31 NOTE — PATIENT INSTRUCTIONS
[facility] Pediatric Rheumat[facility]    For Help:  The Pediatric Call Center at [phone] can help with scheduling of routine follow up visits.  Antonia Owens and Little Huddleston are the Nurse Coordinators for the Division of Pediatric Rheumatology and can be reached directly at [phone]. They can help with questions about your child s rheumatic condition, medications, and test results.  For emergencies after hours or on the weekends, please call the page  at [phone] and ask to speak to the physician on-call for Pediatric Rheumatology. Please do not use [employer] for urgent requests.  Main  Services:  [phone]  o Hmong/Turkmen/Icelandic: [phone]  o North Korean: [phone]  o Pashto: [phone]    For Patient Education Materials:  [email or web address][email or web address]/[place]

## 2020-01-02 LAB
ANA PAT SER IF-IMP: ABNORMAL
ANA SER QL IF: ABNORMAL
ANA TITR SER IF: ABNORMAL {TITER}
CCP AB SER IA-ACNC: >340 U/ML
DSDNA AB SER-ACNC: 5 IU/ML
IGG SERPL-MCNC: 1792 MG/DL (ref 610–1616)

## 2020-01-06 LAB — RHEUMATOID FACT SER NEPH-ACNC: 180 IU/ML (ref 0–20)

## 2020-01-07 LAB
ENA RNP IGG SER IA-ACNC: 0.6 AI (ref 0–0.9)
ENA SM IGG SER-ACNC: <0.2 AI (ref 0–0.9)
ENA SS-A IGG SER IA-ACNC: <0.2 AI (ref 0–0.9)
ENA SS-B IGG SER IA-ACNC: <0.2 AI (ref 0–0.9)

## 2020-09-02 ENCOUNTER — VIRTUAL VISIT (OUTPATIENT)
Dept: RHEUMATOLOGY | Facility: CLINIC | Age: 20
End: 2020-09-02
Attending: PEDIATRICS
Payer: COMMERCIAL

## 2020-09-02 DIAGNOSIS — M05.79 RHEUMATOID ARTHRITIS INVOLVING MULTIPLE SITES WITH POSITIVE RHEUMATOID FACTOR (H): Primary | Chronic | ICD-10-CM

## 2020-09-02 NOTE — LETTER
"  9/2/2020      RE: Geri Valadez  [address]  [place] [address]       Geri Valadez is a 19 year old female who is being evaluated via a billable video visit.      The patient has been notified of following:     \"This video visit will be conducted via a call between you and your physician/provider. We have found that certain health care needs can be provided without the need for an in-person physical exam.  This service lets us provide the care you need with a video conversation.  If a prescription is necessary we can send it directly to your pharmacy.  If lab work is needed we can place an order for that and you can then stop by our lab to have the test done at a later time.    Video visits are billed at different rates depending on your insurance coverage.  Please reach out to your insurance provider with any questions.    If during the course of the call the physician/provider feels a video visit is not appropriate, you will not be charged for this service.\"    Patient has given verbal consent for Video visit? Yes  How would you like to obtain your AVS? MyChart  If you are dropped from the video visit, the video invite should be resent to:  Will anyone else be joining your video visit? No    Jenifer Natarajan LPN        Rheumatology History:   Date of symptom onset: 2/14/2016  Date of first visit to center: 3/7/2016  Date of GENE diagnosis: 3/7/2016  ILAR category: polyarticular (RF-positive)  KAYLEEN Status: positive   RF Status: positive   CCP Status: positive   HLA-B27 Status: not done        Ophthalmology History:   Iritis/Uveitis Comorbidity: no   Date of last eye exam: 8/26/2020          Medications:   As of completion of this visit:  Current Outpatient Medications   Medication Sig Dispense Refill     Multiple Vitamins-Minerals (MULTIVITAMIN PO)        Omega-3 Fatty Acids (OMEGA 3 PO)        folic acid (FOLVITE) 1 MG tablet Take 1 tablet (1 mg) by mouth daily (Patient not taking: Reported on 9/2/2020) " 30 tablet 11     Date of last TB Screen:           Allergies:   No Known Allergies        Problem list:     Patient Active Problem List    Diagnosis Date Noted     Cyclic citrullinated peptide (CCP) antibody positive 03/07/2016     Positive antinuclear antibody 03/07/2016     High total IgG 03/07/2016     Elevated rheumatoid factor 02/24/2016     Rheumatoid arthritis involving multiple sites with positive rheumatoid factor (H) 02/24/2016     Treat to target goal (8/16/2019):  low disease activity.                Subjective:   Geri is a 19 year old female who was seen for a Pediatric Rheumatology Clinic video visit today.  Geri was last seen in our clinic on 12/31/2019 and today is accompanied by father (just helping with video).  The encounter diagnosis was Rheumatoid arthritis involving multiple sites with positive rheumatoid factor (H).      Goals for the visit include routine check-in.  Since I last saw her she is done relatively well.  She has not had any significant illnesses.  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  She did well all through the summer until 2 weeks ago when she started to notice some trouble in particular with her right elbow losing extension, her wrists bothering her, and her knee is getting sore and somewhat swollen.  The right knee was worse than the left knee.  She had trouble with stairs.  Her self-report scores over the past week are as listed below.  However really has not had a functional impact, and she has no major concerns about going back to school.  In fact, she reports that she did so well over the summer while not on therapy that she was working out 4-5 times a week without a flare.  Exactly why she is having a flare currently is unclear.      She is entering her livia year of college at the [employer] and will be living on or near campus.  Comprehensive Review of Systems is negative for any new health  concerns.    Information per our standardized questionnaire is as below:    Self Report  Patient Pain Status: 4  Patient Global Assessment of Disease Activity: 3.5     Patient Hightest Level of Education: some college or technical school     Arthritis History  Morning Stiffness in the past week: no stiffness  Recent Back Pain: No    Has your arthritis stopped from trying any athletic or rigorous activities or interfaced with your ability to do these activities? No  Have you been limited your ability to do normal daily activities in the past week? No  Did you need help from other people to do normal activities in the past week? No  Have you used any aids or devices to help you do normal daily activities in the past week? No    Important Medical Events  Patient has experienced drug-related serious adverse events since last encounter?: No               Examination:   There were no vitals taken for this visit.  Facility age limit for growth percentiles is 20 years.  Growth percentile SmartLinks can only be used for patients less than 20 years old.  There is no height or weight on file to calculate BSA.     Geri appears generally well and in good spirits.  Head: Normal appearing head and hair.  Eyes: No obvious scleral injection, normal tracking.  Nose: No cartilage deformity, congestion.  Lungs: Normal effort, no apparent shortness of breath, and normal speech.  Skin: No inflammatory lesions on limited inspection   Neurological: Alert, appropriately interactive, no deficits, normal movement within the setting of the video.  Musculoskeletal: No evidence of arthritis on inspection of cervical spine, TMJ, shoulder, left elbow, wrists, fingers, lumbar spine, hips, knees, ankles, or toe joints looking for evidence of swelling, loss of range of motion, pain on range of motion, or increased warmth to her touch (she reports her knees are about the same temperature as the rest of her legs).  The right elbow appears to be  missing about 15 degrees of extension.  She felt a little bit tight on supination.  For flexion she was slightly tighter than on the left side but she could touch her thumb to her shoulder.    Total active joints:  1   Total limited joints:  1         Last Lab Results:     No visits with results within 1 Day(s) from this visit.   Latest known visit with results is:   Office Visit on 12/31/2019   Component Date Value     Rheumatoid Factor 12/31/2019 180*     WBC 12/31/2019 6.8      RBC Count 12/31/2019 4.47      Hemoglobin 12/31/2019 13.1      Hematocrit 12/31/2019 40.7      MCV 12/31/2019 91      MCH 12/31/2019 29.3      MCHC 12/31/2019 32.2      RDW 12/31/2019 12.4      Platelet Count 12/31/2019 285      Diff Method 12/31/2019 Automated Method      % Neutrophils 12/31/2019 67.0      % Lymphocytes 12/31/2019 22.8      % Monocytes 12/31/2019 7.0      % Eosinophils 12/31/2019 2.2      % Basophils 12/31/2019 0.7      % Immature Granulocytes 12/31/2019 0.3      Nucleated RBCs 12/31/2019 0      Absolute Neutrophil 12/31/2019 4.6      Absolute Lymphocytes 12/31/2019 1.6      Absolute Monocytes 12/31/2019 0.5      Absolute Eosinophils 12/31/2019 0.2      Absolute Basophils 12/31/2019 0.1      Abs Immature Granulocytes 12/31/2019 0.0      Absolute Nucleated RBC 12/31/2019 0.0      CRP Inflammation 12/31/2019 <2.9      Cyclic Citrullinated Pep* 12/31/2019 >340*     Sed Rate 12/31/2019 48*     IGG 12/31/2019 1,792*     KAYLEEN interpretation 12/31/2019 Borderline Positive*     KAYLEEN pattern 1 12/31/2019 SPECKLED      KAYLEEN titer 1 12/31/2019 1:40      RNP Antibody IgG 12/31/2019 0.6      Walsh EZ Antibody IgG 12/31/2019 <0.2      SSA (Ro) (EZ) Antibody,* 12/31/2019 <0.2      SSB (La) (EZ) Antibody,* 12/31/2019 <0.2      DNA-ds 12/31/2019 5             Assessment:     Rheumatoid arthritis with a history of erosive changes.  We know from previous laboratory studies that her inflammatory markers and her antibody production we are  increasing while off therapy but she was looking remarkably good by exam.  I had previously discussed the discrepancy that can occur between symptoms, signs, and erosive damage.  I really prefer to see someone with arthritis for type on maintenance medicine to ensure tight control and preserve best long-term joint function.  I am not concerned that the problem with the elbow is gone on for a few weeks but if it lasts for another month then at that point I think intervention really is essential as we would no longer be able to consider this a transient flare.    As far as possible interventions, I mention that we always want to be aware of whether there was any sort of triggering illness and there is nothing apparent in her case.  Next we could consider any of the therapies she is used in the past, as well as also consider direct intra-articular injection, particularly if there is only a single joint involvement.  Injecting the right elbow with 40 to 80 mg of Kenalog would probably very quickly settle down swelling and allow the return of normal range of motion and function.  I mentioned that if this elbow is not mobilized in a timely manner, it could have permanent loss of range of motion.    I mention the possibility of doing labs or x-rays, but we really have not used any of the recent results to guide therapeutic decisions.  As such I think the main thing to do is just continued close observation and reconsideration of therapy depending on how things are evolving over the coming weeks.  I reviewed with her that in terms of other therapies that she has been on in the past, it is interesting that during the COVID-19 pandemic we have not seen problems related to any of her meds, and there is actually been a suggestion from a large registry that anti-TNF therapy reduces the severity of the inflammatory response that has landed adults in the hospital with pulmonary complications.  The one thing that does make me  hesitant is the use of oral prednisone, which is what people often ask for during flares.  I reviewed that doses of 10 mg a day or more are associated with increased risk of complications from COVID-19 infection.  None of this discussion about the medications relates however to whether or not she might get the COVID-19 pandemic.  That is none of them create a protective shield and therefore all standard precautions should be followed during this time to minimize her chance of getting it.    Health counseling reviewed: infection, medication side effect, transition  Provider assessment of disease activity: 1  Is patient on medication for the treatment of GENE: No    Treat to Target:   cCLKUA85 score: 5.5  Treatment target set: No   Treatment target:     Disease activity:     Physical function:     Use of algorithm: No          Plan:     1. No laboratory tests today.  2. No imaging is needed today.  3. No new referrals made today.  4. Eye examinations for uveitis monitoring every 12 months as outlined above.  5. Physical activity as tolerated.  What one can do tells us how well someone is doing, and is healthy for individuals with arthritis.   6. Medications: No changes today.  7. Follow-up in about 4 weeks or sooner if she wants to take further action therapeutically.  Be better to have an in person visit so we can decide whether it really is a monoarticular process for which joint injection is appropriate or whether it is a polyarticular process which would justify starting systemic therapy again such as anti-TNF therapy.  8. I reviewed with her that any follow-up in 2021 will need to be with a different provider but we can work on facilitating a smooth handoff.  I mention 1 of our fellows who is training in both adult rheumatology and pediatric rheumatology, the adult rheumatology clinic on campus, and [facility] in [place] (her family lives in [place]) as great convenient  options.    If there are any new questions or concerns, I would be glad to help and can be reached through our main office at [phone] or our paging  at [phone].    This note was dictated and might contain unintended typographical errors missed in proofreading.  If there are questions/concerns, please contact the author.    Hardy Reese MD    [facility]  [facility]  Patient was at home.  Start time:  12:30 PM  Finish time:  1:04 PM      CC  Patient Care Team:  Elieser Sena MD as PCP - General (Pediatrics)  Cornelius Leonardo MD as MD (Ophthalmology)    Copy to patient  RoryMaluDelbert Ocampo  [address] [place] [address]

## 2020-09-02 NOTE — PATIENT INSTRUCTIONS
For Patient Education Materials:  z.[facility]/[place]       [facility] Pediatric [facility]umat[facility]    For Help:  The Pediatric Call Center at [phone] can help with scheduling of routine follow up visits.  Antonia Owens and Little Huddleston are the Nurse Coordinators for the Division of Pediatric Rheumatology and can be reached by phone at [phone] or through [employer] ([email or web address]). They can help with questions about your child s rheumatic condition, medications, and test results.  For emergencies after hours or on the weekends, please call the page  at [phone] and ask to speak to the physician on-call for Pediatric Rheumatology. Please do not use [employer] for urgent requests.  Main  Services:  [phone]  o Hmong/Kyrgyz/Trung: [phone]  o Nepalese: [phone]  o Irish: [phone]    Internal Referrals: If we refer your child to another physician/team within [facility], you should receive a call to set this up. If you do not hear anything within a week, please call the Call Center at [phone].    External Referrals: If we refer your child to a physician/team outside of [facility], our team will send the referral order and relevant records to them. We ask that you call the place where your child is being referred to ensure they received the needed information and notify our team coordinators if not.    Imaging: If your child needs an imaging study that is not being performed the day of your clinic appointment, please call to set this up. For xrays, ultrasounds, and echocardiogram call [phone]. For CT or MRI call [phone].     MyChart: We encourage you to sign up for [employer] at [email or web address]. For assistance or questions, call [phone]. If your child is 12 years or older, a consent for proxy/parent access needs to be signed so please discuss this with your physician at the next visit.

## 2020-09-02 NOTE — NURSING NOTE
Chief Complaint   Patient presents with     Follow Up     RA     There were no vitals filed for this visit.  Jenifer Natarajan LPN  September 2, 2020

## 2020-09-02 NOTE — PROGRESS NOTES
"Geri Valadez is a 19 year old female who is being evaluated via a billable video visit.      The patient has been notified of following:     \"This video visit will be conducted via a call between you and your physician/provider. We have found that certain health care needs can be provided without the need for an in-person physical exam.  This service lets us provide the care you need with a video conversation.  If a prescription is necessary we can send it directly to your pharmacy.  If lab work is needed we can place an order for that and you can then stop by our lab to have the test done at a later time.    Video visits are billed at different rates depending on your insurance coverage.  Please reach out to your insurance provider with any questions.    If during the course of the call the physician/provider feels a video visit is not appropriate, you will not be charged for this service.\"    Patient has given verbal consent for Video visit? Yes  How would you like to obtain your AVS? MyChart  If you are dropped from the video visit, the video invite should be resent to:  Will anyone else be joining your video visit? No    Jenifer Natarajan LPN        Rheumatology History:   Date of symptom onset: 2/14/2016  Date of first visit to center: 3/7/2016  Date of GENE diagnosis: 3/7/2016  ILAR category: polyarticular (RF-positive)  KAYLEEN Status: positive   RF Status: positive   CCP Status: positive   HLA-B27 Status: not done        Ophthalmology History:   Iritis/Uveitis Comorbidity: no   Date of last eye exam: 8/26/2020          Medications:   As of completion of this visit:  Current Outpatient Medications   Medication Sig Dispense Refill     Multiple Vitamins-Minerals (MULTIVITAMIN PO)        Omega-3 Fatty Acids (OMEGA 3 PO)        folic acid (FOLVITE) 1 MG tablet Take 1 tablet (1 mg) by mouth daily (Patient not taking: Reported on 9/2/2020) 30 tablet 11     Date of last TB Screen:           Allergies:   No Known " Allergies        Problem list:     Patient Active Problem List    Diagnosis Date Noted     Cyclic citrullinated peptide (CCP) antibody positive 03/07/2016     Positive antinuclear antibody 03/07/2016     High total IgG 03/07/2016     Elevated rheumatoid factor 02/24/2016     Rheumatoid arthritis involving multiple sites with positive rheumatoid factor (H) 02/24/2016     Treat to target goal (8/16/2019):  low disease activity.                Subjective:   Geri is a 19 year old female who was seen for a Pediatric Rheumatology Clinic video visit today.  Geri was last seen in our clinic on 12/31/2019 and today is accompanied by father (just helping with video).  The encounter diagnosis was Rheumatoid arthritis involving multiple sites with positive rheumatoid factor (H).      Goals for the visit include routine check-in.  Since I last saw her she is done relatively well.  She has not had any significant illnesses.  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  She did well all through the summer until 2 weeks ago when she started to notice some trouble in particular with her right elbow losing extension, her wrists bothering her, and her knee is getting sore and somewhat swollen.  The right knee was worse than the left knee.  She had trouble with stairs.  Her self-report scores over the past week are as listed below.  However really has not had a functional impact, and she has no major concerns about going back to school.  In fact, she reports that she did so well over the summer while not on therapy that she was working out 4-5 times a week without a flare.  Exactly why she is having a flare currently is unclear.      She is entering her livia year of college at the [employer] and will be living on or near campus.  Comprehensive Review of Systems is negative for any new health concerns.    Information per our standardized questionnaire is as  below:    Self Report  Patient Pain Status: 4  Patient Global Assessment of Disease Activity: 3.5     Patient Hightest Level of Education: some college or technical school     Arthritis History  Morning Stiffness in the past week: no stiffness  Recent Back Pain: No    Has your arthritis stopped from trying any athletic or rigorous activities or interfaced with your ability to do these activities? No  Have you been limited your ability to do normal daily activities in the past week? No  Did you need help from other people to do normal activities in the past week? No  Have you used any aids or devices to help you do normal daily activities in the past week? No    Important Medical Events  Patient has experienced drug-related serious adverse events since last encounter?: No               Examination:   There were no vitals taken for this visit.  Facility age limit for growth percentiles is 20 years.  Growth percentile SmartLinks can only be used for patients less than 20 years old.  There is no height or weight on file to calculate BSA.     Geri appears generally well and in good spirits.  Head: Normal appearing head and hair.  Eyes: No obvious scleral injection, normal tracking.  Nose: No cartilage deformity, congestion.  Lungs: Normal effort, no apparent shortness of breath, and normal speech.  Skin: No inflammatory lesions on limited inspection   Neurological: Alert, appropriately interactive, no deficits, normal movement within the setting of the video.  Musculoskeletal: No evidence of arthritis on inspection of cervical spine, TMJ, shoulder, left elbow, wrists, fingers, lumbar spine, hips, knees, ankles, or toe joints looking for evidence of swelling, loss of range of motion, pain on range of motion, or increased warmth to her touch (she reports her knees are about the same temperature as the rest of her legs).  The right elbow appears to be missing about 15 degrees of extension.  She felt a little bit tight on  supination.  For flexion she was slightly tighter than on the left side but she could touch her thumb to her shoulder.    Total active joints:  1   Total limited joints:  1         Last Lab Results:     No visits with results within 1 Day(s) from this visit.   Latest known visit with results is:   Office Visit on 12/31/2019   Component Date Value     Rheumatoid Factor 12/31/2019 180*     WBC 12/31/2019 6.8      RBC Count 12/31/2019 4.47      Hemoglobin 12/31/2019 13.1      Hematocrit 12/31/2019 40.7      MCV 12/31/2019 91      MCH 12/31/2019 29.3      MCHC 12/31/2019 32.2      RDW 12/31/2019 12.4      Platelet Count 12/31/2019 285      Diff Method 12/31/2019 Automated Method      % Neutrophils 12/31/2019 67.0      % Lymphocytes 12/31/2019 22.8      % Monocytes 12/31/2019 7.0      % Eosinophils 12/31/2019 2.2      % Basophils 12/31/2019 0.7      % Immature Granulocytes 12/31/2019 0.3      Nucleated RBCs 12/31/2019 0      Absolute Neutrophil 12/31/2019 4.6      Absolute Lymphocytes 12/31/2019 1.6      Absolute Monocytes 12/31/2019 0.5      Absolute Eosinophils 12/31/2019 0.2      Absolute Basophils 12/31/2019 0.1      Abs Immature Granulocytes 12/31/2019 0.0      Absolute Nucleated RBC 12/31/2019 0.0      CRP Inflammation 12/31/2019 <2.9      Cyclic Citrullinated Pep* 12/31/2019 >340*     Sed Rate 12/31/2019 48*     IGG 12/31/2019 1,792*     KAYLEEN interpretation 12/31/2019 Borderline Positive*     KAYLEEN pattern 1 12/31/2019 SPECKLED      KAYLEEN titer 1 12/31/2019 1:40      RNP Antibody IgG 12/31/2019 0.6      Walsh EZ Antibody IgG 12/31/2019 <0.2      SSA (Ro) (EZ) Antibody,* 12/31/2019 <0.2      SSB (La) (EZ) Antibody,* 12/31/2019 <0.2      DNA-ds 12/31/2019 5             Assessment:     Rheumatoid arthritis with a history of erosive changes.  We know from previous laboratory studies that her inflammatory markers and her antibody production we are increasing while off therapy but she was looking remarkably good by exam.   I had previously discussed the discrepancy that can occur between symptoms, signs, and erosive damage.  I really prefer to see someone with arthritis for type on maintenance medicine to ensure tight control and preserve best long-term joint function.  I am not concerned that the problem with the elbow is gone on for a few weeks but if it lasts for another month then at that point I think intervention really is essential as we would no longer be able to consider this a transient flare.    As far as possible interventions, I mention that we always want to be aware of whether there was any sort of triggering illness and there is nothing apparent in her case.  Next we could consider any of the therapies she is used in the past, as well as also consider direct intra-articular injection, particularly if there is only a single joint involvement.  Injecting the right elbow with 40 to 80 mg of Kenalog would probably very quickly settle down swelling and allow the return of normal range of motion and function.  I mentioned that if this elbow is not mobilized in a timely manner, it could have permanent loss of range of motion.    I mention the possibility of doing labs or x-rays, but we really have not used any of the recent results to guide therapeutic decisions.  As such I think the main thing to do is just continued close observation and reconsideration of therapy depending on how things are evolving over the coming weeks.  I reviewed with her that in terms of other therapies that she has been on in the past, it is interesting that during the COVID-19 pandemic we have not seen problems related to any of her meds, and there is actually been a suggestion from a large registry that anti-TNF therapy reduces the severity of the inflammatory response that has landed adults in the hospital with pulmonary complications.  The one thing that does make me hesitant is the use of oral prednisone, which is what people often ask for  during flares.  I reviewed that doses of 10 mg a day or more are associated with increased risk of complications from COVID-19 infection.  None of this discussion about the medications relates however to whether or not she might get the COVID-19 pandemic.  That is none of them create a protective shield and therefore all standard precautions should be followed during this time to minimize her chance of getting it.    Health counseling reviewed: infection, medication side effect, transition  Provider assessment of disease activity: 1  Is patient on medication for the treatment of GENE: No    Treat to Target:   [id number] score: 5.5  Treatment target set: No   Treatment target:     Disease activity:     Physical function:     Use of algorithm: No          Plan:     1. No laboratory tests today.  2. No imaging is needed today.  3. No new referrals made today.  4. Eye examinations for uveitis monitoring every 12 months as outlined above.  5. Physical activity as tolerated.  What one can do tells us how well someone is doing, and is healthy for individuals with arthritis.   6. Medications: No changes today.  7. Follow-up in about 4 weeks or sooner if she wants to take further action therapeutically.  Be better to have an in person visit so we can decide whether it really is a monoarticular process for which joint injection is appropriate or whether it is a polyarticular process which would justify starting systemic therapy again such as anti-TNF therapy.  8. I reviewed with her that any follow-up in 2021 will need to be with a different provider but we can work on facilitating a smooth handoff.  I mention 1 of our fellows who is training in both adult rheumatology and pediatric rheumatology, the adult rheumatology clinic on campus, and [facility] in [place] (her family lives in [place]) as great convenient options.    If there are any new questions or concerns, I would be glad to help and can  be reached through our main office at [phone] or our paging  at [phone].    This note was dictated and might contain unintended typographical errors missed in proofreading.  If there are questions/concerns, please contact the author.    Hardy Reese MD    Video Platform:  Doris  Patient was at home.  Start time:  12:30 PM  Finish time:  1:04 PM      CC  Patient Care Team:  Elieser Sena MD as PCP - General (Pediatrics)  Hardy Reese MD as MD (Pediatric Rheumatology)  Cornelius Leonardo MD as MD (Ophthalmology)  ELIESER SENA    Copy to patient  Kaci Valadez RoryDelbert  [id number] [place] [address]

## 2020-09-02 NOTE — LETTER
"9/2/2020      RE: Geri Valadez  [address]  [place] [address]       Geri Valadez is a 19 year old female who is being evaluated via a billable video visit.      The patient has been notified of following:     \"This video visit will be conducted via a call between you and your physician/provider. We have found that certain health care needs can be provided without the need for an in-person physical exam.  This service lets us provide the care you need with a video conversation.  If a prescription is necessary we can send it directly to your pharmacy.  If lab work is needed we can place an order for that and you can then stop by our lab to have the test done at a later time.    Video visits are billed at different rates depending on your insurance coverage.  Please reach out to your insurance provider with any questions.    If during the course of the call the physician/provider feels a video visit is not appropriate, you will not be charged for this service.\"    Patient has given verbal consent for Video visit? Yes  How would you like to obtain your AVS? MyChart  If you are dropped from the video visit, the video invite should be resent to:  Will anyone else be joining your video visit? No    Jenifer Natarajan LPN        Rheumatology History:   Date of symptom onset: 2/14/2016  Date of first visit to center: 3/7/2016  Date of GENE diagnosis: 3/7/2016  ILAR category: polyarticular (RF-positive)  KAYLEEN Status: positive   RF Status: positive   CCP Status: positive   HLA-B27 Status: not done        Ophthalmology History:   Iritis/Uveitis Comorbidity: no   Date of last eye exam: 8/26/2020          Medications:   As of completion of this visit:  Current Outpatient Medications   Medication Sig Dispense Refill     Multiple Vitamins-Minerals (MULTIVITAMIN PO)        Omega-3 Fatty Acids (OMEGA 3 PO)        folic acid (FOLVITE) 1 MG tablet Take 1 tablet (1 mg) by mouth daily (Patient not taking: Reported on 9/2/2020) 30 " tablet 11     Date of last TB Screen:           Allergies:   No Known Allergies        Problem list:     Patient Active Problem List    Diagnosis Date Noted     Cyclic citrullinated peptide (CCP) antibody positive 03/07/2016     Positive antinuclear antibody 03/07/2016     High total IgG 03/07/2016     Elevated rheumatoid factor 02/24/2016     Rheumatoid arthritis involving multiple sites with positive rheumatoid factor (H) 02/24/2016     Treat to target goal (8/16/2019):  low disease activity.                Subjective:   Geri is a 19 year old female who was seen for a Pediatric Rheumatology Clinic video visit today.  Geri was last seen in our clinic on 12/31/2019 and today is accompanied by father (just helping with video).  The encounter diagnosis was Rheumatoid arthritis involving multiple sites with positive rheumatoid factor (H).      Goals for the visit include routine check-in.  Since I last saw her she is done relatively well.  She has not had any significant illnesses.  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  She did well all through the summer until 2 weeks ago when she started to notice some trouble in particular with her right elbow losing extension, her wrists bothering her, and her knee is getting sore and somewhat swollen.  The right knee was worse than the left knee.  She had trouble with stairs.  Her self-report scores over the past week are as listed below.  However really has not had a functional impact, and she has no major concerns about going back to school.  In fact, she reports that she did so well over the summer while not on therapy that she was working out 4-5 times a week without a flare.  Exactly why she is having a flare currently is unclear.      She is entering her livia year of college at the [employer] and will be living on or near campus.  Comprehensive Review of Systems is negative for any new health  concerns.    Information per our standardized questionnaire is as below:    Self Report  Patient Pain Status: 4  Patient Global Assessment of Disease Activity: 3.5     Patient Hightest Level of Education: some college or technical school     Arthritis History  Morning Stiffness in the past week: no stiffness  Recent Back Pain: No    Has your arthritis stopped from trying any athletic or rigorous activities or interfaced with your ability to do these activities? No  Have you been limited your ability to do normal daily activities in the past week? No  Did you need help from other people to do normal activities in the past week? No  Have you used any aids or devices to help you do normal daily activities in the past week? No    Important Medical Events  Patient has experienced drug-related serious adverse events since last encounter?: No               Examination:   There were no vitals taken for this visit.  Facility age limit for growth percentiles is 20 years.  Growth percentile SmartLinks can only be used for patients less than 20 years old.  There is no height or weight on file to calculate BSA.     Geri appears generally well and in good spirits.  Head: Normal appearing head and hair.  Eyes: No obvious scleral injection, normal tracking.  Nose: No cartilage deformity, congestion.  Lungs: Normal effort, no apparent shortness of breath, and normal speech.  Skin: No inflammatory lesions on limited inspection   Neurological: Alert, appropriately interactive, no deficits, normal movement within the setting of the video.  Musculoskeletal: No evidence of arthritis on inspection of cervical spine, TMJ, shoulder, left elbow, wrists, fingers, lumbar spine, hips, knees, ankles, or toe joints looking for evidence of swelling, loss of range of motion, pain on range of motion, or increased warmth to her touch (she reports her knees are about the same temperature as the rest of her legs).  The right elbow appears to be  missing about 15 degrees of extension.  She felt a little bit tight on supination.  For flexion she was slightly tighter than on the left side but she could touch her thumb to her shoulder.    Total active joints:  1   Total limited joints:  1         Last Lab Results:     No visits with results within 1 Day(s) from this visit.   Latest known visit with results is:   Office Visit on 12/31/2019   Component Date Value     Rheumatoid Factor 12/31/2019 180*     WBC 12/31/2019 6.8      RBC Count 12/31/2019 4.47      Hemoglobin 12/31/2019 13.1      Hematocrit 12/31/2019 40.7      MCV 12/31/2019 91      MCH 12/31/2019 29.3      MCHC 12/31/2019 32.2      RDW 12/31/2019 12.4      Platelet Count 12/31/2019 285      Diff Method 12/31/2019 Automated Method      % Neutrophils 12/31/2019 67.0      % Lymphocytes 12/31/2019 22.8      % Monocytes 12/31/2019 7.0      % Eosinophils 12/31/2019 2.2      % Basophils 12/31/2019 0.7      % Immature Granulocytes 12/31/2019 0.3      Nucleated RBCs 12/31/2019 0      Absolute Neutrophil 12/31/2019 4.6      Absolute Lymphocytes 12/31/2019 1.6      Absolute Monocytes 12/31/2019 0.5      Absolute Eosinophils 12/31/2019 0.2      Absolute Basophils 12/31/2019 0.1      Abs Immature Granulocytes 12/31/2019 0.0      Absolute Nucleated RBC 12/31/2019 0.0      CRP Inflammation 12/31/2019 <2.9      Cyclic Citrullinated Pep* 12/31/2019 >340*     Sed Rate 12/31/2019 48*     IGG 12/31/2019 1,792*     KAYLEEN interpretation 12/31/2019 Borderline Positive*     KAYLEEN pattern 1 12/31/2019 SPECKLED      KAYLEEN titer 1 12/31/2019 1:40      RNP Antibody IgG 12/31/2019 0.6      Walsh EZ Antibody IgG 12/31/2019 <0.2      SSA (Ro) (EZ) Antibody,* 12/31/2019 <0.2      SSB (La) (EZ) Antibody,* 12/31/2019 <0.2      DNA-ds 12/31/2019 5             Assessment:     Rheumatoid arthritis with a history of erosive changes.  We know from previous laboratory studies that her inflammatory markers and her antibody production we are  increasing while off therapy but she was looking remarkably good by exam.  I had previously discussed the discrepancy that can occur between symptoms, signs, and erosive damage.  I really prefer to see someone with arthritis for type on maintenance medicine to ensure tight control and preserve best long-term joint function.  I am not concerned that the problem with the elbow is gone on for a few weeks but if it lasts for another month then at that point I think intervention really is essential as we would no longer be able to consider this a transient flare.    As far as possible interventions, I mention that we always want to be aware of whether there was any sort of triggering illness and there is nothing apparent in her case.  Next we could consider any of the therapies she is used in the past, as well as also consider direct intra-articular injection, particularly if there is only a single joint involvement.  Injecting the right elbow with 40 to 80 mg of Kenalog would probably very quickly settle down swelling and allow the return of normal range of motion and function.  I mentioned that if this elbow is not mobilized in a timely manner, it could have permanent loss of range of motion.    I mention the possibility of doing labs or x-rays, but we really have not used any of the recent results to guide therapeutic decisions.  As such I think the main thing to do is just continued close observation and reconsideration of therapy depending on how things are evolving over the coming weeks.  I reviewed with her that in terms of other therapies that she has been on in the past, it is interesting that during the COVID-19 pandemic we have not seen problems related to any of her meds, and there is actually been a suggestion from a large registry that anti-TNF therapy reduces the severity of the inflammatory response that has landed adults in the hospital with pulmonary complications.  The one thing that does make me  hesitant is the use of oral prednisone, which is what people often ask for during flares.  I reviewed that doses of 10 mg a day or more are associated with increased risk of complications from COVID-19 infection.  None of this discussion about the medications relates however to whether or not she might get the COVID-19 pandemic.  That is none of them create a protective shield and therefore all standard precautions should be followed during this time to minimize her chance of getting it.    Health counseling reviewed: infection, medication side effect, transition  Provider assessment of disease activity: 1  Is patient on medication for the treatment of GENE: No    Treat to Target:   tTQCJT11 score: 5.5  Treatment target set: No   Treatment target:     Disease activity:     Physical function:     Use of algorithm: No          Plan:     1. No laboratory tests today.  2. No imaging is needed today.  3. No new referrals made today.  4. Eye examinations for uveitis monitoring every 12 months as outlined above.  5. Physical activity as tolerated.  What one can do tells us how well someone is doing, and is healthy for individuals with arthritis.   6. Medications: No changes today.  7. Follow-up in about 4 weeks or sooner if she wants to take further action therapeutically.  Be better to have an in person visit so we can decide whether it really is a monoarticular process for which joint injection is appropriate or whether it is a polyarticular process which would justify starting systemic therapy again such as anti-TNF therapy.  8. I reviewed with her that any follow-up in 2021 will need to be with a different provider but we can work on facilitating a smooth handoff.  I mention 1 of our fellows who is training in both adult rheumatology and pediatric rheumatology, the adult rheumatology clinic on campus, and [facility] in [place] (her family lives in [place]) as great convenient  options.    If there are any new questions or concerns, I would be glad to help and can be reached through our main office at [phone] or our paging  at [phone].    This note was dictated and might contain unintended typographical errors missed in proofreading.  If there are questions/concerns, please contact the author.    Hardy Reese MD    Video Platform:  Doris  Patient was at home.  Start time:  12:30 PM  Finish time:  1:04 PM      CC  Patient Care Team:  Elieser Sena MD as PCP - General (Pediatrics)  Hardy Reese MD as MD (Pediatric Rheumatology)  Cornelius Leonardo MD as MD (Ophthalmology)  ELIESER SENA    Copy to patient    Parent(s) of Geri Rory  [address] [facility] [place] [address]

## 2020-09-02 NOTE — Clinical Note
Another patient who might or might not be of interest.  High titer RF and CCP with erosions.  Stopped all medicines and minimal sx despite lousy labs.

## 2020-09-02 NOTE — Clinical Note
Flaring and might need right elbow injection.  Not ready yet.  Please schedule for in-person follow up.  Moving back to campus soon so will be nearby.  Will call if she wants to come back sooner or start things sooner.

## 2020-10-08 ENCOUNTER — OFFICE VISIT (OUTPATIENT)
Dept: RHEUMATOLOGY | Facility: CLINIC | Age: 20
End: 2020-10-08
Attending: PEDIATRICS
Payer: COMMERCIAL

## 2020-10-08 ENCOUNTER — TELEPHONE (OUTPATIENT)
Dept: RHEUMATOLOGY | Facility: CLINIC | Age: 20
End: 2020-10-08

## 2020-10-08 VITALS
WEIGHT: 125.66 LBS | RESPIRATION RATE: 16 BRPM | HEART RATE: 72 BPM | TEMPERATURE: 98.5 F | DIASTOLIC BLOOD PRESSURE: 73 MMHG | HEIGHT: 63 IN | SYSTOLIC BLOOD PRESSURE: 105 MMHG | BODY MASS INDEX: 22.27 KG/M2

## 2020-10-08 DIAGNOSIS — Z23 NEED FOR INFLUENZA VACCINATION: ICD-10-CM

## 2020-10-08 DIAGNOSIS — M05.79 RHEUMATOID ARTHRITIS INVOLVING MULTIPLE SITES WITH POSITIVE RHEUMATOID FACTOR (H): Primary | Chronic | ICD-10-CM

## 2020-10-08 LAB
ALBUMIN SERPL-MCNC: 3.7 G/DL (ref 3.4–5)
ALP SERPL-CCNC: 65 U/L (ref 40–150)
ALT SERPL W P-5'-P-CCNC: 16 U/L (ref 0–50)
AST SERPL W P-5'-P-CCNC: 12 U/L (ref 0–45)
BASOPHILS # BLD AUTO: 0.1 10E9/L (ref 0–0.2)
BASOPHILS NFR BLD AUTO: 1 %
BILIRUB DIRECT SERPL-MCNC: <0.1 MG/DL (ref 0–0.2)
BILIRUB SERPL-MCNC: 0.3 MG/DL (ref 0.2–1.3)
CHOLEST SERPL-MCNC: 146 MG/DL
CREAT SERPL-MCNC: 0.55 MG/DL (ref 0.52–1.04)
DEPRECATED CALCIDIOL+CALCIFEROL SERPL-MC: 44 UG/L (ref 20–75)
DIFFERENTIAL METHOD BLD: NORMAL
EOSINOPHIL # BLD AUTO: 0.1 10E9/L (ref 0–0.7)
EOSINOPHIL NFR BLD AUTO: 1.8 %
ERYTHROCYTE [DISTWIDTH] IN BLOOD BY AUTOMATED COUNT: 12.1 % (ref 10–15)
GFR SERPL CREATININE-BSD FRML MDRD: >90 ML/MIN/{1.73_M2}
HBV SURFACE AB SERPL IA-ACNC: 0 M[IU]/ML
HCT VFR BLD AUTO: 39.9 % (ref 35–47)
HCV AB SERPL QL IA: NONREACTIVE
HDLC SERPL-MCNC: 55 MG/DL
HGB BLD-MCNC: 12.8 G/DL (ref 11.7–15.7)
IMM GRANULOCYTES # BLD: 0 10E9/L (ref 0–0.4)
IMM GRANULOCYTES NFR BLD: 0.4 %
LDLC SERPL CALC-MCNC: 75 MG/DL
LYMPHOCYTES # BLD AUTO: 2 10E9/L (ref 0.8–5.3)
LYMPHOCYTES NFR BLD AUTO: 27 %
MCH RBC QN AUTO: 29.1 PG (ref 26.5–33)
MCHC RBC AUTO-ENTMCNC: 32.1 G/DL (ref 31.5–36.5)
MCV RBC AUTO: 91 FL (ref 78–100)
MONOCYTES # BLD AUTO: 0.5 10E9/L (ref 0–1.3)
MONOCYTES NFR BLD AUTO: 6.2 %
NEUTROPHILS # BLD AUTO: 4.6 10E9/L (ref 1.6–8.3)
NEUTROPHILS NFR BLD AUTO: 63.6 %
NONHDLC SERPL-MCNC: 91 MG/DL
NRBC # BLD AUTO: 0 10*3/UL
NRBC BLD AUTO-RTO: 0 /100
PLATELET # BLD AUTO: 347 10E9/L (ref 150–450)
PROT SERPL-MCNC: 8.5 G/DL (ref 6.8–8.8)
RBC # BLD AUTO: 4.4 10E12/L (ref 3.8–5.2)
TRIGL SERPL-MCNC: 78 MG/DL
WBC # BLD AUTO: 7.3 10E9/L (ref 4–11)

## 2020-10-08 PROCEDURE — 250N000011 HC RX IP 250 OP 636

## 2020-10-08 PROCEDURE — 86803 HEPATITIS C AB TEST: CPT | Performed by: PEDIATRICS

## 2020-10-08 PROCEDURE — G0008 ADMIN INFLUENZA VIRUS VAC: HCPCS

## 2020-10-08 PROCEDURE — 80061 LIPID PANEL: CPT | Performed by: PEDIATRICS

## 2020-10-08 PROCEDURE — 82306 VITAMIN D 25 HYDROXY: CPT | Performed by: PEDIATRICS

## 2020-10-08 PROCEDURE — G0463 HOSPITAL OUTPT CLINIC VISIT: HCPCS

## 2020-10-08 PROCEDURE — 85025 COMPLETE CBC W/AUTO DIFF WBC: CPT | Performed by: PEDIATRICS

## 2020-10-08 PROCEDURE — 86200 CCP ANTIBODY: CPT | Performed by: PEDIATRICS

## 2020-10-08 PROCEDURE — 86706 HEP B SURFACE ANTIBODY: CPT | Performed by: PEDIATRICS

## 2020-10-08 PROCEDURE — 99213 OFFICE O/P EST LOW 20 MIN: CPT | Performed by: PEDIATRICS

## 2020-10-08 PROCEDURE — 90686 IIV4 VACC NO PRSV 0.5 ML IM: CPT

## 2020-10-08 PROCEDURE — 86431 RHEUMATOID FACTOR QUANT: CPT | Performed by: PEDIATRICS

## 2020-10-08 PROCEDURE — 80076 HEPATIC FUNCTION PANEL: CPT | Performed by: PEDIATRICS

## 2020-10-08 PROCEDURE — 36415 COLL VENOUS BLD VENIPUNCTURE: CPT | Performed by: PEDIATRICS

## 2020-10-08 PROCEDURE — 86481 TB AG RESPONSE T-CELL SUSP: CPT | Performed by: PEDIATRICS

## 2020-10-08 PROCEDURE — 82784 ASSAY IGA/IGD/IGG/IGM EACH: CPT | Performed by: PEDIATRICS

## 2020-10-08 PROCEDURE — 82565 ASSAY OF CREATININE: CPT | Performed by: PEDIATRICS

## 2020-10-08 ASSESSMENT — PAIN SCALES - GENERAL: PAINLEVEL: MILD PAIN (2)

## 2020-10-08 ASSESSMENT — MIFFLIN-ST. JEOR: SCORE: 1310.25

## 2020-10-08 NOTE — NURSING NOTE
"Chief Complaint   Patient presents with     Arthritis     Rheumatoid arthritis involving multiple sites with positive rheumatoid factor.     Vitals:    10/08/20 1010   BP: 105/73   BP Location: Right arm   Patient Position: Chair   Pulse: 72   Resp: 16   Temp: 98.5  F (36.9  C)   TempSrc: Tympanic   Weight: 125 lb 10.6 oz (57 kg)   Height: 5' 3.39\" (161 cm)      FLU VACCINE QUESTIONNAIRE:  Ask the following questions of all parties who want influenza vaccination:     CONTRAINDICATIONS  1.  Is the patient age less than 6 months?  NO  2.  Has the person to be vaccinated ever had Guillain-[place] syndrome? NO  3.  Has the person to be vaccinated had the vaccine this year? NO  4.  Is the person to be vaccinated sick today? NO  5.  Does the person to be vaccinated have an allergy to eggs or a component of the vaccine? NO  6.  Has the person to vaccinated ever had a serious reaction to an influenza vaccination in the past? NO    If the answer to ALL of the above questions is \"No\", then please administer the influenza vaccine per the standard protocol.  If the patient answered \"Yes\" to questions 1 or 2, do not administer the vaccine. If the patient answered \"Yes\" to question 3, do not administer the vaccine unless the patient is a child receiving the vaccine in two doses. If the patient answered \"Yes\" to questions 4, 5, and/or 6, get additional details on sickness and/or reaction and refer to provider. If you have any questions regarding contraindications, please refer to the provider.                                                         INFLUENZA VACCINATION NOTE      Information sheet given to patient and questions answered.     Patient or representative refused vaccination.   Reason:     ORDERS: Give influenza Vaccine   Ordered by Dr. Reese on October 8, 2020    [ Do not give Influenza Vaccine due to contraindication or refusal ]    Candidate for Pneumovax? No    INDICATION FOR VACCINATION:  Anyone from 6 months of age " or older.        Amberly Quinn M.A.        Amberly Quinn M.A.    October 8, 2020

## 2020-10-08 NOTE — LETTER
10/8/2020      RE: Geri THOMSON Rory  [address]  [place] [address]           Rheumatology History:   Date of symptom onset: 2/14/2016  Date of first visit to center: 3/7/2016  Date of GENE diagnosis: 3/7/2016  ILAR category: polyarticular (RF-positive)  KAYLEEN Status: positive   RF Status: positive   CCP Status: positive   HLA-B27 Status: not done        Ophthalmology History:   Iritis/Uveitis Comorbidity: no   Date of last eye exam: 8/24/2020          Medications:   As of completion of this visit:  Current Outpatient Medications   Medication Sig Dispense Refill     tofacitinib (XELJANZ) 11 MG 24 hr tablet Take 1 tablet (11 mg) by mouth daily 30 tablet 3     Multiple Vitamins-Minerals (MULTIVITAMIN PO)        Omega-3 Fatty Acids (OMEGA 3 PO)        Date of last TB Screen: 10/8/2020         Allergies:   No Known Allergies        Problem list:     Patient Active Problem List    Diagnosis Date Noted     Cyclic citrullinated peptide (CCP) antibody positive 03/07/2016     Positive antinuclear antibody 03/07/2016     High total IgG 03/07/2016     Elevated rheumatoid factor 02/24/2016     Rheumatoid arthritis involving multiple sites with positive rheumatoid factor (H) 02/24/2016     Treat to target goal (8/16/2019):  low disease activity.                Subjective:   Geri is a 20 year old female who was seen for a Pediatric Rheumatology Clinic visit today.  Geri was last seen in our video clinic on 9/2/2020 and today is accompanied by her parents.  The primary encounter diagnosis was Rheumatoid arthritis involving multiple sites with positive rheumatoid factor (H). A diagnosis of Need for influenza vaccination was also pertinent to this visit.      Goals for the visit include follow-up because of persistent difficulty with the right elbow.  She would like to discuss injection of it (see notes from last time).  Her self-report scores are listed below.  She marks her right elbow as well as her right middle and ring  "fingers as having difficulty.  She is living back on campus in an apartment but much for school work is done online.  She does enjoy getting out occasionally for a walk and is able to do so relatively well.  She has not had known exposure or illness related to COVID-19.  Comprehensive Review of Systems is otherwise negative for any new health concerns according to our questionnaire..    Other information per our standardized questionnaire is as below:    Self Report  Patient Pain Status: 2  Patient Global Assessment of Disease Activity: 2     Patient Hightest Level of Education: some college or technical school     Arthritis History  Morning Stiffness in the past week: 15 minutes or less  Recent Back Pain: No    Has your arthritis stopped from trying any athletic or rigorous activities or interfaced with your ability to do these activities? No  Have you been limited your ability to do normal daily activities in the past week? No  Did you need help from other people to do normal activities in the past week? No  Have you used any aids or devices to help you do normal daily activities in the past week? No    Important Medical Events  Patient has experienced drug-related serious adverse events since last encounter?: No                 Examination:   Blood pressure 105/73, pulse 72, temperature 98.5  F (36.9  C), temperature source Tympanic, resp. rate 16, height 1.61 m (5' 3.39\"), weight 57 kg (125 lb 10.6 oz).  Facility age limit for growth percentiles is 20 years.  Growth percentile SmartLinks can only be used for patients less than 20 years old.  Body surface area is 1.6 meters squared.     Geri appears generally well and in good spirits.  Head: Normal head and hair.  Eyes: No scleral injection, pupils normal.  Ears: Normal external structures, tympanic membranes.  Nose: No cartilage deformity, congestion.  Mouth: Normal teeth, gums, tongue, mucosa.  Throat: Normal, without erythema or exudate.  Lungs: Normal " effort  Heart: Normal peripheral perfusion.  Skin: No inflammatory lesions.  Normal scratches and bruises.  Nails: No pitting, infection.  Neurological: Alert, appropriately interactive, normal cranial nerves, no deficits, transfers well.  Musculoskeletal: No evidence of current synovitis of the cervical spine, TMJ, sternoclavicular, acromioclavicular, glenohumeral, lumbar spine, hip joints. Nobursitis.    JA Exam Details:  Axial Skeleton  (COIN) Sacroiliac tenderness:: No  (COIN) Positive ASHLEY test:: No  Upper Extremity  Elbow: R Swollen;R Tender;R Loss of Motion  Wrist: L Tender;L Loss of Motion  Thumb MCP: R Swollen  Index MCP: R Swollen  Middle MCP: R Swollen  Ring MCP: R Swollen  Little MCP: L Swollen  Lower Extremity  Knee: L Swollen  Ankle: (left PTT tenosynovitis)  Great MTP: R Swollen  Second MTP: L Swollen  Third MTP: R Swollen;L Swollen  Fourth MTP: R Swollen  Entheses  (COIN) Tender Entheses count: 0    Total active joints:  16   Total limited joints:  2  Tender entheses count:  0  SI Tenderness: No         Last Lab Results:     No visits with results within 1 Day(s) from this visit.   Latest known visit with results is:   Office Visit on 12/31/2019   Component Date Value     Rheumatoid Factor 12/31/2019 180*     WBC 12/31/2019 6.8      RBC Count 12/31/2019 4.47      Hemoglobin 12/31/2019 13.1      Hematocrit 12/31/2019 40.7      MCV 12/31/2019 91      MCH 12/31/2019 29.3      MCHC 12/31/2019 32.2      RDW 12/31/2019 12.4      Platelet Count 12/31/2019 285      Diff Method 12/31/2019 Automated Method      % Neutrophils 12/31/2019 67.0      % Lymphocytes 12/31/2019 22.8      % Monocytes 12/31/2019 7.0      % Eosinophils 12/31/2019 2.2      % Basophils 12/31/2019 0.7      % Immature Granulocytes 12/31/2019 0.3      Nucleated RBCs 12/31/2019 0      Absolute Neutrophil 12/31/2019 4.6      Absolute Lymphocytes 12/31/2019 1.6      Absolute Monocytes 12/31/2019 0.5      Absolute Eosinophils 12/31/2019 0.2       Absolute Basophils 12/31/2019 0.1      Abs Immature Granulocytes 12/31/2019 0.0      Absolute Nucleated RBC 12/31/2019 0.0      CRP Inflammation 12/31/2019 <2.9      Cyclic Citrullinated Pep* 12/31/2019 >340*     Sed Rate 12/31/2019 48*     IGG 12/31/2019 1,792*     KAYLEEN interpretation 12/31/2019 Borderline Positive*     KAYLEEN pattern 1 12/31/2019 SPECKLED      KAYLEEN titer 1 12/31/2019 1:40      RNP Antibody IgG 12/31/2019 0.6      Walsh EZ Antibody IgG 12/31/2019 <0.2      SSA (Ro) (EZ) Antibody,* 12/31/2019 <0.2      SSB (La) (EZ) Antibody,* 12/31/2019 <0.2      DNA-ds 12/31/2019 5             Assessment:     Rheumatoid arthritis with erosive disease and strongly positive rheumatoid factor and CCP antibodies.  She has been off combination therapy of Enbrel, methotrexate, and hydroxychloroquine and is having a more widespread flare than was appreciated by video visit just over a month ago.  We discussed the benefits of possible right elbow injection but it really will not address all the other issues that are coming up.  She had been relatively well controlled from a physical exam perspective on her previous combination therapy but still had persisting laboratory abnormalities and erosive damage so I think we really need to try something different.  She is really not ready to go back to doing the complex therapy that was tried previously either so just making a small switch is going to be difficult to implement.  I think her best bet would be to get her on 1 of the Jak inhibitors as it is a class of medications that is highly effective and provides the greatest simplicity as well.  The only thing I would view is even possibly simpler is Rituxan, but this would require restarting methotrexate which may be a difficult thing to accomplish.    Change Since Last Visit: Worse  (COIN) Provider Global Assessment Of Disease Activity: 6  (This is measured on the scale of 0 - 10)  Health counseling reviewed: infection,  medication side effect  Is patient on medication for the treatment of GENE: No    Treat to Target:   [id number] score: 18 (HIGHLY active disease).           Plan:     Orders Placed This Encounter   Procedures     FLU VAC PRESRV FREE QUAD SPLIT VIR 3+YRS IM     CBC with platelets differential     Creatinine     Hepatic panel     Vitamin D Deficiency     Hepatitis B Surface Antibody     Hepatitis C antibody     IgG     Rheumatoid factor     Cyclic Citrullinated Peptide Antibody IgG     Lipid Profile     1. Laboratory tests today to establish the extent of inflammation and make sure there are no contraindications to restarting therapy.  2. No imaging is needed today as it would not influence decisions; we already know that her arthritis is erosive and needs maximal therapy  3. Eye examinations for uveitis monitoring every 12 months are optional.  4. Physical activity as tolerated.  What one can do tells us how well someone is doing, and is healthy for individuals with arthritis.   5. Medications: As listed.  We will seek approval for Xeljanz 11 mg orally once daily.  6. Follow-up as needed within 2 months.  Since I will not be able to see her in 2021 will see about transitioning her to our adult rheumatology fellow Dr. Alison Lerman    If there are any new questions or concerns, I would be glad to help and can be reached through our main office at [phone] or our paging  at [phone].    This note was dictated and might contain unintended typographical errors missed in proofreading.  If there are questions/concerns, please contact the author.    Hardy Reese MD     CC  Patient Care Team:  Elieser Sena MD as PCP - General (Pediatrics)  Hardy Reese MD as MD (Pediatric Rheumatology)  Cornelius Leonardo MD as MD (Ophthalmology)  SELF, REFERRED    Copy to patient  Kaci Valadez Michael  [address]N[facility] [place] [address]

## 2020-10-08 NOTE — LETTER
October 10, 2020    Elieser Sena MD  [facility]  [address]  [place] [address]    Dear Dr. Sena,     I am writing to report final lab results from 10/8/2020 on your patient.     Patient: Geri Valadez  :    2000  MRN:      [id number]    The results include:    Resulted Orders   CBC with platelets differential   Result Value Ref Range    WBC 7.3 4.0 - 11.0 10e9/L    RBC Count 4.40 3.8 - 5.2 10e12/L    Hemoglobin 12.8 11.7 - 15.7 g/dL    Hematocrit 39.9 35.0 - 47.0 %    MCV 91 78 - 100 fl    MCH 29.1 26.5 - 33.0 pg    MCHC 32.1 31.5 - 36.5 g/dL    RDW 12.1 10.0 - 15.0 %    Platelet Count 347 150 - 450 10e9/L    Diff Method Automated Method     % Neutrophils 63.6 %    % Lymphocytes 27.0 %    % Monocytes 6.2 %    % Eosinophils 1.8 %    % Basophils 1.0 %    % Immature Granulocytes 0.4 %    Nucleated RBCs 0 0 /100    Absolute Neutrophil 4.6 1.6 - 8.3 10e9/L    Absolute Lymphocytes 2.0 0.8 - 5.3 10e9/L    Absolute Monocytes 0.5 0.0 - 1.3 10e9/L    Absolute Eosinophils 0.1 0.0 - 0.7 10e9/L    Absolute Basophils 0.1 0.0 - 0.2 10e9/L    Abs Immature Granulocytes 0.0 0 - 0.4 10e9/L    Absolute Nucleated RBC 0.0    Creatinine   Result Value Ref Range    Creatinine 0.55 0.52 - 1.04 mg/dL    GFR Estimate >90 >60 mL/min/[1.73_m2]      Comment:      Non  GFR Calc  Starting 2018, serum creatinine based estimated GFR (eGFR) will be   calculated using the Chronic Kidney Disease Epidemiology Collaboration   (CKD-EPI) equation.      GFR Estimate If Black >90 >60 mL/min/[1.73_m2]      Comment:       GFR Calc  Starting 2018, serum creatinine based estimated GFR (eGFR) will be   calculated using the Chronic Kidney Disease Epidemiology Collaboration   (CKD-EPI) equation.     Hepatic panel   Result Value Ref Range    Bilirubin Direct <0.1 0.0 - 0.2 mg/dL    Bilirubin Total 0.3 0.2 - 1.3 mg/dL    Albumin 3.7 3.4 - 5.0 g/dL    Protein Total 8.5 6.8 - 8.8  g/dL    Alkaline Phosphatase 65 40 - 150 U/L    ALT 16 0 - 50 U/L    AST 12 0 - 45 U/L   Vitamin D Deficiency   Result Value Ref Range    Vitamin D Deficiency screening 44 20 - 75 ug/L      Comment:      Season, race, dietary intake, and treatment affect the concentration of   25-hydroxy-Vitamin D. Values may decrease during winter months and increase   during summer months. Values 20-29 ug/L may indicate Vitamin D insufficiency   and values <20 ug/L may indicate Vitamin D deficiency.  Vitamin D determination is routinely performed by an immunoassay specific for   25 hydroxyvitamin D3.  If an individual is on vitamin D2 (ergocalciferol)   supplementation, please specify 25 OH vitamin D2 and D3 level determination by   LCMSMS test [id number].     Quantiferon-TB Gold Plus   Result Value Ref Range    MTB Quantiferon Result Negative NEG^Negative      Comment:      No interferon gamma response to M.tuberculosis antigens was detected.   Infection with M.tuberculosis is unlikely, however a single negative result   does not exclude infection. In patients at high risk for infection, a second   test should be considered      TB1 Ag minus Nil 0.03 IU/mL    TB2 Ag minus Nil 0.01 IU/mL    Mitogen minus Nil 5.37 IU/mL    NIL Result 0.04 IU/mL   Hepatitis B Surface Antibody   Result Value Ref Range    Hepatitis B Surface Antibody 0.00 <8.00 m[IU]/mL      Comment:      Nonreactive, No antibody detected when the value is less than 8.00 m[IU]/mL.   Hepatitis C antibody   Result Value Ref Range    Hepatitis C Antibody Nonreactive NR^Nonreactive      Comment:      Assay performance characteristics have not been established for newborns,   infants, and children     IgG   Result Value Ref Range    IGG 1,869 (H) 610 - 1,616 mg/dL   Rheumatoid factor   Result Value Ref Range    Rheumatoid Factor 58 (H) <12 IU/mL   Cyclic Citrullinated Peptide Antibody IgG   Result Value Ref Range    Cyclic Citrullinated Peptide Antibody, IgG >340 (H) <7 U/mL    Lipid Profile   Result Value Ref Range    Cholesterol 146 <200 mg/dL    Triglycerides 78 <150 mg/dL    HDL Cholesterol 55 >49 mg/dL    LDL Cholesterol Calculated 75 <100 mg/dL      Comment:      Desirable:       <100 mg/dl    Non HDL Cholesterol 91 <130 mg/dL     These results show immune activation and elevated autoantibodies, but are otherwise reassuring that it is safe to restart therapy.  Testing should be repeated in ~2 months.   Please feel free to contact me with any questions or concerns you might have.    Sincerely yours,    Hardy Reese MD      CC  Patient Care Team:  Elieser Sena MD as PCP - General (Pediatrics)  Hardy Reese MD as MD (Pediatric Rheumatology)  Cornelius Leonardo MD as MD (Ophthalmology)    Copy to patient  Geri THOMSON [facility]  [address] [address]

## 2020-10-08 NOTE — PATIENT INSTRUCTIONS
For Patient Education Materials:  z.[facility]/[place]       [facility] Pediatric Rheumatology    For Help:  The Pediatric Call Center at [phone] can help with scheduling of routine follow up visits.  Antonia Owens and Little Huddleston are the Nurse Coordinators for the Division of Pediatric Rheumatology and can be reached by phone at [phone] or through [employer] ([email or web address]). They can help with questions about your child s rheumatic condition, medications, and test results.  For emergencies after hours or on the weekends, please call the page  at [phone] and ask to speak to the physician on-call for Pediatric Rheumatology. Please do not use [employer] for urgent requests.  Main  Services:  [phone]  o Hmong/Malawian/Trung: [phone]  o Micronesian: [phone]  o Kiswahili: [phone]    Internal Referrals: If we refer your child to another physician/team within [facility], you should receive a call to set this up. If you do not hear anything within a week, please call the Call Center at [phone].    External Referrals: If we refer your child to a physician/team outside of [facility], our team will send the referral order and relevant records to them. We ask that you call the place where your child is being referred to ensure they received the needed information and notify our team coordinators if not.    Imaging: If your child needs an imaging study that is not being performed the day of your clinic appointment, please call to set this up. For xrays, ultrasounds, and echocardiogram call [phone]. For CT or MRI call [phone].     MyChart: We encourage you to sign up for [employer] at [email or web address]. For assistance or questions, call [phone]. If your child is 12 years or older, a consent for proxy/parent access needs to be signed so please discuss this with your physician at the next visit.

## 2020-10-08 NOTE — LETTER
10/8/2020      RE: Geri THOMSON Rory  [address]  [place] [address]           Rheumatology History:   Date of symptom onset: 2/14/2016  Date of first visit to center: 3/7/2016  Date of GENE diagnosis: 3/7/2016  ILAR category: polyarticular (RF-positive)  KAYLEEN Status: positive   RF Status: positive   CCP Status: positive   HLA-B27 Status: not done        Ophthalmology History:   Iritis/Uveitis Comorbidity: no   Date of last eye exam: 8/24/2020          Medications:   As of completion of this visit:  Current Outpatient Medications   Medication Sig Dispense Refill     tofacitinib (XELJANZ) 11 MG 24 hr tablet Take 1 tablet (11 mg) by mouth daily 30 tablet 3     Multiple Vitamins-Minerals (MULTIVITAMIN PO)        Omega-3 Fatty Acids (OMEGA 3 PO)        Date of last TB Screen: 10/8/2020         Allergies:   No Known Allergies        Problem list:     Patient Active Problem List    Diagnosis Date Noted     Cyclic citrullinated peptide (CCP) antibody positive 03/07/2016     Positive antinuclear antibody 03/07/2016     High total IgG 03/07/2016     Elevated rheumatoid factor 02/24/2016     Rheumatoid arthritis involving multiple sites with positive rheumatoid factor (H) 02/24/2016     Treat to target goal (8/16/2019):  low disease activity.                Subjective:   Geri is a 20 year old female who was seen for a Pediatric Rheumatology Clinic visit today.  Geri was last seen in our video clinic on 9/2/2020 and today is accompanied by her parents.  The primary encounter diagnosis was Rheumatoid arthritis involving multiple sites with positive rheumatoid factor (H). A diagnosis of Need for influenza vaccination was also pertinent to this visit.      Goals for the visit include follow-up because of persistent difficulty with the right elbow.  She would like to discuss injection of it (see notes from last time).  Her self-report scores are listed below.  She marks her right elbow as well as her right middle and ring  "fingers as having difficulty.  She is living back on campus in an apartment but much for school work is done online.  She does enjoy getting out occasionally for a walk and is able to do so relatively well.  She has not had known exposure or illness related to COVID-19.  Comprehensive Review of Systems is otherwise negative for any new health concerns according to our questionnaire..    Other information per our standardized questionnaire is as below:    Self Report  Patient Pain Status: 2  Patient Global Assessment of Disease Activity: 2     Patient Hightest Level of Education: some college or technical school     Arthritis History  Morning Stiffness in the past week: 15 minutes or less  Recent Back Pain: No    Has your arthritis stopped from trying any athletic or rigorous activities or interfaced with your ability to do these activities? No  Have you been limited your ability to do normal daily activities in the past week? No  Did you need help from other people to do normal activities in the past week? No  Have you used any aids or devices to help you do normal daily activities in the past week? No    Important Medical Events  Patient has experienced drug-related serious adverse events since last encounter?: No                 Examination:   Blood pressure 105/73, pulse 72, temperature 98.5  F (36.9  C), temperature source Tympanic, resp. rate 16, height 1.61 m (5' 3.39\"), weight 57 kg (125 lb 10.6 oz).  Facility age limit for growth percentiles is 20 years.  Growth percentile SmartLinks can only be used for patients less than 20 years old.  Body surface area is 1.6 meters squared.     Geri appears generally well and in good spirits.  Head: Normal head and hair.  Eyes: No scleral injection, pupils normal.  Ears: Normal external structures, tympanic membranes.  Nose: No cartilage deformity, congestion.  Mouth: Normal teeth, gums, tongue, mucosa.  Throat: Normal, without erythema or exudate.  Lungs: Normal " effort  Heart: Normal peripheral perfusion.  Skin: No inflammatory lesions.  Normal scratches and bruises.  Nails: No pitting, infection.  Neurological: Alert, appropriately interactive, normal cranial nerves, no deficits, transfers well.  Musculoskeletal: No evidence of current synovitis of the cervical spine, TMJ, sternoclavicular, acromioclavicular, glenohumeral, lumbar spine, hip joints. Nobursitis.    JA Exam Details:  Axial Skeleton  (COIN) Sacroiliac tenderness:: No  (COIN) Positive ASHLEY test:: No  Upper Extremity  Elbow: R Swollen;R Tender;R Loss of Motion  Wrist: L Tender;L Loss of Motion  Thumb MCP: R Swollen  Index MCP: R Swollen  Middle MCP: R Swollen  Ring MCP: R Swollen  Little MCP: L Swollen  Lower Extremity  Knee: L Swollen  Ankle: (left PTT tenosynovitis)  Great MTP: R Swollen  Second MTP: L Swollen  Third MTP: R Swollen;L Swollen  Fourth MTP: R Swollen  Entheses  (COIN) Tender Entheses count: 0    Total active joints:  16   Total limited joints:  2  Tender entheses count:  0  SI Tenderness: No         Last Lab Results:     No visits with results within 1 Day(s) from this visit.   Latest known visit with results is:   Office Visit on 12/31/2019   Component Date Value     Rheumatoid Factor 12/31/2019 180*     WBC 12/31/2019 6.8      RBC Count 12/31/2019 4.47      Hemoglobin 12/31/2019 13.1      Hematocrit 12/31/2019 40.7      MCV 12/31/2019 91      MCH 12/31/2019 29.3      MCHC 12/31/2019 32.2      RDW 12/31/2019 12.4      Platelet Count 12/31/2019 285      Diff Method 12/31/2019 Automated Method      % Neutrophils 12/31/2019 67.0      % Lymphocytes 12/31/2019 22.8      % Monocytes 12/31/2019 7.0      % Eosinophils 12/31/2019 2.2      % Basophils 12/31/2019 0.7      % Immature Granulocytes 12/31/2019 0.3      Nucleated RBCs 12/31/2019 0      Absolute Neutrophil 12/31/2019 4.6      Absolute Lymphocytes 12/31/2019 1.6      Absolute Monocytes 12/31/2019 0.5      Absolute Eosinophils 12/31/2019 0.2       Absolute Basophils 12/31/2019 0.1      Abs Immature Granulocytes 12/31/2019 0.0      Absolute Nucleated RBC 12/31/2019 0.0      CRP Inflammation 12/31/2019 <2.9      Cyclic Citrullinated Pep* 12/31/2019 >340*     Sed Rate 12/31/2019 48*     IGG 12/31/2019 1,792*     KAYLEEN interpretation 12/31/2019 Borderline Positive*     KAYLEEN pattern 1 12/31/2019 SPECKLED      KAYLEEN titer 1 12/31/2019 1:40      RNP Antibody IgG 12/31/2019 0.6      Walsh EZ Antibody IgG 12/31/2019 <0.2      SSA (Ro) (EZ) Antibody,* 12/31/2019 <0.2      SSB (La) (EZ) Antibody,* 12/31/2019 <0.2      DNA-ds 12/31/2019 5             Assessment:     Rheumatoid arthritis with erosive disease and strongly positive rheumatoid factor and CCP antibodies.  She has been off combination therapy of Enbrel, methotrexate, and hydroxychloroquine and is having a more widespread flare than was appreciated by video visit just over a month ago.  We discussed the benefits of possible right elbow injection but it really will not address all the other issues that are coming up.  She had been relatively well controlled from a physical exam perspective on her previous combination therapy but still had persisting laboratory abnormalities and erosive damage so I think we really need to try something different.  She is really not ready to go back to doing the complex therapy that was tried previously either so just making a small switch is going to be difficult to implement.  I think her best bet would be to get her on 1 of the Jak inhibitors as it is a class of medications that is highly effective and provides the greatest simplicity as well.  The only thing I would view is even possibly simpler is Rituxan, but this would require restarting methotrexate which may be a difficult thing to accomplish.    Change Since Last Visit: Worse  (COIN) Provider Global Assessment Of Disease Activity: 6  (This is measured on the scale of 0 - 10)  Health counseling reviewed: infection,  medication side effect  Is patient on medication for the treatment of GENE: No    Treat to Target:   [id number] score: 18 (HIGHLY active disease).           Plan:     Orders Placed This Encounter   Procedures     FLU VAC PRESRV FREE QUAD SPLIT VIR 3+YRS IM     CBC with platelets differential     Creatinine     Hepatic panel     Vitamin D Deficiency     Hepatitis B Surface Antibody     Hepatitis C antibody     IgG     Rheumatoid factor     Cyclic Citrullinated Peptide Antibody IgG     Lipid Profile     1. Laboratory tests today to establish the extent of inflammation and make sure there are no contraindications to restarting therapy.  2. No imaging is needed today as it would not influence decisions; we already know that her arthritis is erosive and needs maximal therapy  3. Eye examinations for uveitis monitoring every 12 months are optional.  4. Physical activity as tolerated.  What one can do tells us how well someone is doing, and is healthy for individuals with arthritis.   5. Medications: As listed.  We will seek approval for Xeljanz 11 mg orally once daily.  6. Follow-up as needed within 2 months.  Since I will not be able to see her in 2021 will see about transitioning her to our adult rheumatology fellow Dr. Alison Lerman    If there are any new questions or concerns, I would be glad to help and can be reached through our main office at [phone] or our paging  at [phone].    This note was dictated and might contain unintended typographical errors missed in proofreading.  If there are questions/concerns, please contact the author.    Hardy Reese MD     CC  Patient Care Team:  Elieser Sena MD as PCP - General (Pediatrics)  Hardy Reese MD as MD (Pediatric Rheumatology)  Cornelius Leonardo MD as MD (Ophthalmology)  SELF, REFERRED    Copy to patient  Parent(s) of Geri [facility]  [address] [address]

## 2020-10-08 NOTE — TELEPHONE ENCOUNTER
Prior Authorization Approval    Authorization Effective Date: 9/8/2020  Authorization Expiration Date: 1/6/2021  Medication: Xeljanz PA approved  Approved Dose/Quantity: 30 per 30  Reference #: [id number]   Insurance Company:    Expected CoPay:       CoPay Card Available:      Foundation Assistance Needed:    Which [facility] needed for [facility] [address]  Pharmacy Notified: Yes  Patient Notified: Yes

## 2020-10-08 NOTE — PROGRESS NOTES
Rheumatology History:   Date of symptom onset: 2/14/2016  Date of first visit to center: 3/7/2016  Date of GENE diagnosis: 3/7/2016  ILAR category: polyarticular (RF-positive)  KAYLEEN Status: positive   RF Status: positive   CCP Status: positive   HLA-B27 Status: not done        Ophthalmology History:   Iritis/Uveitis Comorbidity: no   Date of last eye exam: 8/24/2020          Medications:   As of completion of this visit:  Current Outpatient Medications   Medication Sig Dispense Refill     tofacitinib (XELJANZ) 11 MG 24 hr tablet Take 1 tablet (11 mg) by mouth daily 30 tablet 3     Multiple Vitamins-Minerals (MULTIVITAMIN PO)        Omega-3 Fatty Acids (OMEGA 3 PO)        Date of last TB Screen: 10/8/2020         Allergies:   No Known Allergies        Problem list:     Patient Active Problem List    Diagnosis Date Noted     Cyclic citrullinated peptide (CCP) antibody positive 03/07/2016     Positive antinuclear antibody 03/07/2016     High total IgG 03/07/2016     Elevated rheumatoid factor 02/24/2016     Rheumatoid arthritis involving multiple sites with positive rheumatoid factor (H) 02/24/2016     Treat to target goal (8/16/2019):  low disease activity.                Subjective:   Geri is a 20 year old female who was seen for a Pediatric Rheumatology Clinic visit today.  Geri was last seen in our video clinic on 9/2/2020 and today is accompanied by her parents.  The primary encounter diagnosis was Rheumatoid arthritis involving multiple sites with positive rheumatoid factor (H). A diagnosis of Need for influenza vaccination was also pertinent to this visit.      Goals for the visit include follow-up because of persistent difficulty with the right elbow.  She would like to discuss injection of it (see notes from last time).  Her self-report scores are listed below.  She marks her right elbow as well as her right middle and ring fingers as having difficulty.  She is living back on campus in an apartment but  "much for school work is done online.  She does enjoy getting out occasionally for a walk and is able to do so relatively well.  She has not had known exposure or illness related to COVID-19.  Comprehensive Review of Systems is otherwise negative for any new health concerns according to our questionnaire..    Other information per our standardized questionnaire is as below:    Self Report  Patient Pain Status: 2  Patient Global Assessment of Disease Activity: 2     Patient Hightest Level of Education: some college or technical school     Arthritis History  Morning Stiffness in the past week: 15 minutes or less  Recent Back Pain: No    Has your arthritis stopped from trying any athletic or rigorous activities or interfaced with your ability to do these activities? No  Have you been limited your ability to do normal daily activities in the past week? No  Did you need help from other people to do normal activities in the past week? No  Have you used any aids or devices to help you do normal daily activities in the past week? No    Important Medical Events  Patient has experienced drug-related serious adverse events since last encounter?: No                 Examination:   Blood pressure 105/73, pulse 72, temperature 98.5  F (36.9  C), temperature source Tympanic, resp. rate 16, height 1.61 m (5' 3.39\"), weight 57 kg (125 lb 10.6 oz).  Facility age limit for growth percentiles is 20 years.  Growth percentile SmartLinks can only be used for patients less than 20 years old.  Body surface area is 1.6 meters squared.     Geri appears generally well and in good spirits.  Head: Normal head and hair.  Eyes: No scleral injection, pupils normal.  Ears: Normal external structures, tympanic membranes.  Nose: No cartilage deformity, congestion.  Mouth: Normal teeth, gums, tongue, mucosa.  Throat: Normal, without erythema or exudate.  Lungs: Normal effort  Heart: Normal peripheral perfusion.  Skin: No inflammatory lesions.  Normal " scratches and bruises.  Nails: No pitting, infection.  Neurological: Alert, appropriately interactive, normal cranial nerves, no deficits, transfers well.  Musculoskeletal: No evidence of current synovitis of the cervical spine, TMJ, sternoclavicular, acromioclavicular, glenohumeral, lumbar spine, hip joints. Nobursitis.    JA Exam Details:  Axial Skeleton  (COIN) Sacroiliac tenderness:: No  (COIN) Positive ASHLEY test:: No  Upper Extremity  Elbow: R Swollen;R Tender;R Loss of Motion  Wrist: L Tender;L Loss of Motion  Thumb MCP: R Swollen  Index MCP: R Swollen  Middle MCP: R Swollen  Ring MCP: R Swollen  Little MCP: L Swollen  Lower Extremity  Knee: L Swollen  Ankle: (left PTT tenosynovitis)  Great MTP: R Swollen  Second MTP: L Swollen  Third MTP: R Swollen;L Swollen  Fourth MTP: R Swollen  Entheses  (COIN) Tender Entheses count: 0    Total active joints:  16   Total limited joints:  2  Tender entheses count:  0  SI Tenderness: No         Last Lab Results:     No visits with results within 1 Day(s) from this visit.   Latest known visit with results is:   Office Visit on 12/31/2019   Component Date Value     Rheumatoid Factor 12/31/2019 180*     WBC 12/31/2019 6.8      RBC Count 12/31/2019 4.47      Hemoglobin 12/31/2019 13.1      Hematocrit 12/31/2019 40.7      MCV 12/31/2019 91      MCH 12/31/2019 29.3      MCHC 12/31/2019 32.2      RDW 12/31/2019 12.4      Platelet Count 12/31/2019 285      Diff Method 12/31/2019 Automated Method      % Neutrophils 12/31/2019 67.0      % Lymphocytes 12/31/2019 22.8      % Monocytes 12/31/2019 7.0      % Eosinophils 12/31/2019 2.2      % Basophils 12/31/2019 0.7      % Immature Granulocytes 12/31/2019 0.3      Nucleated RBCs 12/31/2019 0      Absolute Neutrophil 12/31/2019 4.6      Absolute Lymphocytes 12/31/2019 1.6      Absolute Monocytes 12/31/2019 0.5      Absolute Eosinophils 12/31/2019 0.2      Absolute Basophils 12/31/2019 0.1      Abs Immature Granulocytes 12/31/2019 0.0       Absolute Nucleated RBC 12/31/2019 0.0      CRP Inflammation 12/31/2019 <2.9      Cyclic Citrullinated Pep* 12/31/2019 >340*     Sed Rate 12/31/2019 48*     IGG 12/31/2019 1,792*     KAYLEEN interpretation 12/31/2019 Borderline Positive*     KAYLEEN pattern 1 12/31/2019 SPECKLED      KAYLEEN titer 1 12/31/2019 1:40      RNP Antibody IgG 12/31/2019 0.6      Walsh EZ Antibody IgG 12/31/2019 <0.2      SSA (Ro) (EZ) Antibody,* 12/31/2019 <0.2      SSB (La) (EZ) Antibody,* 12/31/2019 <0.2      DNA-ds 12/31/2019 5             Assessment:     Rheumatoid arthritis with erosive disease and strongly positive rheumatoid factor and CCP antibodies.  She has been off combination therapy of Enbrel, methotrexate, and hydroxychloroquine and is having a more widespread flare than was appreciated by video visit just over a month ago.  We discussed the benefits of possible right elbow injection but it really will not address all the other issues that are coming up.  She had been relatively well controlled from a physical exam perspective on her previous combination therapy but still had persisting laboratory abnormalities and erosive damage so I think we really need to try something different.  She is really not ready to go back to doing the complex therapy that was tried previously either so just making a small switch is going to be difficult to implement.  I think her best bet would be to get her on 1 of the Jak inhibitors as it is a class of medications that is highly effective and provides the greatest simplicity as well.  The only thing I would view is even possibly simpler is Rituxan, but this would require restarting methotrexate which may be a difficult thing to accomplish.    Change Since Last Visit: Worse  (COIN) Provider Global Assessment Of Disease Activity: 6  (This is measured on the scale of 0 - 10)  Health counseling reviewed: infection, medication side effect  Is patient on medication for the treatment of GENE: No    Treat to  Target:   [id number] score: 18 (HIGHLY active disease).           Plan:     Orders Placed This Encounter   Procedures     FLU VAC PRESRV FREE QUAD SPLIT VIR 3+YRS IM     CBC with platelets differential     Creatinine     Hepatic panel     Vitamin D Deficiency     Hepatitis B Surface Antibody     Hepatitis C antibody     IgG     Rheumatoid factor     Cyclic Citrullinated Peptide Antibody IgG     Lipid Profile     1. Laboratory tests today to establish the extent of inflammation and make sure there are no contraindications to restarting therapy.  2. No imaging is needed today as it would not influence decisions; we already know that her arthritis is erosive and needs maximal therapy  3. Eye examinations for uveitis monitoring every 12 months are optional.  4. Physical activity as tolerated.  What one can do tells us how well someone is doing, and is healthy for individuals with arthritis.   5. Medications: As listed.  We will seek approval for Xeljanz 11 mg orally once daily.  6. Follow-up as needed within 2 months.  Since I will not be able to see her in 2021 will see about transitioning her to our adult rheumatology fellow Dr. Alison Lerman    If there are any new questions or concerns, I would be glad to help and can be reached through our main office at [phone] or our paging  at [phone].    This note was dictated and might contain unintended typographical errors missed in proofreading.  If there are questions/concerns, please contact the author.    Hardy Reese MD     CC  Patient Care Team:  Elieser Sena MD as PCP - General (Pediatrics)  Hardy Reese MD as MD (Pediatric Rheumatology)  Cornelius Leonardo MD as MD (Ophthalmology)  SELF, REFERRED    Copy to patient  RoryKaci tinajero Delbert Valadez  [address] [facility] [address] [place] [address]

## 2020-10-09 LAB
CCP AB SER IA-ACNC: >340 U/ML
GAMMA INTERFERON BACKGROUND BLD IA-ACNC: 0.04 IU/ML
IGG SERPL-MCNC: 1869 MG/DL (ref 610–1616)
M TB IFN-G CD4+ BCKGRND COR BLD-ACNC: 5.37 IU/ML
M TB TUBERC IFN-G BLD QL: NEGATIVE
MITOGEN IGNF BCKGRD COR BLD-ACNC: 0.01 IU/ML
MITOGEN IGNF BCKGRD COR BLD-ACNC: 0.03 IU/ML
RHEUMATOID FACT SER NEPH-ACNC: 58 IU/ML (ref 0–20)

## 2020-10-12 NOTE — NURSING NOTE
Peds Outpatient BP  1) Rested for 5 minutes, BP taken on bare arm, patient sitting (or supine for infants) w/ legs uncrossed?   Yes  2) Right arm used?  Right arm   Yes  3) Arm circumference of largest part of upper arm (in cm): 28  4) BP cuff sized used: Adult (25-32cm)   If used different size cuff then what was recommended why? N/A  5) Machine BP reading:   BP Readings from Last 1 Encounters:   10/08/20 105/73      Patient age 20 years.  6) Manual BP reading: N/A  7) Other comments: None        Amberly Quinn M.A.

## 2020-11-16 ENCOUNTER — HEALTH MAINTENANCE LETTER (OUTPATIENT)
Age: 20
End: 2020-11-16

## 2020-12-17 ENCOUNTER — VIRTUAL VISIT (OUTPATIENT)
Dept: RHEUMATOLOGY | Facility: CLINIC | Age: 20
End: 2020-12-17
Attending: STUDENT IN AN ORGANIZED HEALTH CARE EDUCATION/TRAINING PROGRAM
Payer: COMMERCIAL

## 2020-12-17 DIAGNOSIS — M05.79 RHEUMATOID ARTHRITIS INVOLVING MULTIPLE SITES WITH POSITIVE RHEUMATOID FACTOR (H): Primary | Chronic | ICD-10-CM

## 2020-12-17 DIAGNOSIS — Z51.81 THERAPEUTIC DRUG MONITORING: ICD-10-CM

## 2020-12-17 PROCEDURE — 99213 OFFICE O/P EST LOW 20 MIN: CPT | Mod: GT | Performed by: STUDENT IN AN ORGANIZED HEALTH CARE EDUCATION/TRAINING PROGRAM

## 2020-12-17 NOTE — PATIENT INSTRUCTIONS
Geri THOMSON [facility] saw Dr. Alison Lerman and Dr. Hardy Reese on December 17, 2020 for a follow up visit.    Recommendations:  1. Labs ordered today, to be obtained within next few weeks per patient's convenience.  2. Continue Xeljanz as prescribed.  3. Next visit with Dr. Lerman in two months      Results: Geri's lab and/or imaging results (if performed) will be mailed to you and your doctor in a formal letter summarizing this visit.  Any pending results at the time of the original note will be sent in a separate letter or relayed by phone.      Outside lab results: If you have labs done at an outside clinic as part of your follow up, please have the results faxed to us at [phone].    Thank you for allowing me to participate in Geri's care.  If there are any questions or concerns, please do not hesitate to contact us at the phone numbers below.    Alison M. Lerman, MD  Adult and Pediatric Rheumatology Fellow, PGY5    Pediatric Rheumatology Contact Information  [phone] - Nurse line: for medical questions about symptoms and medications   [phone] - Main office: for scheduling needs, refills, records requests  [phone] - Paging : for urgent after-hours needs      For Patient Education Materials:  [email or web address]

## 2020-12-17 NOTE — PROGRESS NOTES
Rheumatology History:   Date of symptom onset: 2/14/2016  Date of first visit to center: 3/7/2016  Date of GENE diagnosis: 3/7/2016  ILAR category: polyarticular (RF-positive)  KAYLEEN Status: positive   RF Status: positive   CCP Status: positive   HLA-B27 Status: not done        Ophthalmology History:   Iritis/Uveitis Comorbidity: no   Date of last eye exam: 8/24/2020          Medications:   As of completion of this visit:  Current Outpatient Medications   Medication Sig Dispense Refill     Multiple Vitamins-Minerals (MULTIVITAMIN PO)        Omega-3 Fatty Acids (OMEGA 3 PO)        tofacitinib (XELJANZ) 11 MG 24 hr tablet Take 1 tablet (11 mg) by mouth daily 30 tablet 3     Date of last TB Screen: 10/8/2020         Allergies:   No Known Allergies        Problem list:     Patient Active Problem List    Diagnosis Date Noted     Cyclic citrullinated peptide (CCP) antibody positive 03/07/2016     Positive antinuclear antibody 03/07/2016     High total IgG 03/07/2016     Elevated rheumatoid factor 02/24/2016     Rheumatoid arthritis involving multiple sites with positive rheumatoid factor (H) 02/24/2016     Treat to target goal (8/16/2019):  low disease activity.                Subjective:   Geri is a 20 year old female who was seen in virtual Pediatric Rheumatology clinic today for a video follow up visit.  Geri was last seen in our clinic via video visit 10/8/2020.  The primary encounter diagnosis was Rheumatoid arthritis involving multiple sites with positive rheumatoid factor (H). A diagnosis of Therapeutic drug monitoring was also pertinent to this visit.      Prescribed medications have been administered regularly, without missed doses.  Medications have been tolerated well, without side effects.     Since Geri was last seen she has been doing quite well. Due to increase in joint pain over the late summer and fall, Geri was started on Xeljanz 10/8/2020. Since starting this medication, she has noticed  improvement in her right elbow and fingers which previously were sore. She is not sure if her right elbow is all the way back to normal, but it does not hurt and she does not feel functionally limited by it. She has not noticed any more finger swelling or pain which had been bothering her at the last two visits in 9/2020 and 10/2020. She has no wrist, shoulder, knee, ankle or foot pain. She does not have any stiffness in the morning when she wakes up. She does not feel limited in any physical activities. Granted, at the moment she is relatively inactive due to online course work and her upcoming final exams.      She has not had any significant illnesses. She has noted no bothersome side effects from taking the Xeljanz. She prefers this mode of therapy over injections.        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  All coursework is currently online due to the pandemic. [place]     Comprehensive Review of Systems is negative for any new health concerns.    Information per our standardized questionnaire is as below:    Self Report  Patient Pain Status: 0  Patient Global Assessment of Disease Activity: 8     Patient Hightest Level of Education: some college or technical school     Arthritis History  Morning Stiffness in the past week: no stiffness  Recent Back Pain: No    Has your arthritis stopped from trying any athletic or rigorous activities or interfaced with your ability to do these activities? No  Have you been limited your ability to do normal daily activities in the past week? No  Did you need help from other people to do normal activities in the past week? No  Have you used any aids or devices to help you do normal daily activities in the past week? No    Important Medical Events  Patient has experienced drug-related serious adverse events since last encounter?: No         Video Visit Time  [place]:   Start: 12/17/2020 12:32 pm   Stop: 12/17/2020 01:05 pm          Examination:   There were no vitals taken for this visit.  Facility age limit for growth percentiles is 20 years.  Growth percentile SmartLinks can only be used for patients less than 20 years old.  There is no height or weight on file to calculate BSA.          Examination:   The exam below was performed via [employer] Virtual Visit.     Gen: Well appearing; cooperative. No acute distress.  Head: Normal head and hair.  Eyes: No scleral injection, extraocular movements intact.   Mouth: No jaqueline-oral lesions. Visible teeth appear normal.   Skin/Hair: No rashes or lesions noted on visualized skin. Hair pattern on scalp appears normal.  Resp: No increased work of breathing on room air at rest or with exam maneuvers. No cough or wheeze appreciated.   CV: No diaphoresis, no cyanosis. No lower extremity edema.   Neuro: Alert, interactive. Answers questions appropriately. Speech fluent. CN appear grossly normal. Strength and tone appears grossly normal.   MSK: Full active range of motion within the following areas: cervical spine, TMJ, sternoclavicular, acromioclavicular, glenohumeral, wrists, finger, sacroiliac, hip, knee, ankle, or toe joints. No joint swelling appreciated. Normal gait.     The right elbow will straighten to 180 degrees while left elbow will hyperextend an additional 5 degrees    Right elbow flexion is limited (unable to touch thumb to shoulder) while left elbow flexion is full (able to touch thumb to shoulder)    Possible loss of clear bony landmarks to lateral right ankle but no tenderness to palpation per report.      Total active joints:  1   Total limited joints:  1  Tender entheses count:  0         Last Lab Results:     No visits with results within 1 Day(s) from this visit.   Latest known visit with results is:   Office Visit on 10/08/2020   Component Date Value     WBC 10/08/2020 7.3      RBC Count 10/08/2020 4.40       Hemoglobin 10/08/2020 12.8      Hematocrit 10/08/2020 39.9      MCV 10/08/2020 91      MCH 10/08/2020 29.1      MCHC 10/08/2020 32.1      RDW 10/08/2020 12.1      Platelet Count 10/08/2020 347      Diff Method 10/08/2020 Automated Method      % Neutrophils 10/08/2020 63.6      % Lymphocytes 10/08/2020 27.0      % Monocytes 10/08/2020 6.2      % Eosinophils 10/08/2020 1.8      % Basophils 10/08/2020 1.0      % Immature Granulocytes 10/08/2020 0.4      Nucleated RBCs 10/08/2020 0      Absolute Neutrophil 10/08/2020 4.6      Absolute Lymphocytes 10/08/2020 2.0      Absolute Monocytes 10/08/2020 0.5      Absolute Eosinophils 10/08/2020 0.1      Absolute Basophils 10/08/2020 0.1      Abs Immature Granulocytes 10/08/2020 0.0      Absolute Nucleated RBC 10/08/2020 0.0      Creatinine 10/08/2020 0.55      GFR Estimate 10/08/2020 >90      GFR Estimate If Black 10/08/2020 >90      Bilirubin Direct 10/08/2020 <0.1      Bilirubin Total 10/08/2020 0.3      Albumin 10/08/2020 3.7      Protein Total 10/08/2020 8.5      Alkaline Phosphatase 10/08/2020 65      ALT 10/08/2020 16      AST 10/08/2020 12      Vitamin D Deficiency scr* 10/08/2020 44      MTB Quantiferon Result 10/08/2020 Negative      TB1 Ag minus Nil 10/08/2020 0.03      TB2 Ag minus Nil 10/08/2020 0.01      Mitogen minus Nil 10/08/2020 5.37      NIL Result 10/08/2020 0.04      Hepatitis B Surface Anti* 10/08/2020 0.00      Hepatitis C Antibody 10/08/2020 Nonreactive      IGG 10/08/2020 1,869*     Rheumatoid Factor 10/08/2020 58*     Cyclic Citrullinated Pep* 10/08/2020 >340*     Cholesterol 10/08/2020 146      Triglycerides 10/08/2020 78      HDL Cholesterol 10/08/2020 55      LDL Cholesterol Calculat* 10/08/2020 75      Non HDL Cholesterol 10/08/2020 91             Assessment:   Geri is a 20 year old female with rheumatoid arthritis with erosive disease and strongly positive rheumatoid factor and CCP antibodies.  She was started on Xeljanz therapy 10/8/2020 with  good improvement in joint symptoms including right elbow pain and pain and swelling of several digits on both hands noted at last visit. When she began to flare, she had been off her previous combination therapy of Enbrel, methotrexate, and hydroxychloroquine. Per previous documentation from Dr. Reese, she had been relatively well controlled from a physical exam perspective on her previous combination therapy but still had persisting laboratory abnormalities and erosive damage.  Therefore when her joints flared this fall a different therapy modality was chosen in the form of the JAK inhibitor Xeljanz. She is happy with the once a day oral dosing regimen, has not noted any side effects, and does feel that her joint symptoms have improved. Minor loss of full flexion and extension of the right elbow was observed on today's exam without swelling or tenderness. Overall, this seems to be a positive trajectory and will plan to continue the current plan of care with close follow-up.       ACR Functional Class: Normal     (This is measured on the scale of 0 - 10)  (COIN) On Medication For Treatment Of GENE?: Yes        Health counseling reviewed: exercise, infection, treatment adherence  Provider assessment of disease activity: 1  Is patient on medication for the treatment of GENE: Yes    Treat to Target:   [id number] score: 10  Treatment target set: Yes   Treatment target: inactive disease   Disease activity: not at target   Physical function: not at target   Use of algorithm: No          Plan:   1. Laboratory testing ordered today, to be done in next few weeks. If stable, will then plan for every 3 months to monitor medications and disease activity.    2. No imaging is needed today.   3. No new referrals made today.  4. Medications: As listed. Changes made today: none.  5. Continue eye exam monitoring every 12 months.   Return in about 3 months (around 3/17/2021) for Follow up, with me, in clinic.    If there are any new  questions or concerns, I would be glad to help and can be reached through our main office at [phone] or our paging  at [phone].    Alison M. Lerman, MD  Adult and Pediatric Rheumatology Fellow    I was present for the entire video visit, including the discussion and examination, and reviewed and edited the fellow's note and agree with the plan of care.  Leoncio Nieto M.D.   Professor of Pediatrics    Pediatric Rheumatology            Addendum:  Laboratory Investigations:   Laboratory investigations ordered today but will be done within next few weeks. These results will be reported in a separate letter.    CC  Patient Care Team:  Elieser Sena MD as PCP - General (Pediatrics)  Leoncio Nieto MD as MD (Pediatric Rheumatology)  Cornelius Leonardo MD as MD (Ophthalmology)  Leoncio Nieto MD as Assigned PCP  LEONCIO NIETO    Copy to patient  RoryKaci Delbert Valadez  [address] [facility] [address] [facility] [address]

## 2020-12-17 NOTE — PROGRESS NOTES
Rheumatology History:   Date of symptom onset: 2/14/2016  Date of first visit to center: 3/7/2016  Date of GENE diagnosis: 3/7/2016  ILAR category: polyarticular (RF-positive)  KAYLEEN Status: positive   RF Status: positive   CCP Status: positive   HLA-B27 Status: not done        Ophthalmology History:   Iritis/Uveitis Comorbidity: no   Date of last eye exam:       No morning stiffness.  No activity limitation per patient. No pain at any time.   No swelling.     Xeljanz          Medications:   As of completion of this visit:  Current Outpatient Medications   Medication Sig Dispense Refill     Multiple Vitamins-Minerals (MULTIVITAMIN PO)        Omega-3 Fatty Acids (OMEGA 3 PO)        tofacitinib (XELJANZ) 11 MG 24 hr tablet Take 1 tablet (11 mg) by mouth daily 30 tablet 3     Date of last TB Screen: 10/8/2020         Allergies:   No Known Allergies        Problem list:     Patient Active Problem List    Diagnosis Date Noted     Cyclic citrullinated peptide (CCP) antibody positive 03/07/2016     Priority: Medium     Positive antinuclear antibody 03/07/2016     Priority: Medium     High total IgG 03/07/2016     Priority: Medium     Elevated rheumatoid factor 02/24/2016     Priority: Medium     Rheumatoid arthritis involving multiple sites with positive rheumatoid factor (H) 02/24/2016     Priority: Medium     Treat to target goal (8/16/2019):  low disease activity.                Subjective:   Geri is a 20 year old *** who was seen in Pediatric Rheumatology clinic today for follow up.  Geri was last seen in our clinic on 10/8/2020 and returns today accompanied by ***.  There were no encounter diagnoses.      Goals for the visit include ***.    Prescribed medications have been administered regularly, without missed doses.  Medications have been tolerated well, without side effects.    ***    *** Comprehensive Review of Systems is otherwise negative.    Information per our standardized questionnaire is as  below:    Self Report           Patient Hightest Level of Education: some college or technical school     Arthritis History                        Important Medical Events                    Examination:   There were no vitals taken for this visit.  Facility age limit for growth percentiles is 20 years.  Growth percentile SmartLinks can only be used for patients less than 20 years old.  There is no height or weight on file to calculate BSA.     ***    JA Exam Details:  Axial Skeleton     Upper Extremity     Lower Extremity     Entheses       Positive ASHLEY test:     Modified Schober's (yes/no, cm):   ,      Total active joints:      Total limited joints:     Tender entheses count:     SI Tenderness:           Last Lab Results:     No visits with results within 1 Day(s) from this visit.   Latest known visit with results is:   Office Visit on 10/08/2020   Component Date Value     WBC 10/08/2020 7.3      RBC Count 10/08/2020 4.40      Hemoglobin 10/08/2020 12.8      Hematocrit 10/08/2020 39.9      MCV 10/08/2020 91      MCH 10/08/2020 29.1      MCHC 10/08/2020 32.1      RDW 10/08/2020 12.1      Platelet Count 10/08/2020 347      Diff Method 10/08/2020 Automated Method      % Neutrophils 10/08/2020 63.6      % Lymphocytes 10/08/2020 27.0      % Monocytes 10/08/2020 6.2      % Eosinophils 10/08/2020 1.8      % Basophils 10/08/2020 1.0      % Immature Granulocytes 10/08/2020 0.4      Nucleated RBCs 10/08/2020 0      Absolute Neutrophil 10/08/2020 4.6      Absolute Lymphocytes 10/08/2020 2.0      Absolute Monocytes 10/08/2020 0.5      Absolute Eosinophils 10/08/2020 0.1      Absolute Basophils 10/08/2020 0.1      Abs Immature Granulocytes 10/08/2020 0.0      Absolute Nucleated RBC 10/08/2020 0.0      Creatinine 10/08/2020 0.55      GFR Estimate 10/08/2020 >90      GFR Estimate If Black 10/08/2020 >90      Bilirubin Direct 10/08/2020 <0.1      Bilirubin Total 10/08/2020 0.3      Albumin 10/08/2020 3.7      Protein Total  10/08/2020 8.5      Alkaline Phosphatase 10/08/2020 65      ALT 10/08/2020 16      AST 10/08/2020 12      Vitamin D Deficiency scr* 10/08/2020 44      MTB Quantiferon Result 10/08/2020 Negative      TB1 Ag minus Nil 10/08/2020 0.03      TB2 Ag minus Nil 10/08/2020 0.01      Mitogen minus Nil 10/08/2020 5.37      NIL Result 10/08/2020 0.04      Hepatitis B Surface Anti* 10/08/2020 0.00      Hepatitis C Antibody 10/08/2020 Nonreactive      IGG 10/08/2020 1,869*     Rheumatoid Factor 10/08/2020 58*     Cyclic Citrullinated Pep* 10/08/2020 >340*     Cholesterol 10/08/2020 146      Triglycerides 10/08/2020 78      HDL Cholesterol 10/08/2020 55      LDL Cholesterol Calculat* 10/08/2020 75      Non HDL Cholesterol 10/08/2020 91             Assessment:   ***             (This is measured on the scale of 0 - 10)           Health counseling reviewed:    Provider assessment of disease activity:    Is patient on medication for the treatment of GENE:      Treat to Target:   [id number] score:    Treatment target set:     Treatment target:     Disease activity:     Physical function:     Use of algorithm:            Plan:   1. Laboratory testing every *** months, to monitor medications and disease activity.    2. No planned labs prior to next visit.  3. No imaging is needed today.   4. No new referrals made today.  5. Medications: As listed. Changes made today: ***.  6. Continue eye exam monitoring every *** months.   7. No follow-ups on file.    If there are any new questions or concerns, I would be glad to help and can be reached through our main office at [phone] or our paging  at [phone].    Alison M. Lerman, MD         Addendum:  Imaging Results:     Results for orders placed or performed during the hospital encounter of 03/18/19   XR Foot Bilateral 1 View    Narrative    Exam: XR FOOT BILATERAL 1 VW  3/18/2019 11:22 AM      History: Evaluate for erosions; Rheumatoid arthritis involving  multiple sites with  positive rheumatoid factor (H)    Comparison: 3/29/2017 3/7/2016    Findings: AP view of the feet. Patient is skeletally mature. As seen  on the prior exam there are small periarticular erosions about the  right fifth metatarsal head. Defect along the medial aspect of the  first digit proximal phalanx is unchanged compared to 2016. No joint  space narrowing appreciated and no additional erosive change is  identified. Focal lucency in the distal aspect of the left fifth  metatarsal head is unchanged. No substantial soft tissue swelling.      Impression    Impression:   1. Continued erosive change of the fifth metatarsal head with stable  focal presumed erosion of the first digit proximal phalanx.  2. No new osseous abnormality.    VIPUL ROCA MD             Addendum:  Laboratory Investigations:   Laboratory investigations performed today for which results were available at the time of this note are listed below.  Pending labs will be reported in a separate letter.  No visits with results within 1 Day(s) from this visit.   Latest known visit with results is:   Office Visit on 10/08/2020   Component Date Value Ref Range Status     WBC 10/08/2020 7.3  4.0 - 11.0 10e9/L Final     RBC Count 10/08/2020 4.40  3.8 - 5.2 10e12/L Final     Hemoglobin 10/08/2020 12.8  11.7 - 15.7 g/dL Final     Hematocrit 10/08/2020 39.9  35.0 - 47.0 % Final     MCV 10/08/2020 91  78 - 100 fl Final     MCH 10/08/2020 29.1  26.5 - 33.0 pg Final     MCHC 10/08/2020 32.1  31.5 - 36.5 g/dL Final     RDW 10/08/2020 12.1  10.0 - 15.0 % Final     Platelet Count 10/08/2020 347  150 - 450 10e9/L Final     Diff Method 10/08/2020 Automated Method   Final     % Neutrophils 10/08/2020 63.6  % Final     % Lymphocytes 10/08/2020 27.0  % Final     % Monocytes 10/08/2020 6.2  % Final     % Eosinophils 10/08/2020 1.8  % Final     % Basophils 10/08/2020 1.0  % Final     % Immature Granulocytes 10/08/2020 0.4  % Final     Nucleated RBCs 10/08/2020 0  0 /100  Final     Absolute Neutrophil 10/08/2020 4.6  1.6 - 8.3 10e9/L Final     Absolute Lymphocytes 10/08/2020 2.0  0.8 - 5.3 10e9/L Final     Absolute Monocytes 10/08/2020 0.5  0.0 - 1.3 10e9/L Final     Absolute Eosinophils 10/08/2020 0.1  0.0 - 0.7 10e9/L Final     Absolute Basophils 10/08/2020 0.1  0.0 - 0.2 10e9/L Final     Abs Immature Granulocytes 10/08/2020 0.0  0 - 0.4 10e9/L Final     Absolute Nucleated RBC 10/08/2020 0.0   Final     Creatinine 10/08/2020 0.55  0.52 - 1.04 mg/dL Final     GFR Estimate 10/08/2020 >90  >60 mL/min/[1.73_m2] Final    Comment: Non  GFR Calc  Starting 12/18/2018, serum creatinine based estimated GFR (eGFR) will be   calculated using the Chronic Kidney Disease Epidemiology Collaboration   (CKD-EPI) equation.       GFR Estimate If Black 10/08/2020 >90  >60 mL/min/[1.73_m2] Final    Comment:  GFR Calc  Starting 12/18/2018, serum creatinine based estimated GFR (eGFR) will be   calculated using the Chronic Kidney Disease Epidemiology Collaboration   (CKD-EPI) equation.       Bilirubin Direct 10/08/2020 <0.1  0.0 - 0.2 mg/dL Final     Bilirubin Total 10/08/2020 0.3  0.2 - 1.3 mg/dL Final     Albumin 10/08/2020 3.7  3.4 - 5.0 g/dL Final     Protein Total 10/08/2020 8.5  6.8 - 8.8 g/dL Final     Alkaline Phosphatase 10/08/2020 65  40 - 150 U/L Final     ALT 10/08/2020 16  0 - 50 U/L Final     AST 10/08/2020 12  0 - 45 U/L Final     Vitamin D Deficiency screening 10/08/2020 44  20 - 75 ug/L Final    Comment: Season, race, dietary intake, and treatment affect the concentration of   25-hydroxy-Vitamin D. Values may decrease during winter months and increase   during summer months. Values 20-29 ug/L may indicate Vitamin D insufficiency   and values <20 ug/L may indicate Vitamin D deficiency.  Vitamin D determination is routinely performed by an immunoassay specific for   25 hydroxyvitamin D3.  If an individual is on vitamin D2 (ergocalciferol)   supplementation,  please specify 25 OH vitamin D2 and D3 level determination by   LCMSMS test [id number].       MTB Quantiferon Result 10/08/2020 Negative  NEG^Negative Final    Comment: No interferon gamma response to M.tuberculosis antigens was detected.   Infection with M.tuberculosis is unlikely, however a single negative result   does not exclude infection. In patients at high risk for infection, a second   test should be considered       TB1 Ag minus Nil 10/08/2020 0.03  IU/mL Final     TB2 Ag minus Nil 10/08/2020 0.01  IU/mL Final     Mitogen minus Nil 10/08/2020 5.37  IU/mL Final     NIL Result 10/08/2020 0.04  IU/mL Final     Hepatitis B Surface Antibody 10/08/2020 0.00  <8.00 m[IU]/mL Final    Nonreactive, No antibody detected when the value is less than 8.00 m[IU]/mL.     Hepatitis C Antibody 10/08/2020 Nonreactive  NR^Nonreactive Final    Comment: Assay performance characteristics have not been established for newborns,   infants, and children       IGG 10/08/2020 1,869* 610 - 1,616 mg/dL Final     Rheumatoid Factor 10/08/2020 58* <12 IU/mL Final     Cyclic Citrullinated Peptide Antib* 10/08/2020 >340* <7 U/mL Final     Cholesterol 10/08/2020 146  <200 mg/dL Final     Triglycerides 10/08/2020 78  <150 mg/dL Final     HDL Cholesterol 10/08/2020 55  >49 mg/dL Final     LDL Cholesterol Calculated 10/08/2020 75  <100 mg/dL Final    Desirable:       <100 mg/dl     Non HDL Cholesterol 10/08/2020 91  <130 mg/dL Final       ***        CC  Patient Care Team:  Elieser Sena MD as PCP - General (Pediatrics)  Leoncio Nieto MD as MD (Pediatric Rheumatology)  Cornelius Leonardo MD as MD (Ophthalmology)  Leoncio Nieot MD as Assigned PCP  LEONCIO NIETO    Copy to patient  Kaci ValadezDelbert  [id number] [id number]

## 2020-12-17 NOTE — LETTER
12/17/2020      RE: Geri THOMSON [facility]  [address]  [place] [address]           Rheumatology History:   Date of symptom onset: 2/14/2016  Date of first visit to center: 3/7/2016  Date of GENE diagnosis: 3/7/2016  ILAR category: polyarticular (RF-positive)  KAYLEEN Status: positive   RF Status: positive   CCP Status: positive   HLA-B27 Status: not done        Ophthalmology History:   Iritis/Uveitis Comorbidity: no   Date of last eye exam: 8/24/2020          Medications:   As of completion of this visit:  Current Outpatient Medications   Medication Sig Dispense Refill     Multiple Vitamins-Minerals (MULTIVITAMIN PO)        Omega-3 Fatty Acids (OMEGA 3 PO)        tofacitinib (XELJANZ) 11 MG 24 hr tablet Take 1 tablet (11 mg) by mouth daily 30 tablet 3     Date of last TB Screen: 10/8/2020         Allergies:   No Known Allergies        Problem list:     Patient Active Problem List    Diagnosis Date Noted     Cyclic citrullinated peptide (CCP) antibody positive 03/07/2016     Positive antinuclear antibody 03/07/2016     High total IgG 03/07/2016     Elevated rheumatoid factor 02/24/2016     Rheumatoid arthritis involving multiple sites with positive rheumatoid factor (H) 02/24/2016     Treat to target goal (8/16/2019):  low disease activity.                Subjective:   Geri is a 20 year old female who was seen in virtual Pediatric Rheumatology clinic today for a video follow up visit.  Geri was last seen in our clinic via video visit 10/8/2020.  The primary encounter diagnosis was Rheumatoid arthritis involving multiple sites with positive rheumatoid factor (H). A diagnosis of Therapeutic drug monitoring was also pertinent to this visit.      Prescribed medications have been administered regularly, without missed doses.  Medications have been tolerated well, without side effects.     Since Geri was last seen she has been doing quite well. Due to increase in joint pain over the late summer and fall, Geri was  started on Xeljanz 10/8/2020. Since starting this medication, she has noticed improvement in her right elbow and fingers which previously were sore. She is not sure if her right elbow is all the way back to normal, but it does not hurt and she does not feel functionally limited by it. She has not noticed any more finger swelling or pain which had been bothering her at the last two visits in 9/2020 and 10/2020. She has no wrist, shoulder, knee, ankle or foot pain. She does not have any stiffness in the morning when she wakes up. She does not feel limited in any physical activities. Granted, at the moment she is relatively inactive due to online course work and her upcoming final exams.      She has not had any significant illnesses. She has noted no bothersome side effects from taking the Xeljanz. She prefers this mode of therapy over injections.        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  All coursework is currently online due to the pandemic. [place]     Comprehensive Review of Systems is negative for any new health concerns.    Information per our standardized questionnaire is as below:    Self Report  Patient Pain Status: 0  Patient Global Assessment of Disease Activity: 8     Patient Hightest Level of Education: some college or technical school     Arthritis History  Morning Stiffness in the past week: no stiffness  Recent Back Pain: No    Has your arthritis stopped from trying any athletic or rigorous activities or interfaced with your ability to do these activities? No  Have you been limited your ability to do normal daily activities in the past week? No  Did you need help from other people to do normal activities in the past week? No  Have you used any aids or devices to help you do normal daily activities in the past week? No    Important Medical Events  Patient has experienced  drug-related serious adverse events since last encounter?: No         Video Visit Time [place]:   Start: 12/17/2020 12:32 pm   Stop: 12/17/2020 01:05 pm          Examination:   There were no vitals taken for this visit.  Facility age limit for growth percentiles is 20 years.  Growth percentile SmartLinks can only be used for patients less than 20 years old.  There is no height or weight on file to calculate BSA.          Examination:   The exam below was performed via [employer] Virtual Visit.     Gen: Well appearing; cooperative. No acute distress.  Head: Normal head and hair.  Eyes: No scleral injection, extraocular movements intact.   Mouth: No jaqueline-oral lesions. Visible teeth appear normal.   Skin/Hair: No rashes or lesions noted on visualized skin. Hair pattern on scalp appears normal.  Resp: No increased work of breathing on room air at rest or with exam maneuvers. No cough or wheeze appreciated.   CV: No diaphoresis, no cyanosis. No lower extremity edema.   Neuro: Alert, interactive. Answers questions appropriately. Speech fluent. CN appear grossly normal. Strength and tone appears grossly normal.   MSK: Full active range of motion within the following areas: cervical spine, TMJ, sternoclavicular, acromioclavicular, glenohumeral, wrists, finger, sacroiliac, hip, knee, ankle, or toe joints. No joint swelling appreciated. Normal gait.     The right elbow will straighten to 180 degrees while left elbow will hyperextend an additional 5 degrees    Right elbow flexion is limited (unable to touch thumb to shoulder) while left elbow flexion is full (able to touch thumb to shoulder)    Possible loss of clear bony landmarks to lateral right ankle but no tenderness to palpation per report.      Total active joints:  1   Total limited joints:  1  Tender entheses count:  0         Last Lab Results:     No visits with results within 1 Day(s) from this visit.   Latest known visit with results is:   Office Visit on 10/08/2020    Component Date Value     WBC 10/08/2020 7.3      RBC Count 10/08/2020 4.40      Hemoglobin 10/08/2020 12.8      Hematocrit 10/08/2020 39.9      MCV 10/08/2020 91      MCH 10/08/2020 29.1      MCHC 10/08/2020 32.1      RDW 10/08/2020 12.1      Platelet Count 10/08/2020 347      Diff Method 10/08/2020 Automated Method      % Neutrophils 10/08/2020 63.6      % Lymphocytes 10/08/2020 27.0      % Monocytes 10/08/2020 6.2      % Eosinophils 10/08/2020 1.8      % Basophils 10/08/2020 1.0      % Immature Granulocytes 10/08/2020 0.4      Nucleated RBCs 10/08/2020 0      Absolute Neutrophil 10/08/2020 4.6      Absolute Lymphocytes 10/08/2020 2.0      Absolute Monocytes 10/08/2020 0.5      Absolute Eosinophils 10/08/2020 0.1      Absolute Basophils 10/08/2020 0.1      Abs Immature Granulocytes 10/08/2020 0.0      Absolute Nucleated RBC 10/08/2020 0.0      Creatinine 10/08/2020 0.55      GFR Estimate 10/08/2020 >90      GFR Estimate If Black 10/08/2020 >90      Bilirubin Direct 10/08/2020 <0.1      Bilirubin Total 10/08/2020 0.3      Albumin 10/08/2020 3.7      Protein Total 10/08/2020 8.5      Alkaline Phosphatase 10/08/2020 65      ALT 10/08/2020 16      AST 10/08/2020 12      Vitamin D Deficiency scr* 10/08/2020 44      MTB Quantiferon Result 10/08/2020 Negative      TB1 Ag minus Nil 10/08/2020 0.03      TB2 Ag minus Nil 10/08/2020 0.01      Mitogen minus Nil 10/08/2020 5.37      NIL Result 10/08/2020 0.04      Hepatitis B Surface Anti* 10/08/2020 0.00      Hepatitis C Antibody 10/08/2020 Nonreactive      IGG 10/08/2020 1,869*     Rheumatoid Factor 10/08/2020 58*     Cyclic Citrullinated Pep* 10/08/2020 >340*     Cholesterol 10/08/2020 146      Triglycerides 10/08/2020 78      HDL Cholesterol 10/08/2020 55      LDL Cholesterol Calculat* 10/08/2020 75      Non HDL Cholesterol 10/08/2020 91             Assessment:   Geri is a 20 year old female with rheumatoid arthritis with erosive disease and strongly positive rheumatoid  factor and CCP antibodies.  She was started on Xeljanz therapy 10/8/2020 with good improvement in joint symptoms including right elbow pain and pain and swelling of several digits on both hands noted at last visit. When she began to flare, she had been off her previous combination therapy of Enbrel, methotrexate, and hydroxychloroquine. Per previous documentation from Dr. Reese, she had been relatively well controlled from a physical exam perspective on her previous combination therapy but still had persisting laboratory abnormalities and erosive damage.  Therefore when her joints flared this fall a different therapy modality was chosen in the form of the JAK inhibitor Xeljanz. She is happy with the once a day oral dosing regimen, has not noted any side effects, and does feel that her joint symptoms have improved. Minor loss of full flexion and extension of the right elbow was observed on today's exam without swelling or tenderness. Overall, this seems to be a positive trajectory and will plan to continue the current plan of care with close follow-up.       ACR Functional Class: Normal     (This is measured on the scale of 0 - 10)  (COIN) On Medication For Treatment Of GENE?: Yes        Health counseling reviewed: exercise, infection, treatment adherence  Provider assessment of disease activity: 1  Is patient on medication for the treatment of GENE: Yes    Treat to Target:   eATKGU01 score: 10  Treatment target set: Yes   Treatment target: inactive disease   Disease activity: not at target   Physical function: not at target   Use of algorithm: No          Plan:   1. Laboratory testing ordered today, to be done in next few weeks. If stable, will then plan for every 3 months to monitor medications and disease activity.    2. No imaging is needed today.   3. No new referrals made today.  4. Medications: As listed. Changes made today: none.  5. Continue eye exam monitoring every 12 months.   Return in about 3 months  (around 3/17/2021) for Follow up, with me, in clinic.    If there are any new questions or concerns, I would be glad to help and can be reached through our main office at [phone] or our paging  at [phone].    Alison M. Lerman, MD  Adult and Pediatric Rheumatology Fellow    I was present for the entire video visit, including the discussion and examination, and reviewed and edited the fellow's note and agree with the plan of care.  Hardy Reese M.D.   Professor of Pediatrics    Pediatric Rheumatology            Addendum:  Laboratory Investigations:   Laboratory investigations ordered today but will be done within next few weeks. These results will be reported in a separate letter.      Alison M. Lerman, MD    CC  Patient Care Team:  Elieser Sena MD as PCP - General (Pediatrics)  Cornelius Leonardo MD as MD (Ophthalmology)    Copy to patient  Kaci Valadez Rory,Delbert  [address]  [place] [address]

## 2020-12-31 DIAGNOSIS — M05.79 RHEUMATOID ARTHRITIS INVOLVING MULTIPLE SITES WITH POSITIVE RHEUMATOID FACTOR (H): Chronic | ICD-10-CM

## 2020-12-31 DIAGNOSIS — Z51.81 THERAPEUTIC DRUG MONITORING: ICD-10-CM

## 2020-12-31 LAB
BASOPHILS # BLD AUTO: 0.1 10E9/L (ref 0–0.2)
BASOPHILS NFR BLD AUTO: 0.8 %
CHOLEST SERPL-MCNC: 190 MG/DL
DIFFERENTIAL METHOD BLD: NORMAL
EOSINOPHIL # BLD AUTO: 0.1 10E9/L (ref 0–0.7)
EOSINOPHIL NFR BLD AUTO: 1.4 %
ERYTHROCYTE [DISTWIDTH] IN BLOOD BY AUTOMATED COUNT: 12 % (ref 10–15)
HCT VFR BLD AUTO: 43.3 % (ref 35–47)
HDLC SERPL-MCNC: 65 MG/DL
HGB BLD-MCNC: 14.2 G/DL (ref 11.7–15.7)
IMM GRANULOCYTES # BLD: 0 10E9/L (ref 0–0.4)
IMM GRANULOCYTES NFR BLD: 0.3 %
LDLC SERPL CALC-MCNC: 106 MG/DL
LYMPHOCYTES # BLD AUTO: 2 10E9/L (ref 0.8–5.3)
LYMPHOCYTES NFR BLD AUTO: 26.2 %
MCH RBC QN AUTO: 29.4 PG (ref 26.5–33)
MCHC RBC AUTO-ENTMCNC: 32.8 G/DL (ref 31.5–36.5)
MCV RBC AUTO: 90 FL (ref 78–100)
MONOCYTES # BLD AUTO: 0.5 10E9/L (ref 0–1.3)
MONOCYTES NFR BLD AUTO: 5.9 %
NEUTROPHILS # BLD AUTO: 5.1 10E9/L (ref 1.6–8.3)
NEUTROPHILS NFR BLD AUTO: 65.4 %
NONHDLC SERPL-MCNC: 125 MG/DL
NRBC # BLD AUTO: 0 10*3/UL
NRBC BLD AUTO-RTO: 0 /100
PLATELET # BLD AUTO: 333 10E9/L (ref 150–450)
RBC # BLD AUTO: 4.83 10E12/L (ref 3.8–5.2)
TRIGL SERPL-MCNC: 96 MG/DL
WBC # BLD AUTO: 7.8 10E9/L (ref 4–11)

## 2020-12-31 PROCEDURE — 85025 COMPLETE CBC W/AUTO DIFF WBC: CPT | Performed by: STUDENT IN AN ORGANIZED HEALTH CARE EDUCATION/TRAINING PROGRAM

## 2020-12-31 PROCEDURE — 80061 LIPID PANEL: CPT | Performed by: STUDENT IN AN ORGANIZED HEALTH CARE EDUCATION/TRAINING PROGRAM

## 2020-12-31 PROCEDURE — 36415 COLL VENOUS BLD VENIPUNCTURE: CPT | Performed by: STUDENT IN AN ORGANIZED HEALTH CARE EDUCATION/TRAINING PROGRAM

## 2021-01-06 ENCOUNTER — TELEPHONE (OUTPATIENT)
Dept: RHEUMATOLOGY | Facility: CLINIC | Age: 21
End: 2021-01-06

## 2021-01-06 NOTE — TELEPHONE ENCOUNTER
Called patient to discuss results of safety labs done 12/31. Discussed that all levels were within normal limits with the exception of her LDL which was slightly elevated (106, upper limit of normal <100). This test was done at 11:30am, and the patient stated she had not had anything to eat that day prior to the blood test (so was performed fasting for>12h).     Stated that lipid elevations can be a result of her Xeljanz, but given how well she is doing on the medication and the very mild LDL elevation I preferred to continue the medication, make dietary changes, and recheck the level in the future. Dietary changes discussed included decreasing intake of high fat content animal products such as butter and cheese and avoiding fried foods. Decreasing red meat and pork intake could also help. Increasing intake of fresh fruits and vegetables, whole grains, and lean meats such as chicken or salmon was also recommended.     Patient expressed agreement with the plan.     Alison M. Lerman, MD  Adult and Pediatric Rheumatology Fellow

## 2021-01-13 ENCOUNTER — TELEPHONE (OUTPATIENT)
Dept: RHEUMATOLOGY | Facility: CLINIC | Age: 21
End: 2021-01-13

## 2021-01-13 NOTE — TELEPHONE ENCOUNTER
Prior Authorization Specialty Medication Request    Medication/Dose: Xeljanz 11 mg PO daily  ICD code (if different than what is on RX):          Rationale: Renewal  Pharmacy Information (if different than what is on RX)  Name:  [facility]  Phone:  [phone] ref # [id number]

## 2021-01-14 NOTE — TELEPHONE ENCOUNTER
PA Initiation    Medication: Xeljanz- PENDING   Insurance Company: [employer] - Phone [phone] Fax [phone]  Pharm[facility] Filling the Rx: [facility] [address] [place] [address]  Filling Pharmacy Phone:    Filling Pharmacy Fax:    Start Date: 1/14/2021

## 2021-01-15 ENCOUNTER — HEALTH MAINTENANCE LETTER (OUTPATIENT)
Age: 21
End: 2021-01-15

## 2021-02-08 DIAGNOSIS — M05.79 RHEUMATOID ARTHRITIS INVOLVING MULTIPLE SITES WITH POSITIVE RHEUMATOID FACTOR (H): Chronic | ICD-10-CM

## 2021-02-08 NOTE — TELEPHONE ENCOUNTER
Prior Authorization Approval    Authorization Effective Date: 12/15/2020  Authorization Expiration Date: 1/16/2022  Medication: Xeljanz- Approved  Approved Dose/Quantity:11mg  Reference #: Case#[id number]   Insurance Company: MED[employer] - Phone [phone] Fax [phone]  Expected CoPay:       CoPay Card Available: Yes    Foundation Assistance Needed:    Which Pharmacy is filling the prescription (Not needed for infusion/clinic administered): [facility] - [address] [place] [address]  Pharmacy Notified: Yes  Patient Notified: No

## 2021-02-15 ENCOUNTER — OFFICE VISIT (OUTPATIENT)
Dept: RHEUMATOLOGY | Facility: CLINIC | Age: 21
End: 2021-02-15
Attending: STUDENT IN AN ORGANIZED HEALTH CARE EDUCATION/TRAINING PROGRAM
Payer: COMMERCIAL

## 2021-02-15 VITALS
WEIGHT: 135.36 LBS | HEIGHT: 63 IN | SYSTOLIC BLOOD PRESSURE: 114 MMHG | TEMPERATURE: 98.4 F | HEART RATE: 86 BPM | BODY MASS INDEX: 23.98 KG/M2 | DIASTOLIC BLOOD PRESSURE: 78 MMHG

## 2021-02-15 DIAGNOSIS — M05.79 RHEUMATOID ARTHRITIS INVOLVING MULTIPLE SITES WITH POSITIVE RHEUMATOID FACTOR (H): Primary | ICD-10-CM

## 2021-02-15 LAB
ALBUMIN SERPL-MCNC: 4 G/DL (ref 3.4–5)
ALP SERPL-CCNC: 58 U/L (ref 40–150)
ALT SERPL W P-5'-P-CCNC: 15 U/L (ref 0–50)
AST SERPL W P-5'-P-CCNC: 16 U/L (ref 0–45)
BASOPHILS # BLD AUTO: 0.1 10E9/L (ref 0–0.2)
BASOPHILS NFR BLD AUTO: 1.5 %
BILIRUB DIRECT SERPL-MCNC: <0.1 MG/DL (ref 0–0.2)
BILIRUB SERPL-MCNC: 0.2 MG/DL (ref 0.2–1.3)
DIFFERENTIAL METHOD BLD: NORMAL
EOSINOPHIL # BLD AUTO: 0.1 10E9/L (ref 0–0.7)
EOSINOPHIL NFR BLD AUTO: 1 %
ERYTHROCYTE [DISTWIDTH] IN BLOOD BY AUTOMATED COUNT: 11.7 % (ref 10–15)
HCT VFR BLD AUTO: 40.5 % (ref 35–47)
HGB BLD-MCNC: 13.3 G/DL (ref 11.7–15.7)
IMM GRANULOCYTES # BLD: 0 10E9/L (ref 0–0.4)
IMM GRANULOCYTES NFR BLD: 0.1 %
LYMPHOCYTES # BLD AUTO: 2.6 10E9/L (ref 0.8–5.3)
LYMPHOCYTES NFR BLD AUTO: 33.7 %
MCH RBC QN AUTO: 29.9 PG (ref 26.5–33)
MCHC RBC AUTO-ENTMCNC: 32.8 G/DL (ref 31.5–36.5)
MCV RBC AUTO: 91 FL (ref 78–100)
MONOCYTES # BLD AUTO: 0.6 10E9/L (ref 0–1.3)
MONOCYTES NFR BLD AUTO: 7.7 %
NEUTROPHILS # BLD AUTO: 4.4 10E9/L (ref 1.6–8.3)
NEUTROPHILS NFR BLD AUTO: 56 %
NRBC # BLD AUTO: 0 10*3/UL
NRBC BLD AUTO-RTO: 0 /100
PLATELET # BLD AUTO: 374 10E9/L (ref 150–450)
PROT SERPL-MCNC: 8.3 G/DL (ref 6.8–8.8)
RBC # BLD AUTO: 4.45 10E12/L (ref 3.8–5.2)
WBC # BLD AUTO: 7.8 10E9/L (ref 4–11)

## 2021-02-15 PROCEDURE — 99214 OFFICE O/P EST MOD 30 MIN: CPT | Mod: GC | Performed by: STUDENT IN AN ORGANIZED HEALTH CARE EDUCATION/TRAINING PROGRAM

## 2021-02-15 PROCEDURE — 85025 COMPLETE CBC W/AUTO DIFF WBC: CPT | Performed by: STUDENT IN AN ORGANIZED HEALTH CARE EDUCATION/TRAINING PROGRAM

## 2021-02-15 PROCEDURE — 999N000103 HC STATISTIC NO CHARGE FACILITY FEE

## 2021-02-15 PROCEDURE — 80076 HEPATIC FUNCTION PANEL: CPT | Performed by: STUDENT IN AN ORGANIZED HEALTH CARE EDUCATION/TRAINING PROGRAM

## 2021-02-15 PROCEDURE — 36415 COLL VENOUS BLD VENIPUNCTURE: CPT | Performed by: STUDENT IN AN ORGANIZED HEALTH CARE EDUCATION/TRAINING PROGRAM

## 2021-02-15 ASSESSMENT — PAIN SCALES - GENERAL: PAINLEVEL: NO PAIN (0)

## 2021-02-15 ASSESSMENT — MIFFLIN-ST. JEOR: SCORE: 1349.87

## 2021-02-15 NOTE — LETTER
2/15/2021      RE: Geri THOMSON Rory  [address]  [place] [address]           Rheumatology History:   Date of symptom onset: 2/14/2016  Date of first visit to center: 3/7/2016  Date of GENE diagnosis: 3/7/2016  ILAR category: polyarticular (RF-positive)  KAYLEEN Status: positive   RF Status: positive   CCP Status: positive   [id number] Status: not done        Ophthalmology History:   Iritis/Uveitis Comorbidity: no   Date of last eye exam:  8/24/2020          Medications:   As of completion of this visit:  Current Outpatient Medications   Medication Sig Dispense Refill     Multiple Vitamins-Minerals (MULTIVITAMIN PO)        tofacitinib (XELJANZ) 11 MG 24 hr tablet Take 1 tablet (11 mg) by mouth daily 30 tablet 3     Omega-3 Fatty Acids (OMEGA 3 PO)        Date of last TB Screen: 10/8/2020         Allergies:   No Known Allergies        Problem list:     Patient Active Problem List    Diagnosis Date Noted     Cyclic citrullinated peptide (CCP) antibody positive 03/07/2016     Priority: Medium     Positive antinuclear antibody 03/07/2016     Priority: Medium     High total IgG 03/07/2016     Priority: Medium     Elevated rheumatoid factor 02/24/2016     Priority: Medium     Rheumatoid arthritis involving multiple sites with positive rheumatoid factor (H) 02/24/2016     Priority: Medium     - Previous therapies: Enbrel, methotrexate, HCQ. Stopped in 2019 due to inactive disease.   - Xeljanz started 10/8/2020 after flare in R elbow and R hand.            Subjective:   Geri is a 20 year old female who was seen in virtual Pediatric Rheumatology clinic today for a video follow up visit.  Geri was last seen in our clinic via video visit on 12/17/2020.  The encounter diagnosis was Rheumatoid arthritis involving multiple sites with positive rheumatoid factor (H).      Prescribed medications have been administered regularly, without missed doses.  Medications have been tolerated well, without side effects.     Since Geri  was last seen she has been doing quite well. At the last visit her right elbow felt like it was getting stuck a little bit with extension of the arm, but was not painful or swollen. Today she feels that this stuck feeling may have resolved. She has not noted any new joint pain or swelling. She does not have any morning stiffness. She is not the most physically active currently given cold weather, online classes, etc but when she does group workouts with her friends with online videos she does not feel limited by her joint pain.      She has not had any significant illnesses. She has noted no bothersome side effects from taking the Xeljanz. She prefers this mode of therapy over injectable medications or her previous multidrug regimen.        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  All coursework is currently online due to the pandemic. She was previously living with her parents but has moved in with roommates in [facility]. 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     Comprehensive Review of Systems is negative for any new health concerns.    Information per our standardized questionnaire is as below:    Self Report  Patient Pain Status: 0  Patient Global Assessment of Disease Activity: 0     Patient Hightest Level of Education: some college or technical school     Arthritis History  Morning Stiffness in the past week: no stiffness  Recent Back Pain: No    Has your arthritis stopped from trying any athletic or rigorous activities or interfaced with your ability to do these activities? No  Have you been limited your ability to do normal daily activities in the past week? No  Did you need help from other people to do normal activities in the past week? No  Have you used any aids or devices to help you do normal daily activities in the past week? No    Important Medical Events  Patient has experienced drug-related serious adverse events  "since last encounter?: No               Examination:   Blood pressure 114/78, pulse 86, temperature 98.4  F (36.9  C), temperature source Tympanic, height 1.603 m (5' 3.11\"), weight 61.4 kg (135 lb 5.8 oz).  Facility age limit for growth percentiles is 20 years.  Growth percentile SmartLinks can only be used for patients less than 20 years old.  Body surface area is 1.65 meters squared.          Examination:   Gen: Well appearing; cooperative. No acute distress.  Head: Normal head and hair.  Eyes: No scleral injection, extraocular movements intact, PERRL.   Mouth: MMM, no oral lesions. Visible teeth appear normal.   Skin/Hair: No rashes or lesions noted on visualized skin other than scattered open and closed comedones over the face. Hair pattern on scalp appears normal.  Resp: CTA BL. No cough or wheeze appreciated.   CV: RRR, no m/r/g. No diaphoresis, no cyanosis. No lower extremity edema.   Neuro: Alert, interactive. Answers questions appropriately. Speech fluent. CN appear grossly normal. Strength 5/5 in upper extremities, 4.5/5 in proximal lower extremities and 5/5 in distal lower extremities   MSK: Full active range of motion within the following areas: cervical spine, TMJ, sternoclavicular, acromioclavicular, glenohumeral, wrists, finger, sacroiliac, hip, knee, ankle, or toe joints. No joint swelling appreciated. Normal gait.     The right elbow will straighten to 180 degrees while left elbow will hyperextend an additional 5 degrees    Right elbow flexion has now normalized since the last exam (able to touch thumb to shoulder now bilaterally)      Total active joints:  0   Total limited joints:  1  Tender entheses count:  0         Last Lab Results:     Orders Only on 12/31/2020   Component Date Value Ref Range Status     WBC 12/31/2020 7.8  4.0 - 11.0 10e9/L Final     RBC Count 12/31/2020 4.83  3.8 - 5.2 10e12/L Final     Hemoglobin 12/31/2020 14.2  11.7 - 15.7 g/dL Final     Hematocrit 12/31/2020 43.3  35.0 " - 47.0 % Final     MCV 12/31/2020 90  78 - 100 fl Final     MCH 12/31/2020 29.4  26.5 - 33.0 pg Final     MCHC 12/31/2020 32.8  31.5 - 36.5 g/dL Final     RDW 12/31/2020 12.0  10.0 - 15.0 % Final     Platelet Count 12/31/2020 333  150 - 450 10e9/L Final     Diff Method 12/31/2020 Automated Method   Final     % Neutrophils 12/31/2020 65.4  % Final     % Lymphocytes 12/31/2020 26.2  % Final     % Monocytes 12/31/2020 5.9  % Final     % Eosinophils 12/31/2020 1.4  % Final     % Basophils 12/31/2020 0.8  % Final     % Immature Granulocytes 12/31/2020 0.3  % Final     Nucleated RBCs 12/31/2020 0  0 /100 Final     Absolute Neutrophil 12/31/2020 5.1  1.6 - 8.3 10e9/L Final     Absolute Lymphocytes 12/31/2020 2.0  0.8 - 5.3 10e9/L Final     Absolute Monocytes 12/31/2020 0.5  0.0 - 1.3 10e9/L Final     Absolute Eosinophils 12/31/2020 0.1  0.0 - 0.7 10e9/L Final     Absolute Basophils 12/31/2020 0.1  0.0 - 0.2 10e9/L Final     Abs Immature Granulocytes 12/31/2020 0.0  0 - 0.4 10e9/L Final     Absolute Nucleated RBC 12/31/2020 0.0   Final     Cholesterol 12/31/2020 190  <200 mg/dL Final     Triglycerides 12/31/2020 96  <150 mg/dL Final     HDL Cholesterol 12/31/2020 65  >49 mg/dL Final     LDL Cholesterol Calculated 12/31/2020 106* <100 mg/dL Final    Comment: Above desirable:  100-129 mg/dl  Borderline High:  130-159 mg/dL  High:             160-189 mg/dL  Very high:       >189 mg/dl       Non HDL Cholesterol 12/31/2020 125  <130 mg/dL Final            Assessment:   Geri is a 20 year old female with rheumatoid arthritis with erosive disease and strongly positive rheumatoid factor and CCP antibodies, currently well controlled on Xeljanz. Xeljanz therapy was started in 10/2020 after she had a flare in the late summer/fall of 2020 in her right elbow as well as several digits on both hands. When she began to flare, she had been off her previous combination therapy of Enbrel, methotrexate, and hydroxychloroquine for over a year.  She did not want to restart a complex multidrug regimen when her joint symptoms flared. Per previous documentation from Dr. Reese, she had been relatively well controlled from a physical exam perspective on her previous combination therapy but still had persisting laboratory abnormalities and erosive damage.  Therefore when her joints flared this fall a different therapy modality was chosen in the form of the JAK inhibitor Xeljanz.     The previous mild limitations observed in her right elbow range of motion continue to improve. No new areas of arthritis are seen on examination today. Overall, Geri is doing very well on the current treatment regimen and we will plan to continue this agent going forward.     Health counseling reviewed: immunization, infection, medication side effect, exercise  Provider assessment of disease activity: 0.5  Is patient on medication for the treatment of GENE: Yes  [id number] score: 0.5         Plan:   1. Laboratory testing ordered today, if stable can wait until next visit in 6/2021 for labs.    2. No imaging is needed today.   3. No new referrals made today.  4. Medications: As listed. Changes made today: none.  5. Continue eye exam monitoring every 12 months.   Return in about 4 months (around 6/15/2021) for with me, Follow up, in person.    If there are any new questions or concerns, I would be glad to help and can be reached through our main office at [phone] or our paging  at [phone].    Alison M. Lerman, MD  Adult and Pediatric Rheumatology Fellow         Addendum:  Laboratory Investigations:   Laboratory investigations ordered today that have resulted by the time of this documentation are included below. Any pending results will be reported in a separate letter.    Office Visit on 02/15/2021   Component Date Value Ref Range Status     WBC 02/15/2021 7.8  4.0 - 11.0 10e9/L Final     RBC Count 02/15/2021 4.45  3.8 - 5.2 10e12/L Final     Hemoglobin 02/15/2021 13.3   11.7 - 15.7 g/dL Final     Hematocrit 02/15/2021 40.5  35.0 - 47.0 % Final     MCV 02/15/2021 91  78 - 100 fl Final     MCH 02/15/2021 29.9  26.5 - 33.0 pg Final     MCHC 02/15/2021 32.8  31.5 - 36.5 g/dL Final     RDW 02/15/2021 11.7  10.0 - 15.0 % Final     Platelet Count 02/15/2021 374  150 - 450 10e9/L Final     Diff Method 02/15/2021 Automated Method   Final     % Neutrophils 02/15/2021 56.0  % Final     % Lymphocytes 02/15/2021 33.7  % Final     % Monocytes 02/15/2021 7.7  % Final     % Eosinophils 02/15/2021 1.0  % Final     % Basophils 02/15/2021 1.5  % Final     % Immature Granulocytes 02/15/2021 0.1  % Final     Nucleated RBCs 02/15/2021 0  0 /100 Final     Absolute Neutrophil 02/15/2021 4.4  1.6 - 8.3 10e9/L Final     Absolute Lymphocytes 02/15/2021 2.6  0.8 - 5.3 10e9/L Final     Absolute Monocytes 02/15/2021 0.6  0.0 - 1.3 10e9/L Final     Absolute Eosinophils 02/15/2021 0.1  0.0 - 0.7 10e9/L Final     Absolute Basophils 02/15/2021 0.1  0.0 - 0.2 10e9/L Final     Abs Immature Granulocytes 02/15/2021 0.0  0 - 0.4 10e9/L Final     Absolute Nucleated RBC 02/15/2021 0.0   Final     Bilirubin Direct 02/15/2021 <0.1  0.0 - 0.2 mg/dL Final     Bilirubin Total 02/15/2021 0.2  0.2 - 1.3 mg/dL Final     Albumin 02/15/2021 4.0  3.4 - 5.0 g/dL Final     Protein Total 02/15/2021 8.3  6.8 - 8.8 g/dL Final     Alkaline Phosphatase 02/15/2021 58  40 - 150 U/L Final     ALT 02/15/2021 15  0 - 50 U/L Final     AST 02/15/2021 16  0 - 45 U/L Final     All laboratory values are within normal limits, which is excellent. We will plan to repeat labs with a lipid panel in four months pending ongoing clinical stability.     Physician Attestation   I, Vannessa Markham MD, saw this patient and agree with the findings and plan of care as documented in the note.      Items personally reviewed/procedural attestation: vitals and labs.    Vannessa Markham M.D.   of Pediatrics    Pediatric Rheumatology      CC  Patient Care Team:  Elieser Sena MD as PCP - General (Pediatrics)  Cornelius Leonardo MD as MD (Ophthalmology)  Lerman, Alison Michelle, MD as Fellow (Rheumatology)  Vannessa Markham MD as MD (Pediatric Rheumatology)    Copy to patient    Parent(s) of Geri [facility]  [address] [place] [place] [address]

## 2021-02-15 NOTE — NURSING NOTE
"Chief Complaint   Patient presents with     RECHECK     Rheumatoid arthritis involving multiple sites with positive rheumatoid factor.     /78 (BP Location: Right arm, Patient Position: Sitting, Cuff Size: Adult Regular)   Pulse 86   Temp 98.4  F (36.9  C) (Tympanic)   Ht 5' 3.11\" (160.3 cm)   Wt 135 lb 5.8 oz (61.4 kg)   BMI 23.89 kg/m    Penny Raman LPN  February 15, 2021  "

## 2021-02-15 NOTE — PROGRESS NOTES
Rheumatology History:   Date of symptom onset: 2/14/2016  Date of first visit to center: 3/7/2016  Date of GENE diagnosis: 3/7/2016  ILAR category: polyarticular (RF-positive)  KAYLEEN Status: positive   RF Status: positive   CCP Status: positive   [id number] Status: not done        Ophthalmology History:   Iritis/Uveitis Comorbidity: no   Date of last eye exam:  8/24/2020          Medications:   As of completion of this visit:  Current Outpatient Medications   Medication Sig Dispense Refill     Multiple Vitamins-Minerals (MULTIVITAMIN PO)        tofacitinib (XELJANZ) 11 MG 24 hr tablet Take 1 tablet (11 mg) by mouth daily 30 tablet 3     Omega-3 Fatty Acids (OMEGA 3 PO)        Date of last TB Screen: 10/8/2020         Allergies:   No Known Allergies        Problem list:     Patient Active Problem List    Diagnosis Date Noted     Cyclic citrullinated peptide (CCP) antibody positive 03/07/2016     Priority: Medium     Positive antinuclear antibody 03/07/2016     Priority: Medium     High total IgG 03/07/2016     Priority: Medium     Elevated rheumatoid factor 02/24/2016     Priority: Medium     Rheumatoid arthritis involving multiple sites with positive rheumatoid factor (H) 02/24/2016     Priority: Medium     - Previous therapies: Enbrel, methotrexate, HCQ. Stopped in 2019 due to inactive disease.   - Xeljanz started 10/8/2020 after flare in R elbow and R hand.            Subjective:   Geri is a 20 year old female who was seen in virtual Pediatric Rheumatology clinic today for a video follow up visit.  Geri was last seen in our clinic via video visit on 12/17/2020.  The encounter diagnosis was Rheumatoid arthritis involving multiple sites with positive rheumatoid factor (H).      Prescribed medications have been administered regularly, without missed doses.  Medications have been tolerated well, without side effects.     Since Geri was last seen she has been doing quite well. At the last visit her right elbow felt  like it was getting stuck a little bit with extension of the arm, but was not painful or swollen. Today she feels that this stuck feeling may have resolved. She has not noted any new joint pain or swelling. She does not have any morning stiffness. She is not the most physically active currently given cold weather, online classes, etc but when she does group workouts with her friends with online videos she does not feel limited by her joint pain.      She has not had any significant illnesses. She has noted no bothersome side effects from taking the Xeljanz. She prefers this mode of therapy over injectable medications or her previous multidrug regimen.        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  All coursework is currently online due to the pandemic. She was previously living with her parents but has moved in with roommates in [facility]. 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     Comprehensive Review of Systems is negative for any new health concerns.    Information per our standardized questionnaire is as below:    Self Report  Patient Pain Status: 0  Patient Global Assessment of Disease Activity: 0     Patient Hightest Level of Education: some college or technical school     Arthritis History  Morning Stiffness in the past week: no stiffness  Recent Back Pain: No    Has your arthritis stopped from trying any athletic or rigorous activities or interfaced with your ability to do these activities? No  Have you been limited your ability to do normal daily activities in the past week? No  Did you need help from other people to do normal activities in the past week? No  Have you used any aids or devices to help you do normal daily activities in the past week? No    Important Medical Events  Patient has experienced drug-related serious adverse events since last encounter?: No               Examination:   Blood pressure 114/78, pulse  "86, temperature 98.4  F (36.9  C), temperature source Tympanic, height 1.603 m (5' 3.11\"), weight 61.4 kg (135 lb 5.8 oz).  Facility age limit for growth percentiles is 20 years.  Growth percentile SmartLinks can only be used for patients less than 20 years old.  Body surface area is 1.65 meters squared.          Examination:   Gen: Well appearing; cooperative. No acute distress.  Head: Normal head and hair.  Eyes: No scleral injection, extraocular movements intact, PERRL.   Mouth: MMM, no oral lesions. Visible teeth appear normal.   Skin/Hair: No rashes or lesions noted on visualized skin other than scattered open and closed comedones over the face. Hair pattern on scalp appears normal.  Resp: CTA BL. No cough or wheeze appreciated.   CV: RRR, no m/r/g. No diaphoresis, no cyanosis. No lower extremity edema.   Neuro: Alert, interactive. Answers questions appropriately. Speech fluent. CN appear grossly normal. Strength 5/5 in upper extremities, 4.5/5 in proximal lower extremities and 5/5 in distal lower extremities   MSK: Full active range of motion within the following areas: cervical spine, TMJ, sternoclavicular, acromioclavicular, glenohumeral, wrists, finger, sacroiliac, hip, knee, ankle, or toe joints. No joint swelling appreciated. Normal gait.     The right elbow will straighten to 180 degrees while left elbow will hyperextend an additional 5 degrees    Right elbow flexion has now normalized since the last exam (able to touch thumb to shoulder now bilaterally)      Total active joints:  0   Total limited joints:  1  Tender entheses count:  0         Last Lab Results:     Orders Only on 12/31/2020   Component Date Value Ref Range Status     WBC 12/31/2020 7.8  4.0 - 11.0 10e9/L Final     RBC Count 12/31/2020 4.83  3.8 - 5.2 10e12/L Final     Hemoglobin 12/31/2020 14.2  11.7 - 15.7 g/dL Final     Hematocrit 12/31/2020 43.3  35.0 - 47.0 % Final     MCV 12/31/2020 90  78 - 100 fl Final     MCH 12/31/2020 29.4  " 26.5 - 33.0 pg Final     MCHC 12/31/2020 32.8  31.5 - 36.5 g/dL Final     RDW 12/31/2020 12.0  10.0 - 15.0 % Final     Platelet Count 12/31/2020 333  150 - 450 10e9/L Final     Diff Method 12/31/2020 Automated Method   Final     % Neutrophils 12/31/2020 65.4  % Final     % Lymphocytes 12/31/2020 26.2  % Final     % Monocytes 12/31/2020 5.9  % Final     % Eosinophils 12/31/2020 1.4  % Final     % Basophils 12/31/2020 0.8  % Final     % Immature Granulocytes 12/31/2020 0.3  % Final     Nucleated RBCs 12/31/2020 0  0 /100 Final     Absolute Neutrophil 12/31/2020 5.1  1.6 - 8.3 10e9/L Final     Absolute Lymphocytes 12/31/2020 2.0  0.8 - 5.3 10e9/L Final     Absolute Monocytes 12/31/2020 0.5  0.0 - 1.3 10e9/L Final     Absolute Eosinophils 12/31/2020 0.1  0.0 - 0.7 10e9/L Final     Absolute Basophils 12/31/2020 0.1  0.0 - 0.2 10e9/L Final     Abs Immature Granulocytes 12/31/2020 0.0  0 - 0.4 10e9/L Final     Absolute Nucleated RBC 12/31/2020 0.0   Final     Cholesterol 12/31/2020 190  <200 mg/dL Final     Triglycerides 12/31/2020 96  <150 mg/dL Final     HDL Cholesterol 12/31/2020 65  >49 mg/dL Final     LDL Cholesterol Calculated 12/31/2020 106* <100 mg/dL Final    Comment: Above desirable:  100-129 mg/dl  Borderline High:  130-159 mg/dL  High:             160-189 mg/dL  Very high:       >189 mg/dl       Non HDL Cholesterol 12/31/2020 125  <130 mg/dL Final            Assessment:   Geri is a 20 year old female with rheumatoid arthritis with erosive disease and strongly positive rheumatoid factor and CCP antibodies, currently well controlled on Xeljanz. Xeljanz therapy was started in 10/2020 after she had a flare in the late summer/fall of 2020 in her right elbow as well as several digits on both hands. When she began to flare, she had been off her previous combination therapy of Enbrel, methotrexate, and hydroxychloroquine for over a year. She did not want to restart a complex multidrug regimen when her joint symptoms  flared. Per previous documentation from Dr. Reese, she had been relatively well controlled from a physical exam perspective on her previous combination therapy but still had persisting laboratory abnormalities and erosive damage.  Therefore when her joints flared this fall a different therapy modality was chosen in the form of the JAK inhibitor Xeljanz.     The previous mild limitations observed in her right elbow range of motion continue to improve. No new areas of arthritis are seen on examination today. Overall, Geri is doing very well on the current treatment regimen and we will plan to continue this agent going forward.     Health counseling reviewed: immunization, infection, medication side effect, exercise  Provider assessment of disease activity: 0.5  Is patient on medication for the treatment of GENE: Yes  [id number] score: 0.5         Plan:   1. Laboratory testing ordered today, if stable can wait until next visit in 6/2021 for labs.    2. No imaging is needed today.   3. No new referrals made today.  4. Medications: As listed. Changes made today: none.  5. Continue eye exam monitoring every 12 months.   Return in about 4 months (around 6/15/2021) for with me, Follow up, in person.    If there are any new questions or concerns, I would be glad to help and can be reached through our main office at [phone] or our paging  at [phone].    Alison M. Lerman, MD  Adult and Pediatric Rheumatology Fellow         Addendum:  Laboratory Investigations:   Laboratory investigations ordered today that have resulted by the time of this documentation are included below. Any pending results will be reported in a separate letter.    Office Visit on 02/15/2021   Component Date Value Ref Range Status     WBC 02/15/2021 7.8  4.0 - 11.0 10e9/L Final     RBC Count 02/15/2021 4.45  3.8 - 5.2 10e12/L Final     Hemoglobin 02/15/2021 13.3  11.7 - 15.7 g/dL Final     Hematocrit 02/15/2021 40.5  35.0 - 47.0 % Final      MCV 02/15/2021 91  78 - 100 fl Final     MCH 02/15/2021 29.9  26.5 - 33.0 pg Final     MCHC 02/15/2021 32.8  31.5 - 36.5 g/dL Final     RDW 02/15/2021 11.7  10.0 - 15.0 % Final     Platelet Count 02/15/2021 374  150 - 450 10e9/L Final     Diff Method 02/15/2021 Automated Method   Final     % Neutrophils 02/15/2021 56.0  % Final     % Lymphocytes 02/15/2021 33.7  % Final     % Monocytes 02/15/2021 7.7  % Final     % Eosinophils 02/15/2021 1.0  % Final     % Basophils 02/15/2021 1.5  % Final     % Immature Granulocytes 02/15/2021 0.1  % Final     Nucleated RBCs 02/15/2021 0  0 /100 Final     Absolute Neutrophil 02/15/2021 4.4  1.6 - 8.3 10e9/L Final     Absolute Lymphocytes 02/15/2021 2.6  0.8 - 5.3 10e9/L Final     Absolute Monocytes 02/15/2021 0.6  0.0 - 1.3 10e9/L Final     Absolute Eosinophils 02/15/2021 0.1  0.0 - 0.7 10e9/L Final     Absolute Basophils 02/15/2021 0.1  0.0 - 0.2 10e9/L Final     Abs Immature Granulocytes 02/15/2021 0.0  0 - 0.4 10e9/L Final     Absolute Nucleated RBC 02/15/2021 0.0   Final     Bilirubin Direct 02/15/2021 <0.1  0.0 - 0.2 mg/dL Final     Bilirubin Total 02/15/2021 0.2  0.2 - 1.3 mg/dL Final     Albumin 02/15/2021 4.0  3.4 - 5.0 g/dL Final     Protein Total 02/15/2021 8.3  6.8 - 8.8 g/dL Final     Alkaline Phosphatase 02/15/2021 58  40 - 150 U/L Final     ALT 02/15/2021 15  0 - 50 U/L Final     AST 02/15/2021 16  0 - 45 U/L Final     All laboratory values are within normal limits, which is excellent. We will plan to repeat labs with a lipid panel in four months pending ongoing clinical stability.     Physician Attestation   I, Vannessa Markham MD, saw this patient and agree with the findings and plan of care as documented in the note.      Items personally reviewed/procedural attestation: vitals and labs.    Vannessa Markham M.D.   of Pediatrics    Pediatric Rheumatology     CC  Patient Care Team:  Elieser Sena MD as PCP - General  (Pediatrics)  Cornelius Leonardo MD as MD (Ophthalmology)  Lerman, Alison Michelle, MD as Fellow (Rheumatology)  Vannessa Markham MD as MD (Pediatric Rheumatology)    Copy to patient  Kaci Valadez,Delbert  [address]  [place] [address]

## 2021-02-15 NOTE — LETTER
2/15/2021      RE: Geri THOMSON Rory  [address]  [place] [address]           Rheumatology History:   Date of symptom onset: 2/14/2016  Date of first visit to center: 3/7/2016  Date of GENE diagnosis: 3/7/2016  ILAR category: polyarticular (RF-positive)  KAYLEEN Status: positive   RF Status: positive   CCP Status: positive   HLA-B27 Status: not done        Ophthalmology History:   Iritis/Uveitis Comorbidity: no   Date of last eye exam:  8/24/2020          Medications:   As of completion of this visit:  Current Outpatient Medications   Medication Sig Dispense Refill     Multiple Vitamins-Minerals (MULTIVITAMIN PO)        tofacitinib (XELJANZ) 11 MG 24 hr tablet Take 1 tablet (11 mg) by mouth daily 30 tablet 3     Omega-3 Fatty Acids (OMEGA 3 PO)        Date of last TB Screen: 10/8/2020         Allergies:   No Known Allergies        Problem list:     Patient Active Problem List    Diagnosis Date Noted     Cyclic citrullinated peptide (CCP) antibody positive 03/07/2016     Priority: Medium     Positive antinuclear antibody 03/07/2016     Priority: Medium     High total IgG 03/07/2016     Priority: Medium     Elevated rheumatoid factor 02/24/2016     Priority: Medium     Rheumatoid arthritis involving multiple sites with positive rheumatoid factor (H) 02/24/2016     Priority: Medium     - Previous therapies: Enbrel, methotrexate, HCQ. Stopped in 2019 due to inactive disease.   - Xeljanz started 10/8/2020 after flare in R elbow and R hand.            Subjective:   Geri is a 20 year old female who was seen in virtual Pediatric Rheumatology clinic today for a video follow up visit.  Geri was last seen in our clinic via video visit on 12/17/2020.  The encounter diagnosis was Rheumatoid arthritis involving multiple sites with positive rheumatoid factor (H).      Prescribed medications have been administered regularly, without missed doses.  Medications have been tolerated well, without side effects.     Since Geri was  last seen she has been doing quite well. At the last visit her right elbow felt like it was getting stuck a little bit with extension of the arm, but was not painful or swollen. Today she feels that this stuck feeling may have resolved. She has not noted any new joint pain or swelling. She does not have any morning stiffness. She is not the most physically active currently given cold weather, online classes, etc but when she does group workouts with her friends with online videos she does not feel limited by her joint pain.      She has not had any significant illnesses. She has noted no bothersome side effects from taking the Xeljanz. She prefers this mode of therapy over injectable medications or her previous multidrug regimen.        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  All coursework is currently online due to the pandemic. She was previously living with her parents but has moved in with roommates in [facility]. 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     Comprehensive Review of Systems is negative for any new health concerns.    Information per our standardized questionnaire is as below:    Self Report  Patient Pain Status: 0  Patient Global Assessment of Disease Activity: 0     Patient Hightest Level of Education: some college or technical school     Arthritis History  Morning Stiffness in the past week: no stiffness  Recent Back Pain: No    Has your arthritis stopped from trying any athletic or rigorous activities or interfaced with your ability to do these activities? No  Have you been limited your ability to do normal daily activities in the past week? No  Did you need help from other people to do normal activities in the past week? No  Have you used any aids or devices to help you do normal daily activities in the past week? No    Important Medical Events  Patient has experienced drug-related serious adverse events since  "last encounter?: No               Examination:   Blood pressure 114/78, pulse 86, temperature 98.4  F (36.9  C), temperature source Tympanic, height 1.603 m (5' 3.11\"), weight 61.4 kg (135 lb 5.8 oz).  Facility age limit for growth percentiles is 20 years.  Growth percentile SmartLinks can only be used for patients less than 20 years old.  Body surface area is 1.65 meters squared.          Examination:   Gen: Well appearing; cooperative. No acute distress.  Head: Normal head and hair.  Eyes: No scleral injection, extraocular movements intact, PERRL.   Mouth: MMM, no oral lesions. Visible teeth appear normal.   Skin/Hair: No rashes or lesions noted on visualized skin other than scattered open and closed comedones over the face. Hair pattern on scalp appears normal.  Resp: CTA BL. No cough or wheeze appreciated.   CV: RRR, no m/r/g. No diaphoresis, no cyanosis. No lower extremity edema.   Neuro: Alert, interactive. Answers questions appropriately. Speech fluent. CN appear grossly normal. Strength 5/5 in upper extremities, 4.5/5 in proximal lower extremities and 5/5 in distal lower extremities   MSK: Full active range of motion within the following areas: cervical spine, TMJ, sternoclavicular, acromioclavicular, glenohumeral, wrists, finger, sacroiliac, hip, knee, ankle, or toe joints. No joint swelling appreciated. Normal gait.     The right elbow will straighten to 180 degrees while left elbow will hyperextend an additional 5 degrees    Right elbow flexion has now normalized since the last exam (able to touch thumb to shoulder now bilaterally)      Total active joints:  0   Total limited joints:  1  Tender entheses count:  0         Last Lab Results:     Orders Only on 12/31/2020   Component Date Value Ref Range Status     WBC 12/31/2020 7.8  4.0 - 11.0 10e9/L Final     RBC Count 12/31/2020 4.83  3.8 - 5.2 10e12/L Final     Hemoglobin 12/31/2020 14.2  11.7 - 15.7 g/dL Final     Hematocrit 12/31/2020 43.3  35.0 - 47.0 " % Final     MCV 12/31/2020 90  78 - 100 fl Final     MCH 12/31/2020 29.4  26.5 - 33.0 pg Final     MCHC 12/31/2020 32.8  31.5 - 36.5 g/dL Final     RDW 12/31/2020 12.0  10.0 - 15.0 % Final     Platelet Count 12/31/2020 333  150 - 450 10e9/L Final     Diff Method 12/31/2020 Automated Method   Final     % Neutrophils 12/31/2020 65.4  % Final     % Lymphocytes 12/31/2020 26.2  % Final     % Monocytes 12/31/2020 5.9  % Final     % Eosinophils 12/31/2020 1.4  % Final     % Basophils 12/31/2020 0.8  % Final     % Immature Granulocytes 12/31/2020 0.3  % Final     Nucleated RBCs 12/31/2020 0  0 /100 Final     Absolute Neutrophil 12/31/2020 5.1  1.6 - 8.3 10e9/L Final     Absolute Lymphocytes 12/31/2020 2.0  0.8 - 5.3 10e9/L Final     Absolute Monocytes 12/31/2020 0.5  0.0 - 1.3 10e9/L Final     Absolute Eosinophils 12/31/2020 0.1  0.0 - 0.7 10e9/L Final     Absolute Basophils 12/31/2020 0.1  0.0 - 0.2 10e9/L Final     Abs Immature Granulocytes 12/31/2020 0.0  0 - 0.4 10e9/L Final     Absolute Nucleated RBC 12/31/2020 0.0   Final     Cholesterol 12/31/2020 190  <200 mg/dL Final     Triglycerides 12/31/2020 96  <150 mg/dL Final     HDL Cholesterol 12/31/2020 65  >49 mg/dL Final     LDL Cholesterol Calculated 12/31/2020 106* <100 mg/dL Final    Comment: Above desirable:  100-129 mg/dl  Borderline High:  130-159 mg/dL  High:             160-189 mg/dL  Very high:       >189 mg/dl       Non HDL Cholesterol 12/31/2020 125  <130 mg/dL Final            Assessment:   Geri is a 20 year old female with rheumatoid arthritis with erosive disease and strongly positive rheumatoid factor and CCP antibodies, currently well controlled on Xeljanz. Xeljanz therapy was started in 10/2020 after she had a flare in the late summer/fall of 2020 in her right elbow as well as several digits on both hands. When she began to flare, she had been off her previous combination therapy of Enbrel, methotrexate, and hydroxychloroquine for over a year. She  did not want to restart a complex multidrug regimen when her joint symptoms flared. Per previous documentation from Dr. Reese, she had been relatively well controlled from a physical exam perspective on her previous combination therapy but still had persisting laboratory abnormalities and erosive damage.  Therefore when her joints flared this fall a different therapy modality was chosen in the form of the JAK inhibitor Xeljanz.     The previous mild limitations observed in her right elbow range of motion continue to improve. No new areas of arthritis are seen on examination today. Overall, Geri is doing very well on the current treatment regimen and we will plan to continue this agent going forward.     Health counseling reviewed: immunization, infection, medication side effect, exercise  Provider assessment of disease activity: 0.5  Is patient on medication for the treatment of GENE: Yes  [id number] score: 0.5         Plan:   1. Laboratory testing ordered today, if stable can wait until next visit in 6/2021 for labs.    2. No imaging is needed today.   3. No new referrals made today.  4. Medications: As listed. Changes made today: none.  5. Continue eye exam monitoring every 12 months.   Return in about 4 months (around 6/15/2021) for with me, Follow up, in person.    If there are any new questions or concerns, I would be glad to help and can be reached through our main office at [phone] or our paging  at [phone].    Alison M. Lerman, MD  Adult and Pediatric Rheumatology Fellow         Addendum:  Laboratory Investigations:   Laboratory investigations ordered today that have resulted by the time of this documentation are included below. Any pending results will be reported in a separate letter.    Office Visit on 02/15/2021   Component Date Value Ref Range Status     WBC 02/15/2021 7.8  4.0 - 11.0 10e9/L Final     RBC Count 02/15/2021 4.45  3.8 - 5.2 10e12/L Final     Hemoglobin 02/15/2021 13.3  11.7  - 15.7 g/dL Final     Hematocrit 02/15/2021 40.5  35.0 - 47.0 % Final     MCV 02/15/2021 91  78 - 100 fl Final     MCH 02/15/2021 29.9  26.5 - 33.0 pg Final     MCHC 02/15/2021 32.8  31.5 - 36.5 g/dL Final     RDW 02/15/2021 11.7  10.0 - 15.0 % Final     Platelet Count 02/15/2021 374  150 - 450 10e9/L Final     Diff Method 02/15/2021 Automated Method   Final     % Neutrophils 02/15/2021 56.0  % Final     % Lymphocytes 02/15/2021 33.7  % Final     % Monocytes 02/15/2021 7.7  % Final     % Eosinophils 02/15/2021 1.0  % Final     % Basophils 02/15/2021 1.5  % Final     % Immature Granulocytes 02/15/2021 0.1  % Final     Nucleated RBCs 02/15/2021 0  0 /100 Final     Absolute Neutrophil 02/15/2021 4.4  1.6 - 8.3 10e9/L Final     Absolute Lymphocytes 02/15/2021 2.6  0.8 - 5.3 10e9/L Final     Absolute Monocytes 02/15/2021 0.6  0.0 - 1.3 10e9/L Final     Absolute Eosinophils 02/15/2021 0.1  0.0 - 0.7 10e9/L Final     Absolute Basophils 02/15/2021 0.1  0.0 - 0.2 10e9/L Final     Abs Immature Granulocytes 02/15/2021 0.0  0 - 0.4 10e9/L Final     Absolute Nucleated RBC 02/15/2021 0.0   Final     Bilirubin Direct 02/15/2021 <0.1  0.0 - 0.2 mg/dL Final     Bilirubin Total 02/15/2021 0.2  0.2 - 1.3 mg/dL Final     Albumin 02/15/2021 4.0  3.4 - 5.0 g/dL Final     Protein Total 02/15/2021 8.3  6.8 - 8.8 g/dL Final     Alkaline Phosphatase 02/15/2021 58  40 - 150 U/L Final     ALT 02/15/2021 15  0 - 50 U/L Final     AST 02/15/2021 16  0 - 45 U/L Final     All laboratory values are within normal limits, which is excellent. We will plan to repeat labs with a lipid panel in four months pending ongoing clinical stability.     Physician Attestation   I, Vannessa Markham MD, saw this patient and agree with the findings and plan of care as documented in the note.      Items personally reviewed/procedural attestation: vitals and labs.    Vannessa Markham M.D.   of Pediatrics    Pediatric Rheumatology     CC  Patient  Care Team:  Elieser Sena MD as PCP - General (Pediatrics)  Cornelius Leonardo MD as MD (Ophthalmology)  Lerman, Alison Michelle, MD as Fellow (Rheumatology)  Vannessa Markham MD as MD (Pediatric Rheumatology)    Copy to patient  Kaci Valadezu,Delbert  [address]  [place] [address]      Alison M. Lerman, MD

## 2021-02-15 NOTE — PATIENT INSTRUCTIONS
Geri Valadez saw Dr. Alison Lerman and Dr. Vannessa Markham on February 15, 2021 for a follow up visit.    Recommendations:  1. Labs obtained today; if lab results are abnormal or require a change in the plan of care, we will contact you. If you do not hear from us, all the labs are reassuring.   2. Medications: continue Xeljanz as you are.   3. We will plan for follow-up in June, but please call if you have questions, concerns, or if your symptoms seem to be worsening      Results: Geri's lab and/or imaging results (if performed) will be mailed to you and your doctor in a formal letter summarizing this visit.  Any pending results at the time of the original note will be sent in a separate letter or relayed by phone.      Outside lab results: If you have labs done at an outside clinic as part of your follow up, please have the results faxed to us at [phone].    Thank you for allowing me to participate in Geri's care.  If there are any questions or concerns, please do not hesitate to contact us at the phone numbers below.    Alison M. Lerman, MD  Adult and Pediatric Rheumatology Fellow, PGY5    Pediatric Rheumatology Contact Information  [phone] - Nurse line: for medical questions about symptoms and medications   [phone] - Main office: for scheduling needs, refills, records requests  [phone] - Paging : for urgent after-hours needs      For Patient Education Materials:  [email or web address]            [facility] Pediatric Rhe[facility]ology    For Help:  The Pediatric Call Center at [phone] can help with scheduling of routine follow up visits.  Antonia Owens and Little Huddleston are the Nurse Coordinators for the Division of Pediatric Rheumatology and can be reached by phone at [phone] or through [facility] [email or web address]). They can help with questions about your child s rheumatic condition, medications, and test results.  For emergencies after hours or  on the weekends, please call the page  at [phone] and ask to speak to the physician on-call for Pediatric Rheumatology. Please do not use [facility] for urgent requests.  Main  Services:  [phone]  o Hmong/Mongolian/Trung: [phone]  o Kosovan: [phone]  o Greek: [phone]    Internal Referrals: If we refer your child to another physician/team within [facility], you should receive a call to set this up. If you do not hear anything within a week, please call the Call Center at [phone].    External Referrals: If we refer your child to a physician/team outside of [facility], our team will send the referral order and relevant records to them. We ask that you call the place where your child is being referred to ensure they received the needed information and notify our team coordinators if not.    Imaging: If your child needs an imaging study that is not being performed the day of your clinic appointment, please call to set this up. For xrays, ultrasounds, and echocardiogram call [phone]. For CT or MRI call [phone].     MyChart: We encourage you to sign up for [employer] at [email or web address]. For assistance or questions, call [phone]. If your child is 12 years or older, a consent for proxy/parent access needs to be signed so please discuss this with your physician at the next visit.

## 2021-05-25 DIAGNOSIS — M05.79 RHEUMATOID ARTHRITIS INVOLVING MULTIPLE SITES WITH POSITIVE RHEUMATOID FACTOR (H): Chronic | ICD-10-CM

## 2021-06-01 DIAGNOSIS — M05.79 RHEUMATOID ARTHRITIS INVOLVING MULTIPLE SITES WITH POSITIVE RHEUMATOID FACTOR (H): Primary | Chronic | ICD-10-CM

## 2021-06-07 ENCOUNTER — OFFICE VISIT (OUTPATIENT)
Dept: RHEUMATOLOGY | Facility: CLINIC | Age: 21
End: 2021-06-07
Attending: STUDENT IN AN ORGANIZED HEALTH CARE EDUCATION/TRAINING PROGRAM
Payer: COMMERCIAL

## 2021-06-07 VITALS
SYSTOLIC BLOOD PRESSURE: 117 MMHG | HEART RATE: 72 BPM | HEIGHT: 63 IN | DIASTOLIC BLOOD PRESSURE: 82 MMHG | TEMPERATURE: 98.2 F | RESPIRATION RATE: 20 BRPM | WEIGHT: 139.33 LBS | BODY MASS INDEX: 24.69 KG/M2

## 2021-06-07 DIAGNOSIS — R76.8 POSITIVE ANTINUCLEAR ANTIBODY: ICD-10-CM

## 2021-06-07 DIAGNOSIS — M05.79 RHEUMATOID ARTHRITIS INVOLVING MULTIPLE SITES WITH POSITIVE RHEUMATOID FACTOR (H): Primary | Chronic | ICD-10-CM

## 2021-06-07 DIAGNOSIS — R76.8 CYCLIC CITRULLINATED PEPTIDE (CCP) ANTIBODY POSITIVE: ICD-10-CM

## 2021-06-07 LAB
ALBUMIN SERPL-MCNC: 3.8 G/DL (ref 3.4–5)
ALP SERPL-CCNC: 54 U/L (ref 40–150)
ALT SERPL W P-5'-P-CCNC: 16 U/L (ref 0–50)
AST SERPL W P-5'-P-CCNC: 14 U/L (ref 0–45)
BASOPHILS # BLD AUTO: 0.1 10E9/L (ref 0–0.2)
BASOPHILS NFR BLD AUTO: 0.7 %
BILIRUB DIRECT SERPL-MCNC: 0.1 MG/DL (ref 0–0.2)
BILIRUB SERPL-MCNC: 0.5 MG/DL (ref 0.2–1.3)
CHOLEST SERPL-MCNC: 149 MG/DL
DIFFERENTIAL METHOD BLD: NORMAL
EOSINOPHIL # BLD AUTO: 0 10E9/L (ref 0–0.7)
EOSINOPHIL NFR BLD AUTO: 0.4 %
ERYTHROCYTE [DISTWIDTH] IN BLOOD BY AUTOMATED COUNT: 11.9 % (ref 10–15)
ERYTHROCYTE [SEDIMENTATION RATE] IN BLOOD BY WESTERGREN METHOD: 17 MM/H (ref 0–20)
HCT VFR BLD AUTO: 39.7 % (ref 35–47)
HDLC SERPL-MCNC: 75 MG/DL
HGB BLD-MCNC: 13.1 G/DL (ref 11.7–15.7)
IMM GRANULOCYTES # BLD: 0 10E9/L (ref 0–0.4)
IMM GRANULOCYTES NFR BLD: 0.2 %
LDLC SERPL CALC-MCNC: 55 MG/DL
LYMPHOCYTES # BLD AUTO: 2.3 10E9/L (ref 0.8–5.3)
LYMPHOCYTES NFR BLD AUTO: 24.6 %
MCH RBC QN AUTO: 30.3 PG (ref 26.5–33)
MCHC RBC AUTO-ENTMCNC: 33 G/DL (ref 31.5–36.5)
MCV RBC AUTO: 92 FL (ref 78–100)
MONOCYTES # BLD AUTO: 0.7 10E9/L (ref 0–1.3)
MONOCYTES NFR BLD AUTO: 7.9 %
NEUTROPHILS # BLD AUTO: 6.1 10E9/L (ref 1.6–8.3)
NEUTROPHILS NFR BLD AUTO: 66.2 %
NONHDLC SERPL-MCNC: 74 MG/DL
NRBC # BLD AUTO: 0 10*3/UL
NRBC BLD AUTO-RTO: 0 /100
PLATELET # BLD AUTO: 333 10E9/L (ref 150–450)
PROT SERPL-MCNC: 8 G/DL (ref 6.8–8.8)
RBC # BLD AUTO: 4.33 10E12/L (ref 3.8–5.2)
TRIGL SERPL-MCNC: 96 MG/DL
WBC # BLD AUTO: 9.2 10E9/L (ref 4–11)

## 2021-06-07 PROCEDURE — 80061 LIPID PANEL: CPT | Performed by: STUDENT IN AN ORGANIZED HEALTH CARE EDUCATION/TRAINING PROGRAM

## 2021-06-07 PROCEDURE — 80076 HEPATIC FUNCTION PANEL: CPT | Performed by: STUDENT IN AN ORGANIZED HEALTH CARE EDUCATION/TRAINING PROGRAM

## 2021-06-07 PROCEDURE — 85025 COMPLETE CBC W/AUTO DIFF WBC: CPT | Performed by: STUDENT IN AN ORGANIZED HEALTH CARE EDUCATION/TRAINING PROGRAM

## 2021-06-07 PROCEDURE — 85652 RBC SED RATE AUTOMATED: CPT | Performed by: STUDENT IN AN ORGANIZED HEALTH CARE EDUCATION/TRAINING PROGRAM

## 2021-06-07 PROCEDURE — 36415 COLL VENOUS BLD VENIPUNCTURE: CPT | Performed by: STUDENT IN AN ORGANIZED HEALTH CARE EDUCATION/TRAINING PROGRAM

## 2021-06-07 PROCEDURE — G0463 HOSPITAL OUTPT CLINIC VISIT: HCPCS

## 2021-06-07 PROCEDURE — 99214 OFFICE O/P EST MOD 30 MIN: CPT | Mod: GC | Performed by: STUDENT IN AN ORGANIZED HEALTH CARE EDUCATION/TRAINING PROGRAM

## 2021-06-07 ASSESSMENT — MIFFLIN-ST. JEOR: SCORE: 1372.25

## 2021-06-07 ASSESSMENT — PAIN SCALES - GENERAL: PAINLEVEL: NO PAIN (0)

## 2021-06-07 NOTE — LETTER
6/7/2021      RE: Geri THOMSON Rory  [address] [place]  [place] [address]           Rheumatology History:   Date of symptom onset: 2/14/2016  Date of first visit to center: 3/7/2016  Date of GENE diagnosis: 3/7/2016  ILAR category: polyarticular (RF-positive)  KAYLEEN Status: positive   RF Status: positive   CCP Status: positive   HLA-B27 Status: not done    Erosive disease and strongly positive rheumatoid factor and CCP antibodies.     Xeljanz therapy was started in 10/2020 after she had a flare in the late summer/fall of 2020 in her right elbow as well as several digits on both hands. When she began to flare, she had been off her previous combination therapy of Enbrel, methotrexate, and hydroxychloroquine for over a year. Desired a simplified regimen for arthritis control, so single agent Judith was chosen.        Ophthalmology History:   Iritis/Uveitis Comorbidity: no   Date of last eye exam: 8/24/2020          Medications:   As of completion of this visit:  Current Outpatient Medications   Medication Sig Dispense Refill     Multiple Vitamins-Minerals (MULTIVITAMIN PO)        Omega-3 Fatty Acids (OMEGA 3 PO)        tofacitinib (XELJANZ) 11 MG 24 hr tablet Take 1 tablet (11 mg) by mouth daily 30 tablet 3     Date of last TB Screen: 10/8/2020         Allergies:   No Known Allergies        Problem list:     Patient Active Problem List    Diagnosis Date Noted     Cyclic citrullinated peptide (CCP) antibody positive 03/07/2016     Priority: Medium     Positive antinuclear antibody 03/07/2016     Priority: Medium     High total IgG 03/07/2016     Priority: Medium     Elevated rheumatoid factor 02/24/2016     Priority: Medium     Rheumatoid arthritis involving multiple sites with positive rheumatoid factor (H) 02/24/2016     Priority: Medium     - Previous therapies: Enbrel, methotrexate, HCQ. Stopped in 2019 due to inactive disease.   - Xeljanz started 10/8/2020 after flare in R elbow and R hand.            Subjective:    Geri is a 20 year old female who was seen in Pediatric Rheumatology clinic today for follow up. Geir was last seen in our clinic on 2/15/2021 and returns today accompanied by her mother.  The primary encounter diagnosis was Rheumatoid arthritis involving multiple sites with positive rheumatoid factor (H). Diagnoses of Positive antinuclear antibody and Cyclic citrullinated peptide (CCP) antibody positive were also pertinent to this visit.      Goals for the visit include checking in on her joints and getting labs and medication refills.    Prescribed medications have been administered regularly, without missed doses.  Medications have been tolerated well, without side effects.    Geri has been doing very well. She has not had any bothersome joint pain or swelling. She has not noticed any limitation in her right elbow which was her most active joint in recent months. She has been playing tennis with her cousins and has not had any pain. She has not had any recent illnesses or infections. No rashes or skin changes. 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 She will have her last semester of college in the fall, then take the spring semester to study for her accountants examination. She does not yet know where she wants to go for a job. She may stay local or move elsewhere. We discussed that thinking about finding a rheumatologist in a new city would be a part of moving planning.     Comprehensive Review of Systems is otherwise negative.    Information per our standardized questionnaire is as below:    Self Report  Patient Pain Status: 0 (This is measured 0 = no pain, 10 = very severe pain)  Patient Global Assessment of Disease Activity: 0 (This is measured 0 = very well, 10 = very poorly)  Patient Highest Level of Education: some college or technical school     Interim Arthritis History  Morning Stiffness in the past week: no stiffness  Recent Back Pain: No    Since your last  "visit has your arthritis stopped you from trying any athletic or rigorous activities or interfaced with your ability to do these activities? No  Have you been limited your ability to do normal daily activities in the past week? No  Did you need help from other people to do normal activities in the past week? No  Have you used any aids or devices to help you do normal daily activities in the past week? No    Important Medical Events  Patient has experienced drug-related serious adverse events since last encounter?: No               Examination:   Blood pressure 117/82, pulse 72, temperature 98.2  F (36.8  C), temperature source Tympanic, resp. rate 20, height 1.61 m (5' 3.39\"), weight 63.2 kg (139 lb 5.3 oz).  Facility age limit for growth percentiles is 20 years.  Growth percentile SmartLinks can only be used for patients less than 20 years old.  Body surface area is 1.68 meters squared.     Gen: Well appearing; cooperative. No acute distress.  Head: Normal head and hair.  Eyes: No scleral injection, extraocular movements intact, PERRL.   Mouth: MMM, no oral lesions. Visible teeth appear normal.   Skin/Hair: No rashes or lesions noted on visualized skin other than scattered open and closed comedones over the face. Hair pattern on scalp appears normal.  Resp: CTA BL. No cough or wheeze appreciated.   CV: RRR, no m/r/g. No diaphoresis, no cyanosis. No lower extremity edema.   Neuro: Alert, interactive. Answers questions appropriately. Speech fluent. CN appear grossly normal. Strength and tone grossly normal.   MSK: Full active range of motion within the following areas: cervical spine, TMJ, sternoclavicular, acromioclavicular, glenohumeral, wrists, finger, sacroiliac, hip, knee, ankle, or toe joints. No joint swelling appreciated. Normal gait.     The right elbow will straighten to 180 degrees while left elbow will hyperextend an additional 5 degrees, normal/symmetric flexion as compared to other side    Positive " ASHLEY test:  No  Total active joints:  0   Total limited joints:  1  Tender entheses count:  0  SI Tenderness: No         Last Lab Results:     No visits with results within 1 Day(s) from this visit.   Latest known visit with results is:   Office Visit on 02/15/2021   Component Date Value     WBC 02/15/2021 7.8      RBC Count 02/15/2021 4.45      Hemoglobin 02/15/2021 13.3      Hematocrit 02/15/2021 40.5      MCV 02/15/2021 91      MCH 02/15/2021 29.9      MCHC 02/15/2021 32.8      RDW 02/15/2021 11.7      Platelet Count 02/15/2021 374      Diff Method 02/15/2021 Automated Method      % Neutrophils 02/15/2021 56.0      % Lymphocytes 02/15/2021 33.7      % Monocytes 02/15/2021 7.7      % Eosinophils 02/15/2021 1.0      % Basophils 02/15/2021 1.5      % Immature Granulocytes 02/15/2021 0.1      Nucleated RBCs 02/15/2021 0      Absolute Neutrophil 02/15/2021 4.4      Absolute Lymphocytes 02/15/2021 2.6      Absolute Monocytes 02/15/2021 0.6      Absolute Eosinophils 02/15/2021 0.1      Absolute Basophils 02/15/2021 0.1      Abs Immature Granulocytes 02/15/2021 0.0      Absolute Nucleated RBC 02/15/2021 0.0      Bilirubin Direct 02/15/2021 <0.1      Bilirubin Total 02/15/2021 0.2      Albumin 02/15/2021 4.0      Protein Total 02/15/2021 8.3      Alkaline Phosphatase 02/15/2021 58      ALT 02/15/2021 15      AST 02/15/2021 16             Assessment:   Geri is a 20 year old female with rheumatoid arthritis with erosive disease and strongly positive rheumatoid factor and CCP antibodies, currently well controlled on Xeljanz. Xeljanz therapy was started in 10/2020 after she had a flare in the late summer/fall of 2020 in her right elbow as well as several digits on both hands. When she began to flare, she had been off her previous combination therapy of Enbrel, methotrexate, and hydroxychloroquine for over a year. She did not want to restart a complex multidrug regimen when her joint symptoms flared. Per previous  documentation from Dr. Reese, she had been relatively well controlled from a physical exam perspective on her previous combination therapy but still had persisting laboratory abnormalities and erosive damage.  Therefore when her joints flared this fall a different therapy modality was chosen in the form of the JAK inhibitor Xeljanz.      Her right elbow will extend to 180 degrees but will not hyperextend like the left elbow. She has normal function with this elbow in her daily life as well as athletics without pain or swelling, therefore this is a functionally normal joint without signs of active disease activity. Persistent mild loss of hyperextension may be a sequelae of prior flare. No new areas of arthritis are seen on examination today. Overall, Geri is doing very well on the current treatment regimen and we will plan to continue this agent going forward.     Very mild elevation of LDL was noted on prior labs to 106, normal being <100. Lipid elevation is a side effect of Xeljanz, although it is unlikely to continue to cause elevation after the first few months of therapy. Geri's father does have hyperlipidemia. As Geri is fasting we will repeat this test today. If normal, will not likely need regular repeat assessment.       Provider assessment of disease activity: 0  (This is measured 0 = inactive 10 = highly active)  Health counseling reviewed: infection, medication side effect, treatment adherence  Is patient on medication for the treatment of GENE: Yes    Treat to Target:   [id number] score: 0  Treatment target set: Yes   Treatment target: inactive disease   Disease activity: at target - inactive disease   Physical function: at target   Use of algorithm: No          Plan:   1. Laboratory testing every 3-4 months, to monitor medications and disease activity.    2. No planned labs prior to next visit.  3. No imaging is needed today.   4. No new referrals made today.  5. Medications: As listed. Changes made  today: none.  6. Continue eye exam monitoring every 12 months, due this August 2021.   7. Return in 4 months (on 10/7/2021).     If there are any new questions or concerns, I would be glad to help and can be reached through our main office at [phone] or our paging  at [phone].    Alison M. Lerman, MD  Adult and Pediatric Rheumatology Fellow           Addendum:  Laboratory Investigations:   Laboratory investigations performed today for which results were available at the time of this note are listed below.  Pending labs will be reported in a separate letter.  No visits with results within 1 Day(s) from this visit.   Latest known visit with results is:     Office Visit on 06/07/2021   Component Date Value Ref Range Status     Cholesterol 06/07/2021 149  <200 mg/dL Final     Triglycerides 06/07/2021 96  <150 mg/dL Final     HDL Cholesterol 06/07/2021 75  >49 mg/dL Final     LDL Cholesterol Calculated 06/07/2021 55  <100 mg/dL Final    Desirable:       <100 mg/dl     Non HDL Cholesterol 06/07/2021 74  <130 mg/dL Final     WBC 06/07/2021 9.2  4.0 - 11.0 10e9/L Final     RBC Count 06/07/2021 4.33  3.8 - 5.2 10e12/L Final     Hemoglobin 06/07/2021 13.1  11.7 - 15.7 g/dL Final     Hematocrit 06/07/2021 39.7  35.0 - 47.0 % Final     MCV 06/07/2021 92  78 - 100 fl Final     MCH 06/07/2021 30.3  26.5 - 33.0 pg Final     MCHC 06/07/2021 33.0  31.5 - 36.5 g/dL Final     RDW 06/07/2021 11.9  10.0 - 15.0 % Final     Platelet Count 06/07/2021 333  150 - 450 10e9/L Final     Diff Method 06/07/2021 Automated Method   Final     % Neutrophils 06/07/2021 66.2  % Final     % Lymphocytes 06/07/2021 24.6  % Final     % Monocytes 06/07/2021 7.9  % Final     % Eosinophils 06/07/2021 0.4  % Final     % Basophils 06/07/2021 0.7  % Final     % Immature Granulocytes 06/07/2021 0.2  % Final     Nucleated RBCs 06/07/2021 0  0 /100 Final     Absolute Neutrophil 06/07/2021 6.1  1.6 - 8.3 10e9/L Final     Absolute Lymphocytes  06/07/2021 2.3  0.8 - 5.3 10e9/L Final     Absolute Monocytes 06/07/2021 0.7  0.0 - 1.3 10e9/L Final     Absolute Eosinophils 06/07/2021 0.0  0.0 - 0.7 10e9/L Final     Absolute Basophils 06/07/2021 0.1  0.0 - 0.2 10e9/L Final     Abs Immature Granulocytes 06/07/2021 0.0  0 - 0.4 10e9/L Final     Absolute Nucleated RBC 06/07/2021 0.0   Final     Bilirubin Direct 06/07/2021 0.1  0.0 - 0.2 mg/dL Final     Bilirubin Total 06/07/2021 0.5  0.2 - 1.3 mg/dL Final     Albumin 06/07/2021 3.8  3.4 - 5.0 g/dL Final     Protein Total 06/07/2021 8.0  6.8 - 8.8 g/dL Final     Alkaline Phosphatase 06/07/2021 54  40 - 150 U/L Final     ALT 06/07/2021 16  0 - 50 U/L Final     AST 06/07/2021 14  0 - 45 U/L Final     Sed Rate 06/07/2021 17  0 - 20 mm/h Final     The above laboratory tests are all within normal limits, including the LDL cholesterol that was mildly elevated at last visit. There are no changes to the plan of care based on the above results.     Physician Attestation   I, Vannessa Markham MD, saw this patient and agree with the findings and plan of care as documented in the note.      Items personally reviewed/procedural attestation: vitals and labs.    Review of the result(s) of each unique test - labs  Ordering of each unique test  Prescription drug management    Vannessa Markham M.D.   of Pediatrics    Pediatric Rheumatology       CC  Patient Care Team:  Elieser Sena MD as PCP - General (Pediatrics)  Cornelius Leonardo MD as MD (Ophthalmology)  Vannessa Markham MD as MD (Pediatric Rheumatology)  Hardy Reese MD as Assigned Pediatric Specialist Provider      Copy to patient  RoryKaci,Delbert  [address]  [place] [address]

## 2021-06-07 NOTE — NURSING NOTE
Peds Outpatient BP  1) Rested for 5 minutes, BP taken on bare arm, patient sitting (or supine for infants) w/ legs uncrossed?   Yes  2) Right arm used?  Right arm   Yes  3) Arm circumference of largest part of upper arm (in cm): 26.5  4) BP cuff sized used: Adult (25-32cm)   If used different size cuff then what was recommended why? N/A  5) First BP reading:machine   BP Readings from Last 1 Encounters:   06/07/21 117/82      Is reading >90%? Patient over age 18.   (90% for <1 years is 90/50)  (90% for >18 years is 140/90)  *If a machine BP is at or above 90% take manual BP  6) Manual BP reading: N/A  7) Other comments: Elier Quinn, CMA.

## 2021-06-07 NOTE — PATIENT INSTRUCTIONS
Geri Valadez saw Dr. Alison Lerman and Dr. Vannessa Markham on June 7, 2021 for an initial evaluation/follow up visit.    Recommendations:  1. Labs obtained today; if lab results are abnormal or require a change in the plan of care, we will contact you. If you do not hear from us, all the labs are reassuring.   2. Medications: continue as prescribed   3. Next visit with us in four months, ~10/7/21.      Results: Geri's lab and/or imaging results (if performed) will be mailed to you and your doctor in a formal letter summarizing this visit.  Any pending results at the time of the original note will be sent in a separate letter or relayed by phone.      Outside lab results: If you have labs done at an outside clinic as part of your follow up, please have the results faxed to us at [phone].    Thank you for allowing me to participate in Jhons care.  If there are any questions or concerns, please do not hesitate to contact us at the phone numbers below.    Alison M. Lerman, MD  Adult and Pediatric Rheumatology Fellow, PGY5    Pediatric Rheumatology Contact Information  [phone] - Nurse line: for medical questions about symptoms and medications   [phone] - Main office: for scheduling needs, refills, records requests  [phone] - Paging : for urgent after-hours needs          For Patient Education Materials:  [email or web address]       [facility] Pediatric Rheumatology    For Help:  The Pediatric Call Center at [phone] can help with scheduling of routine follow up visits.  Antonia Owens and Little Huddleston are the Nurse Coordinators for the Division of Pediatric Rheumatology and can be reached by phone at [phone] or through [employer] [email or web address]). They can help with questions about your child s rheumatic condition, medications, and test results.  For emergencies after hours or on the weekends, please call the page  at [phone] and ask to  speak to the physician on-call for Pediatric Rheumatology. Please do not use [employer] for urgent requests.  Main  Services:  [phone]  o Hmong/[place]/Citizen of [place]: [phone]  o Salvadorean: [phone]  o Maltese: [phone]    Internal Referrals: If we refer your child to another physician/team within [facility], you should receive a call to set this up. If you do not hear anything within a week, please call the Call Center at [phone].    External Referrals: If we refer your child to a physician/team outside of [facility], our team will send the referral order and relevant records to them. We ask that you call the place where your child is being referred to ensure they received the needed information and notify our team coordinators if not.    Imaging: If your child needs an imaging study that is not being performed the day of your clinic appointment, please call to set this up. For xrays, ultrasounds, and echocardiogram call [phone]. For CT or MRI call [phone].     MyChart: We encourage you to sign up for MyChart at [email or web address]. For assistance or questions, call [phone]. If your child is 12 years or older, a consent for proxy/parent access needs to be signed so please discuss this with your physician at the next visit.

## 2021-06-07 NOTE — NURSING NOTE
"Chief Complaint   Patient presents with     Arthritis     Arthritis.     Vitals:    06/07/21 1237   BP: 117/82   BP Location: Right arm   Patient Position: Chair   Pulse: 72   Resp: 20   Temp: 98.2  F (36.8  C)   TempSrc: Tympanic   Weight: 139 lb 5.3 oz (63.2 kg)   Height: 5' 3.39\" (161 cm)           Amberly Quinn M.A.    June 7, 2021  "

## 2021-06-07 NOTE — PROGRESS NOTES
Rheumatology History:   Date of symptom onset: 2/14/2016  Date of first visit to center: 3/7/2016  Date of GENE diagnosis: 3/7/2016  ILAR category: polyarticular (RF-positive)  KAYLEEN Status: positive   RF Status: positive   CCP Status: positive   HLA-B27 Status: not done    Erosive disease and strongly positive rheumatoid factor and CCP antibodies.     Xeljanz therapy was started in 10/2020 after she had a flare in the late summer/fall of 2020 in her right elbow as well as several digits on both hands. When she began to flare, she had been off her previous combination therapy of Enbrel, methotrexate, and hydroxychloroquine for over a year. Desired a simplified regimen for arthritis control, so single agent Judith was chosen.        Ophthalmology History:   Iritis/Uveitis Comorbidity: no   Date of last eye exam: 8/24/2020          Medications:   As of completion of this visit:  Current Outpatient Medications   Medication Sig Dispense Refill     Multiple Vitamins-Minerals (MULTIVITAMIN PO)        Omega-3 Fatty Acids (OMEGA 3 PO)        tofacitinib (XELJANZ) 11 MG 24 hr tablet Take 1 tablet (11 mg) by mouth daily 30 tablet 3     Date of last TB Screen: 10/8/2020         Allergies:   No Known Allergies        Problem list:     Patient Active Problem List    Diagnosis Date Noted     Cyclic citrullinated peptide (CCP) antibody positive 03/07/2016     Priority: Medium     Positive antinuclear antibody 03/07/2016     Priority: Medium     High total IgG 03/07/2016     Priority: Medium     Elevated rheumatoid factor 02/24/2016     Priority: Medium     Rheumatoid arthritis involving multiple sites with positive rheumatoid factor (H) 02/24/2016     Priority: Medium     - Previous therapies: Enbrel, methotrexate, HCQ. Stopped in 2019 due to inactive disease.   - Xeljanz started 10/8/2020 after flare in R elbow and R hand.            Subjective:   Geri is a 20 year old female who was seen in Pediatric Rheumatology clinic today  for follow up. Geri was last seen in our clinic on 2/15/2021 and returns today accompanied by her mother.  The primary encounter diagnosis was Rheumatoid arthritis involving multiple sites with positive rheumatoid factor (H). Diagnoses of Positive antinuclear antibody and Cyclic citrullinated peptide (CCP) antibody positive were also pertinent to this visit.      Goals for the visit include checking in on her joints and getting labs and medication refills.    Prescribed medications have been administered regularly, without missed doses.  Medications have been tolerated well, without side effects.    Geri has been doing very well. She has not had any bothersome joint pain or swelling. She has not noticed any limitation in her right elbow which was her most active joint in recent months. She has been playing tennis with her cousins and has not had any pain. She has not had any recent illnesses or infections. No rashes or skin changes. 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 She will have her last semester of college in the fall, then take the spring semester to study for her accountants examination. She does not yet know where she wants to go for a job. She may stay local or move elsewhere. We discussed that thinking about finding a rheumatologist in a new city would be a part of moving planning.     Comprehensive Review of Systems is otherwise negative.    Information per our standardized questionnaire is as below:    Self Report  Patient Pain Status: 0 (This is measured 0 = no pain, 10 = very severe pain)  Patient Global Assessment of Disease Activity: 0 (This is measured 0 = very well, 10 = very poorly)  Patient Highest Level of Education: some college or technical school     Interim Arthritis History  Morning Stiffness in the past week: no stiffness  Recent Back Pain: No    Since your last visit has your arthritis stopped you from trying any athletic or rigorous activities  "or interfaced with your ability to do these activities? No  Have you been limited your ability to do normal daily activities in the past week? No  Did you need help from other people to do normal activities in the past week? No  Have you used any aids or devices to help you do normal daily activities in the past week? No    Important Medical Events  Patient has experienced drug-related serious adverse events since last encounter?: No               Examination:   Blood pressure 117/82, pulse 72, temperature 98.2  F (36.8  C), temperature source Tympanic, resp. rate 20, height 1.61 m (5' 3.39\"), weight 63.2 kg (139 lb 5.3 oz).  Facility age limit for growth percentiles is 20 years.  Growth percentile SmartLinks can only be used for patients less than 20 years old.  Body surface area is 1.68 meters squared.     Gen: Well appearing; cooperative. No acute distress.  Head: Normal head and hair.  Eyes: No scleral injection, extraocular movements intact, PERRL.   Mouth: MMM, no oral lesions. Visible teeth appear normal.   Skin/Hair: No rashes or lesions noted on visualized skin other than scattered open and closed comedones over the face. Hair pattern on scalp appears normal.  Resp: CTA BL. No cough or wheeze appreciated.   CV: RRR, no m/r/g. No diaphoresis, no cyanosis. No lower extremity edema.   Neuro: Alert, interactive. Answers questions appropriately. Speech fluent. CN appear grossly normal. Strength and tone grossly normal.   MSK: Full active range of motion within the following areas: cervical spine, TMJ, sternoclavicular, acromioclavicular, glenohumeral, wrists, finger, sacroiliac, hip, knee, ankle, or toe joints. No joint swelling appreciated. Normal gait.     The right elbow will straighten to 180 degrees while left elbow will hyperextend an additional 5 degrees, normal/symmetric flexion as compared to other side    Positive ASHLEY test:  No  Total active joints:  0   Total limited joints:  1  Tender entheses " count:  0  SI Tenderness: No         Last Lab Results:     No visits with results within 1 Day(s) from this visit.   Latest known visit with results is:   Office Visit on 02/15/2021   Component Date Value     WBC 02/15/2021 7.8      RBC Count 02/15/2021 4.45      Hemoglobin 02/15/2021 13.3      Hematocrit 02/15/2021 40.5      MCV 02/15/2021 91      MCH 02/15/2021 29.9      MCHC 02/15/2021 32.8      RDW 02/15/2021 11.7      Platelet Count 02/15/2021 374      Diff Method 02/15/2021 Automated Method      % Neutrophils 02/15/2021 56.0      % Lymphocytes 02/15/2021 33.7      % Monocytes 02/15/2021 7.7      % Eosinophils 02/15/2021 1.0      % Basophils 02/15/2021 1.5      % Immature Granulocytes 02/15/2021 0.1      Nucleated RBCs 02/15/2021 0      Absolute Neutrophil 02/15/2021 4.4      Absolute Lymphocytes 02/15/2021 2.6      Absolute Monocytes 02/15/2021 0.6      Absolute Eosinophils 02/15/2021 0.1      Absolute Basophils 02/15/2021 0.1      Abs Immature Granulocytes 02/15/2021 0.0      Absolute Nucleated RBC 02/15/2021 0.0      Bilirubin Direct 02/15/2021 <0.1      Bilirubin Total 02/15/2021 0.2      Albumin 02/15/2021 4.0      Protein Total 02/15/2021 8.3      Alkaline Phosphatase 02/15/2021 58      ALT 02/15/2021 15      AST 02/15/2021 16             Assessment:   Geri is a 20 year old female with rheumatoid arthritis with erosive disease and strongly positive rheumatoid factor and CCP antibodies, currently well controlled on Xeljanz. Xeljanz therapy was started in 10/2020 after she had a flare in the late summer/fall of 2020 in her right elbow as well as several digits on both hands. When she began to flare, she had been off her previous combination therapy of Enbrel, methotrexate, and hydroxychloroquine for over a year. She did not want to restart a complex multidrug regimen when her joint symptoms flared. Per previous documentation from Dr. Reese, she had been relatively well controlled from a physical exam  perspective on her previous combination therapy but still had persisting laboratory abnormalities and erosive damage.  Therefore when her joints flared this fall a different therapy modality was chosen in the form of the JAK inhibitor Xeljanz.      Her right elbow will extend to 180 degrees but will not hyperextend like the left elbow. She has normal function with this elbow in her daily life as well as athletics without pain or swelling, therefore this is a functionally normal joint without signs of active disease activity. Persistent mild loss of hyperextension may be a sequelae of prior flare. No new areas of arthritis are seen on examination today. Overall, Geri is doing very well on the current treatment regimen and we will plan to continue this agent going forward.     Very mild elevation of LDL was noted on prior labs to 106, normal being <100. Lipid elevation is a side effect of Xeljanz, although it is unlikely to continue to cause elevation after the first few months of therapy. Geri's father does have hyperlipidemia. As Grei is fasting we will repeat this test today. If normal, will not likely need regular repeat assessment.       Provider assessment of disease activity: 0  (This is measured 0 = inactive 10 = highly active)  Health counseling reviewed: infection, medication side effect, treatment adherence  Is patient on medication for the treatment of GENE: Yes    Treat to Target:   [id number] score: 0  Treatment target set: Yes   Treatment target: inactive disease   Disease activity: at target - inactive disease   Physical function: at target   Use of algorithm: No          Plan:   1. Laboratory testing every 3-4 months, to monitor medications and disease activity.    2. No planned labs prior to next visit.  3. No imaging is needed today.   4. No new referrals made today.  5. Medications: As listed. Changes made today: none.  6. Continue eye exam monitoring every 12 months, due this August 2021.    7. Return in 4 months (on 10/7/2021).     If there are any new questions or concerns, I would be glad to help and can be reached through our main office at [phone] or our paging  at [phone].    Alison M. Lerman, MD  Adult and Pediatric Rheumatology Fellow           Addendum:  Laboratory Investigations:   Laboratory investigations performed today for which results were available at the time of this note are listed below.  Pending labs will be reported in a separate letter.  No visits with results within 1 Day(s) from this visit.   Latest known visit with results is:     Office Visit on 06/07/2021   Component Date Value Ref Range Status     Cholesterol 06/07/2021 149  <200 mg/dL Final     Triglycerides 06/07/2021 96  <150 mg/dL Final     HDL Cholesterol 06/07/2021 75  >49 mg/dL Final     LDL Cholesterol Calculated 06/07/2021 55  <100 mg/dL Final    Desirable:       <100 mg/dl     Non HDL Cholesterol 06/07/2021 74  <130 mg/dL Final     WBC 06/07/2021 9.2  4.0 - 11.0 10e9/L Final     RBC Count 06/07/2021 4.33  3.8 - 5.2 10e12/L Final     Hemoglobin 06/07/2021 13.1  11.7 - 15.7 g/dL Final     Hematocrit 06/07/2021 39.7  35.0 - 47.0 % Final     MCV 06/07/2021 92  78 - 100 fl Final     MCH 06/07/2021 30.3  26.5 - 33.0 pg Final     MCHC 06/07/2021 33.0  31.5 - 36.5 g/dL Final     RDW 06/07/2021 11.9  10.0 - 15.0 % Final     Platelet Count 06/07/2021 333  150 - 450 10e9/L Final     Diff Method 06/07/2021 Automated Method   Final     % Neutrophils 06/07/2021 66.2  % Final     % Lymphocytes 06/07/2021 24.6  % Final     % Monocytes 06/07/2021 7.9  % Final     % Eosinophils 06/07/2021 0.4  % Final     % Basophils 06/07/2021 0.7  % Final     % Immature Granulocytes 06/07/2021 0.2  % Final     Nucleated RBCs 06/07/2021 0  0 /100 Final     Absolute Neutrophil 06/07/2021 6.1  1.6 - 8.3 10e9/L Final     Absolute Lymphocytes 06/07/2021 2.3  0.8 - 5.3 10e9/L Final     Absolute Monocytes 06/07/2021 0.7  0.0 - 1.3  10e9/L Final     Absolute Eosinophils 06/07/2021 0.0  0.0 - 0.7 10e9/L Final     Absolute Basophils 06/07/2021 0.1  0.0 - 0.2 10e9/L Final     Abs Immature Granulocytes 06/07/2021 0.0  0 - 0.4 10e9/L Final     Absolute Nucleated RBC 06/07/2021 0.0   Final     Bilirubin Direct 06/07/2021 0.1  0.0 - 0.2 mg/dL Final     Bilirubin Total 06/07/2021 0.5  0.2 - 1.3 mg/dL Final     Albumin 06/07/2021 3.8  3.4 - 5.0 g/dL Final     Protein Total 06/07/2021 8.0  6.8 - 8.8 g/dL Final     Alkaline Phosphatase 06/07/2021 54  40 - 150 U/L Final     ALT 06/07/2021 16  0 - 50 U/L Final     AST 06/07/2021 14  0 - 45 U/L Final     Sed Rate 06/07/2021 17  0 - 20 mm/h Final     The above laboratory tests are all within normal limits, including the LDL cholesterol that was mildly elevated at last visit. There are no changes to the plan of care based on the above results.     Physician Attestation   I, Vannessa Markham MD, saw this patient and agree with the findings and plan of care as documented in the note.      Items personally reviewed/procedural attestation: vitals and labs.    Review of the result(s) of each unique test - labs  Ordering of each unique test  Prescription drug management    Vannessa Markham M.D.   of Pediatrics    Pediatric Rheumatology       CC  Patient Care Team:  Elieser Sena MD as PCP - General (Pediatrics)  Cornelius Leonardo MD as MD (Ophthalmology)  Lerman, Alison Michelle, MD as Fellow (Rheumatology)  Vannessa Markham MD as MD (Pediatric Rheumatology)  Hardy Reese MD as Assigned Pediatric Specialist Provider  SELF, REFERRED    Copy to patient  Kaci Valadez Michael  [id number] [facility] [id number]

## 2021-06-07 NOTE — LETTER
6/7/2021      RE: Geri THOMSON Rory  [address]  [place] [address]           Rheumatology History:   Date of symptom onset: 2/14/2016  Date of first visit to center: 3/7/2016  Date of GENE diagnosis: 3/7/2016  ILAR category: polyarticular (RF-positive)  KAYLEEN Status: positive   RF Status: positive   CCP Status: positive   HLA-B27 Status: not done    Erosive disease and strongly positive rheumatoid factor and CCP antibodies.     Xeljanz therapy was started in 10/2020 after she had a flare in the late summer/fall of 2020 in her right elbow as well as several digits on both hands. When she began to flare, she had been off her previous combination therapy of Enbrel, methotrexate, and hydroxychloroquine for over a year. Desired a simplified regimen for arthritis control, so single agent Judith was chosen.        Ophthalmology History:   Iritis/Uveitis Comorbidity: no   Date of last eye exam: 8/24/2020          Medications:   As of completion of this visit:  Current Outpatient Medications   Medication Sig Dispense Refill     Multiple Vitamins-Minerals (MULTIVITAMIN PO)        Omega-3 Fatty Acids (OMEGA 3 PO)        tofacitinib (XELJANZ) 11 MG 24 hr tablet Take 1 tablet (11 mg) by mouth daily 30 tablet 3     Date of last TB Screen: 10/8/2020         Allergies:   No Known Allergies        Problem list:     Patient Active Problem List    Diagnosis Date Noted     Cyclic citrullinated peptide (CCP) antibody positive 03/07/2016     Priority: Medium     Positive antinuclear antibody 03/07/2016     Priority: Medium     High total IgG 03/07/2016     Priority: Medium     Elevated rheumatoid factor 02/24/2016     Priority: Medium     Rheumatoid arthritis involving multiple sites with positive rheumatoid factor (H) 02/24/2016     Priority: Medium     - Previous therapies: Enbrel, methotrexate, HCQ. Stopped in 2019 due to inactive disease.   - Xeljanz started 10/8/2020 after flare in R elbow and R hand.            Subjective:    Geri is a 20 year old female who was seen in Pediatric Rheumatology clinic today for follow up. Geri was last seen in our clinic on 2/15/2021 and returns today accompanied by her mother.  The primary encounter diagnosis was Rheumatoid arthritis involving multiple sites with positive rheumatoid factor (H). Diagnoses of Positive antinuclear antibody and Cyclic citrullinated peptide (CCP) antibody positive were also pertinent to this visit.      Goals for the visit include checking in on her joints and getting labs and medication refills.    Prescribed medications have been administered regularly, without missed doses.  Medications have been tolerated well, without side effects.    Geri has been doing very well. She has not had any bothersome joint pain or swelling. She has not noticed any limitation in her right elbow which was her most active joint in recent months. She has been playing tennis with her cousins and has not had any pain. She has not had any recent illnesses or infections. No rashes or skin changes. 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 She will have her last semester of college in the fall, then take the spring semester to study for her accountants examination. She does not yet know where she wants to go for a job. She may stay local or move elsewhere. We discussed that thinking about finding a rheumatologist in a new city would be a part of moving planning.     Comprehensive Review of Systems is otherwise negative.    Information per our standardized questionnaire is as below:    Self Report  Patient Pain Status: 0 (This is measured 0 = no pain, 10 = very severe pain)  Patient Global Assessment of Disease Activity: 0 (This is measured 0 = very well, 10 = very poorly)  Patient Highest Level of Education: some college or technical school     Interim Arthritis History  Morning Stiffness in the past week: no stiffness  Recent Back Pain: No    Since your last  "visit has your arthritis stopped you from trying any athletic or rigorous activities or interfaced with your ability to do these activities? No  Have you been limited your ability to do normal daily activities in the past week? No  Did you need help from other people to do normal activities in the past week? No  Have you used any aids or devices to help you do normal daily activities in the past week? No    Important Medical Events  Patient has experienced drug-related serious adverse events since last encounter?: No               Examination:   Blood pressure 117/82, pulse 72, temperature 98.2  F (36.8  C), temperature source Tympanic, resp. rate 20, height 1.61 m (5' 3.39\"), weight 63.2 kg (139 lb 5.3 oz).  Facility age limit for growth percentiles is 20 years.  Growth percentile SmartLinks can only be used for patients less than 20 years old.  Body surface area is 1.68 meters squared.     Gen: Well appearing; cooperative. No acute distress.  Head: Normal head and hair.  Eyes: No scleral injection, extraocular movements intact, PERRL.   Mouth: MMM, no oral lesions. Visible teeth appear normal.   Skin/Hair: No rashes or lesions noted on visualized skin other than scattered open and closed comedones over the face. Hair pattern on scalp appears normal.  Resp: CTA BL. No cough or wheeze appreciated.   CV: RRR, no m/r/g. No diaphoresis, no cyanosis. No lower extremity edema.   Neuro: Alert, interactive. Answers questions appropriately. Speech fluent. CN appear grossly normal. Strength and tone grossly normal.   MSK: Full active range of motion within the following areas: cervical spine, TMJ, sternoclavicular, acromioclavicular, glenohumeral, wrists, finger, sacroiliac, hip, knee, ankle, or toe joints. No joint swelling appreciated. Normal gait.     The right elbow will straighten to 180 degrees while left elbow will hyperextend an additional 5 degrees, normal/symmetric flexion as compared to other side    Positive " ASHLEY test:  No  Total active joints:  0   Total limited joints:  1  Tender entheses count:  0  SI Tenderness: No         Last Lab Results:     No visits with results within 1 Day(s) from this visit.   Latest known visit with results is:   Office Visit on 02/15/2021   Component Date Value     WBC 02/15/2021 7.8      RBC Count 02/15/2021 4.45      Hemoglobin 02/15/2021 13.3      Hematocrit 02/15/2021 40.5      MCV 02/15/2021 91      MCH 02/15/2021 29.9      MCHC 02/15/2021 32.8      RDW 02/15/2021 11.7      Platelet Count 02/15/2021 374      Diff Method 02/15/2021 Automated Method      % Neutrophils 02/15/2021 56.0      % Lymphocytes 02/15/2021 33.7      % Monocytes 02/15/2021 7.7      % Eosinophils 02/15/2021 1.0      % Basophils 02/15/2021 1.5      % Immature Granulocytes 02/15/2021 0.1      Nucleated RBCs 02/15/2021 0      Absolute Neutrophil 02/15/2021 4.4      Absolute Lymphocytes 02/15/2021 2.6      Absolute Monocytes 02/15/2021 0.6      Absolute Eosinophils 02/15/2021 0.1      Absolute Basophils 02/15/2021 0.1      Abs Immature Granulocytes 02/15/2021 0.0      Absolute Nucleated RBC 02/15/2021 0.0      Bilirubin Direct 02/15/2021 <0.1      Bilirubin Total 02/15/2021 0.2      Albumin 02/15/2021 4.0      Protein Total 02/15/2021 8.3      Alkaline Phosphatase 02/15/2021 58      ALT 02/15/2021 15      AST 02/15/2021 16             Assessment:   Geri is a 20 year old female with rheumatoid arthritis with erosive disease and strongly positive rheumatoid factor and CCP antibodies, currently well controlled on Xeljanz. Xeljanz therapy was started in 10/2020 after she had a flare in the late summer/fall of 2020 in her right elbow as well as several digits on both hands. When she began to flare, she had been off her previous combination therapy of Enbrel, methotrexate, and hydroxychloroquine for over a year. She did not want to restart a complex multidrug regimen when her joint symptoms flared. Per previous  documentation from Dr. Reese, she had been relatively well controlled from a physical exam perspective on her previous combination therapy but still had persisting laboratory abnormalities and erosive damage.  Therefore when her joints flared this fall a different therapy modality was chosen in the form of the JAK inhibitor Xeljanz.      Her right elbow will extend to 180 degrees but will not hyperextend like the left elbow. She has normal function with this elbow in her daily life as well as athletics without pain or swelling, therefore this is a functionally normal joint without signs of active disease activity. Persistent mild loss of hyperextension may be a sequelae of prior flare. No new areas of arthritis are seen on examination today. Overall, Geri is doing very well on the current treatment regimen and we will plan to continue this agent going forward.     Very mild elevation of LDL was noted on prior labs to 106, normal being <100. Lipid elevation is a side effect of Xeljanz, although it is unlikely to continue to cause elevation after the first few months of therapy. Geri's father does have hyperlipidemia. As Geri is fasting we will repeat this test today. If normal, will not likely need regular repeat assessment.       Provider assessment of disease activity: 0  (This is measured 0 = inactive 10 = highly active)  Health counseling reviewed: infection, medication side effect, treatment adherence  Is patient on medication for the treatment of GENE: Yes    Treat to Target:   [id number] score: 0  Treatment target set: Yes   Treatment target: inactive disease   Disease activity: at target - inactive disease   Physical function: at target   Use of algorithm: No          Plan:   1. Laboratory testing every 3-4 months, to monitor medications and disease activity.    2. No planned labs prior to next visit.  3. No imaging is needed today.   4. No new referrals made today.  5. Medications: As listed. Changes made  today: none.  6. Continue eye exam monitoring every 12 months, due this August 2021.   7. Return in 4 months (on 10/7/2021).     If there are any new questions or concerns, I would be glad to help and can be reached through our main office at [phone] or our paging  at [phone].    Alison M. Lerman, MD  Adult and Pediatric Rheumatology Fellow           Addendum:  Laboratory Investigations:   Laboratory investigations performed today for which results were available at the time of this note are listed below.  Pending labs will be reported in a separate letter.  No visits with results within 1 Day(s) from this visit.   Latest known visit with results is:     Office Visit on 06/07/2021   Component Date Value Ref Range Status     Cholesterol 06/07/2021 149  <200 mg/dL Final     Triglycerides 06/07/2021 96  <150 mg/dL Final     HDL Cholesterol 06/07/2021 75  >49 mg/dL Final     LDL Cholesterol Calculated 06/07/2021 55  <100 mg/dL Final    Desirable:       <100 mg/dl     Non HDL Cholesterol 06/07/2021 74  <130 mg/dL Final     WBC 06/07/2021 9.2  4.0 - 11.0 10e9/L Final     RBC Count 06/07/2021 4.33  3.8 - 5.2 10e12/L Final     Hemoglobin 06/07/2021 13.1  11.7 - 15.7 g/dL Final     Hematocrit 06/07/2021 39.7  35.0 - 47.0 % Final     MCV 06/07/2021 92  78 - 100 fl Final     MCH 06/07/2021 30.3  26.5 - 33.0 pg Final     MCHC 06/07/2021 33.0  31.5 - 36.5 g/dL Final     RDW 06/07/2021 11.9  10.0 - 15.0 % Final     Platelet Count 06/07/2021 333  150 - 450 10e9/L Final     Diff Method 06/07/2021 Automated Method   Final     % Neutrophils 06/07/2021 66.2  % Final     % Lymphocytes 06/07/2021 24.6  % Final     % Monocytes 06/07/2021 7.9  % Final     % Eosinophils 06/07/2021 0.4  % Final     % Basophils 06/07/2021 0.7  % Final     % Immature Granulocytes 06/07/2021 0.2  % Final     Nucleated RBCs 06/07/2021 0  0 /100 Final     Absolute Neutrophil 06/07/2021 6.1  1.6 - 8.3 10e9/L Final     Absolute Lymphocytes  06/07/2021 2.3  0.8 - 5.3 10e9/L Final     Absolute Monocytes 06/07/2021 0.7  0.0 - 1.3 10e9/L Final     Absolute Eosinophils 06/07/2021 0.0  0.0 - 0.7 10e9/L Final     Absolute Basophils 06/07/2021 0.1  0.0 - 0.2 10e9/L Final     Abs Immature Granulocytes 06/07/2021 0.0  0 - 0.4 10e9/L Final     Absolute Nucleated RBC 06/07/2021 0.0   Final     Bilirubin Direct 06/07/2021 0.1  0.0 - 0.2 mg/dL Final     Bilirubin Total 06/07/2021 0.5  0.2 - 1.3 mg/dL Final     Albumin 06/07/2021 3.8  3.4 - 5.0 g/dL Final     Protein Total 06/07/2021 8.0  6.8 - 8.8 g/dL Final     Alkaline Phosphatase 06/07/2021 54  40 - 150 U/L Final     ALT 06/07/2021 16  0 - 50 U/L Final     AST 06/07/2021 14  0 - 45 U/L Final     Sed Rate 06/07/2021 17  0 - 20 mm/h Final     The above laboratory tests are all within normal limits, including the LDL cholesterol that was mildly elevated at last visit. There are no changes to the plan of care based on the above results.     Physician Attestation   I, Vannessa Markham MD, saw this patient and agree with the findings and plan of care as documented in the note.      Items personally reviewed/procedural attestation: vitals and labs.    Review of the result(s) of each unique test - labs  Ordering of each unique test  Prescription drug management    Vannessa Markham M.D.   of Pediatrics    Pediatric Rheumatology       CC  Patient Care Team:  Elieser Sena MD as PCP - General (Pediatrics)  Cornelius Leonardo MD as MD (Ophthalmology)  Lerman, Alison Michelle, MD as Fellow (Rheumatology)  Vannessa Markham MD as MD (Pediatric Rheumatology)  Hardy Reese MD as Assigned Pediatric Specialist Provider  SELF, REFERRED    Copy to patient  Rory,Kaci Rory,Delbert  [address] [facility] [address]      Alison M. Lerman, MD

## 2021-09-18 ENCOUNTER — HEALTH MAINTENANCE LETTER (OUTPATIENT)
Age: 21
End: 2021-09-18

## 2021-09-23 ENCOUNTER — TELEPHONE (OUTPATIENT)
Dept: RHEUMATOLOGY | Facility: CLINIC | Age: 21
End: 2021-09-23

## 2021-09-23 NOTE — TELEPHONE ENCOUNTER
I returned a call to dad (Delbert). He had called wanting to know why he is receiving a bill for the February and June visit with Dr. Lerman. Geri had previously been seen by Dr. Reese, who recommended that patient keep her care in the [facility] system and see Dr. Lerman. I called [facility] billing on patient's behalf and spoke to a representative advised that family call their insurance to find out the details.     I talked to dad again and explained that he will need to call [employer]. Perhaps they have not met their deductible for the year? Or it could be a co-insurance?     Geri has an upcoming appointment on Oct 4th with Dr. Lerman, dad wonders whether they should keep it. I recommend that keep the appt but told him he should call his insurance to find out the details of why they are receiving bills for their visits.  They can bring this up with Dr. Lerman at their next visit, if cost is an issue, perhaps they can discuss spacing out her follow up visit, if she is doing well, but again, they will need to check with insurance and discuss their options with Dr. Lerman.

## 2021-10-04 ENCOUNTER — OFFICE VISIT (OUTPATIENT)
Dept: RHEUMATOLOGY | Facility: CLINIC | Age: 21
End: 2021-10-04
Attending: PEDIATRICS
Payer: COMMERCIAL

## 2021-10-04 VITALS
WEIGHT: 135.36 LBS | TEMPERATURE: 98 F | DIASTOLIC BLOOD PRESSURE: 80 MMHG | HEIGHT: 63 IN | BODY MASS INDEX: 23.98 KG/M2 | RESPIRATION RATE: 16 BRPM | HEART RATE: 64 BPM | SYSTOLIC BLOOD PRESSURE: 122 MMHG

## 2021-10-04 DIAGNOSIS — Z51.81 THERAPEUTIC DRUG MONITORING: ICD-10-CM

## 2021-10-04 DIAGNOSIS — M05.79 RHEUMATOID ARTHRITIS INVOLVING MULTIPLE SITES WITH POSITIVE RHEUMATOID FACTOR (H): Primary | ICD-10-CM

## 2021-10-04 LAB
ALBUMIN SERPL-MCNC: 3.8 G/DL (ref 3.4–5)
ALP SERPL-CCNC: 49 U/L (ref 40–150)
ALT SERPL W P-5'-P-CCNC: 19 U/L (ref 0–50)
AST SERPL W P-5'-P-CCNC: 11 U/L (ref 0–45)
BASOPHILS # BLD AUTO: 0.1 10E3/UL (ref 0–0.2)
BASOPHILS NFR BLD AUTO: 1 %
BILIRUB DIRECT SERPL-MCNC: 0.1 MG/DL (ref 0–0.2)
BILIRUB SERPL-MCNC: 0.3 MG/DL (ref 0.2–1.3)
EOSINOPHIL # BLD AUTO: 0.1 10E3/UL (ref 0–0.7)
EOSINOPHIL NFR BLD AUTO: 1 %
ERYTHROCYTE [DISTWIDTH] IN BLOOD BY AUTOMATED COUNT: 12.8 % (ref 10–15)
HCT VFR BLD AUTO: 39.9 % (ref 35–47)
HGB BLD-MCNC: 12.8 G/DL (ref 11.7–15.7)
IMM GRANULOCYTES # BLD: 0 10E3/UL
IMM GRANULOCYTES NFR BLD: 0 %
LYMPHOCYTES # BLD AUTO: 2.4 10E3/UL (ref 0.8–5.3)
LYMPHOCYTES NFR BLD AUTO: 24 %
MCH RBC QN AUTO: 30.2 PG (ref 26.5–33)
MCHC RBC AUTO-ENTMCNC: 32.1 G/DL (ref 31.5–36.5)
MCV RBC AUTO: 94 FL (ref 78–100)
MONOCYTES # BLD AUTO: 0.6 10E3/UL (ref 0–1.3)
MONOCYTES NFR BLD AUTO: 6 %
NEUTROPHILS # BLD AUTO: 6.8 10E3/UL (ref 1.6–8.3)
NEUTROPHILS NFR BLD AUTO: 68 %
NRBC # BLD AUTO: 0 10E3/UL
NRBC BLD AUTO-RTO: 0 /100
PLATELET # BLD AUTO: 358 10E3/UL (ref 150–450)
PROT SERPL-MCNC: 8 G/DL (ref 6.8–8.8)
RBC # BLD AUTO: 4.24 10E6/UL (ref 3.8–5.2)
WBC # BLD AUTO: 10.1 10E3/UL (ref 4–11)

## 2021-10-04 PROCEDURE — 85025 COMPLETE CBC W/AUTO DIFF WBC: CPT | Performed by: STUDENT IN AN ORGANIZED HEALTH CARE EDUCATION/TRAINING PROGRAM

## 2021-10-04 PROCEDURE — 99214 OFFICE O/P EST MOD 30 MIN: CPT | Mod: GC | Performed by: STUDENT IN AN ORGANIZED HEALTH CARE EDUCATION/TRAINING PROGRAM

## 2021-10-04 PROCEDURE — 36415 COLL VENOUS BLD VENIPUNCTURE: CPT | Performed by: STUDENT IN AN ORGANIZED HEALTH CARE EDUCATION/TRAINING PROGRAM

## 2021-10-04 PROCEDURE — 250N000011 HC RX IP 250 OP 636

## 2021-10-04 PROCEDURE — G0463 HOSPITAL OUTPT CLINIC VISIT: HCPCS

## 2021-10-04 PROCEDURE — 84155 ASSAY OF PROTEIN SERUM: CPT | Performed by: STUDENT IN AN ORGANIZED HEALTH CARE EDUCATION/TRAINING PROGRAM

## 2021-10-04 PROCEDURE — 90686 IIV4 VACC NO PRSV 0.5 ML IM: CPT

## 2021-10-04 PROCEDURE — G0008 ADMIN INFLUENZA VIRUS VAC: HCPCS

## 2021-10-04 ASSESSMENT — MIFFLIN-ST. JEOR: SCORE: 1353

## 2021-10-04 ASSESSMENT — PAIN SCALES - GENERAL: PAINLEVEL: NO PAIN (0)

## 2021-10-04 NOTE — PATIENT INSTRUCTIONS
Geri Valadez saw Dr. Alison Lerman and Dr. Vannessa Markham on October 4, 2021 for an initial evaluation/follow up visit.    Recommendations:  1. Labs obtained today; if lab results are abnormal or require a change in the plan of care, we will contact you. If you do not hear from us, all the labs are reassuring.   2. Medications: continue Xeljanz as you have been.   3. Flu shot in clinic today  4. Eye appointment in 11/2021  5. You are eligible for a third Moderna dose. Call your clinic to set up of an appointment.   6. Next labs in four months, ~February 2022  7. Next appointment with me in six months, ~April 2022, but please call sooner if you are not doing well or have other questions or concerns.    On August 12, 2021, the FDA authorized additional doses of the Pfizer-BioNTech and Moderna COVID-19 vaccines for certain immunocompromised individuals.  This authorization does NOT currently apply to individuals who have received the Tian & Tian COVID-19 vaccine.    Following the FDA authorization, on August 13, 2021, the CDC's Advisory Committee on Immunization Practices recommended that people with moderately to severely compromised immune systems receive an additional dose of mRNA COVID-19 vaccine (Pfizer-BioNTech or Moderna) at least 28 days after their 2nd dose of those same vaccines.  The recommendation applies for ages 12 years and older for individuals receiving the Pfizer-BioNTech vaccine and 18 years and older for patients receiving the Moderna vaccine.    For people who received either Pfizer-BioNTech or Moderna s COVID-19 vaccine series, a third dose of the same mRNA vaccine should be used. A person should not receive more than three mRNA vaccine doses. If the mRNA vaccine product given for the first two doses is not available or is unknown, either mRNA COVID-19 vaccine product may be administered.    The CDC dose NOT recommend additional doses or booster shots for any other population at this  time.    Based upon the CDC guidelines, patients in the pediatric rheumatology clinic who would be eligible for the third COVID vaccine booster include patients with active treatment with high-dose corticosteroids or other drugs that may suppress your immune response -- see list below.    The specific groups of medications include:     High-dose corticosteroids (>20 mg prednisone/day)    Alkylating agents, e.g. cyclophosphamide (Cytoxan)    Antimetabolites, e.g. methotrexate    Tumor necrosis factor (TNF) blockers, e.g. adalimumab (Humira), etanercept (Enbrel), golimumab (Simponi), infliximab (Remicade), certolizumab-pegol (Cimzia)    Other biologic agents that are immunosuppressive or immunomodulatory.  This latter category includes many medications such as: abatacept (Orencia), anakinra (Kineret), belimumab (Benlysta), canakinumab (Ilaris), ixekizumab (Taltz), rituximab (Rituxan), secukinumab (Cosentyx), tocilizumab (Actemra), ustekinumab (Stelara), and others.  Tofacitinib (Xeljanz) can also be considered in this category.    All immunocompromised patients, including those who receive an additional mRNA dose should continue to follow prevention measures, including:      Wearing a mask    Staying 6 feet apart from those they don t live with    Avoiding crowds and poorly ventilated indoor spaces until advised otherwise by their healthcare provider    Close contacts of immunocompromised people should be strongly encouraged to be vaccinated against COVID-19    The CDC guidelines are available at this website:  https://www.cdc.gov/coronavirus/2019-ncov/vaccines/recommendations/immuno.html    Results: Geri's lab and/or imaging results (if performed) will be mailed to you and your doctor in a formal letter summarizing this visit.  Any pending results at the time of the original note will be sent in a separate letter or relayed by phone.      Outside lab results: If you have labs done at an outside clinic as part of  your follow up, please have the results faxed to us at [phone].    Thank you for allowing me to participate in Geri's care.  If there are any questions or concerns, please do not hesitate to contact us at the phone numbers below.    Alison M. Lerman, MD  Adult and Pediatric Rheumatology Fellow, PGY6    Pediatric Rheumatology Contact Information  [phone] - Nurse line: for medical questions about symptoms and medications   [phone] - Main office: for scheduling needs, refills, records requests  [phone] - Paging : for urgent after-hours needs          For Patient Education Materials:  [email or web address]       [facility] Pediatric Rheumatology    For Help:  The Pediatric Call Center at [phone] can help with scheduling of routine follow up visits.  Antonia Owens and Little Huddleston are the Nurse Coordinators for the Division of Pediatric Rheumatology and can be reached by phone at [phone] or through [employer] ([email or web address]). They can help with questions about your child s rheumatic condition, medications, and test results.  For emergencies after hours or on the weekends, please call the page  at [phone] and ask to speak to the physician on-call for Pediatric Rheumatology. Please do not use [employer] for urgent requests.  Main  Services:  [phone]  o Hmong/Citizen of the [place]/Mozambican: [phone]  o Filipino: [phone]  o British: [phone]    Internal Referrals: If we refer your child to another physician/team within [facility], you should receive a call to set this up. If you do not hear anything within a week, please call the Call Center at [phone].    External Referrals: If we refer your child to a physician/team outside of [facility], our team will send the referral order and relevant records to them. We ask that you call the place where your child is being referred to ensure they received the needed information and  notify our team coordinators if not.    Imaging: If your child needs an imaging study that is not being performed the day of your clinic appointment, please call to set this up. For xrays, ultrasounds, and echocardiogram call [phone]. For CT or MRI call [phone].     MyChart: We encourage you to sign up for MyChart at [email or web address]. For assistance or questions, call [phone]. If your child is 12 years or older, a consent for proxy/parent access needs to be signed so please discuss this with your physician at the next visit.

## 2021-10-04 NOTE — LETTER
10/4/2021      RE: Geri THOMSON Rory  [address]  [place] [address]           Rheumatology History:   Date of symptom onset: 2/14/2016  Date of first visit to center: 3/7/2016  Date of GENE diagnosis: 3/7/2016  ILAR category: polyarticular (RF-positive)  KAYLEEN Status: positive   RF Status: positive   CCP Status: positive   HLA-B27 Status: not done     Erosive disease and strongly positive rheumatoid factor and CCP antibodies. For years, she had been relatively well controlled from a physical exam perspective on her previous combination therapy but still had persisting laboratory abnormalities and erosive damage.        Xeljanz therapy was started in 10/2020 after she had a flare in the late summer/fall of 2020 in her right elbow as well as several digits on both hands. When she began to flare, she had been off her previous combination therapy of Enbrel, methotrexate, and hydroxychloroquine for over a year. Desired a simplified regimen for arthritis control, so single agent Judith was chosen.        Ophthalmology History:   Iritis/Uveitis Comorbidity: no   Date of last eye exam: 8/24/2020          Medications:   As of completion of this visit:  Current Outpatient Medications   Medication Sig Dispense Refill     tofacitinib (XELJANZ) 11 MG 24 hr tablet Take 1 tablet (11 mg) by mouth daily 90 tablet 1     Multiple Vitamins-Minerals (MULTIVITAMIN PO)        Omega-3 Fatty Acids (OMEGA 3 PO)        Date of last TB Screen: 10/8/2020         Allergies:   No Known Allergies        Problem list:     Patient Active Problem List    Diagnosis Date Noted     Cyclic citrullinated peptide (CCP) antibody positive 03/07/2016     Priority: Medium     Positive antinuclear antibody 03/07/2016     Priority: Medium     High total IgG 03/07/2016     Priority: Medium     Elevated rheumatoid factor 02/24/2016     Priority: Medium     Rheumatoid arthritis involving multiple sites with positive rheumatoid factor (H) 02/24/2016     Priority:  Medium     - Previous therapies: Enbrel, methotrexate, HCQ. Stopped in 2019 due to inactive disease.   - Xeljanz started 10/8/2020 after flare in R elbow and R hand.            Subjective:   Geri is a 20 year old female who was seen in Pediatric Rheumatology clinic today for follow up.  Geri was last seen in our clinic on 6/7/2021 and returns today accompanied by her mother and father.  The primary encounter diagnosis was Rheumatoid arthritis involving multiple sites with positive rheumatoid factor (H). A diagnosis of Therapeutic drug monitoring was also pertinent to this visit.  Goals for the visit include discussing frequency of visits, lab monitoring, and clinical status. Prescribed medications have been administered regularly, without missed doses.  Medications have been tolerated well, without side effects.    Geri has not had any bothersome joint pain since our last visit with the exception of recent left ankle pain after she rolled her ankle. This injury happened a couple weeks ago when she stepped on uneven ground and rolled her ankle. She developed some minor pain and swelling in her ankle that has steadily improved since that time. She never had any difficulty walking on the ankle.     She has not had any infectious symptoms since last follow-up. She has had two doses of her Moderna COVID vaccine, last in approximately March or April of this year.     She is finishing her undergraduate degree this semester, so will be done in 12/2020. She signed a full time offer with a [employer] firm in [place] for next year that will start in the fall. She has been walking a lot to stay active. She plans to do at home work-outs during the winter to stay active.      Comprehensive Review of Systems is otherwise negative.    Information per our standardized questionnaire is as below:    Self Report  Patient Pain Status: 0 (This is measured 0 = no pain, 10 = very severe pain)  Patient Global Assessment of Disease  "Activity: 0 (This is measured 0 = very well, 10 = very poorly)  Patient Highest Level of Education: some college or technical school     Interim Arthritis History  Morning Stiffness in the past week: no stiffness  Recent Back Pain: No    Since your last visit has your arthritis stopped you from trying any athletic or rigorous activities or interfaced with your ability to do these activities? No  Have you been limited your ability to do normal daily activities in the past week? No  Did you need help from other people to do normal activities in the past week? No  Have you used any aids or devices to help you do normal daily activities in the past week? No    Important Medical Events  Patient has experienced drug-related serious adverse events since last encounter?: No            Examination:   Blood pressure 122/80, pulse 64, temperature 98  F (36.7  C), temperature source Tympanic, resp. rate 16, height 1.608 m (5' 3.31\"), weight 61.4 kg (135 lb 5.8 oz).  Facility age limit for growth percentiles is 20 years.  Growth percentile SmartLinks can only be used for patients less than 20 years old.  Body surface area is 1.66 meters squared.     Gen: Well appearing; cooperative. No acute distress.  Head: Normal head and hair.  Eyes: No scleral injection, extraocular movements intact, PERRL.   Mouth: MMM, no oral lesions. Visible teeth appear normal.   Skin/Hair: No rashes or lesions noted on visualized skin other than scattered open and closed comedones over the face. Hair pattern on scalp appears normal.  Resp: CTA BL. No cough or wheeze appreciated.   CV: RRR, no m/r/g. No diaphoresis, no cyanosis. No lower extremity edema.   Neuro: Alert, interactive. Answers questions appropriately. Speech fluent. CN appear grossly normal. Strength and tone grossly normal.   MSK: Full active range of motion within the following areas: cervical spine, TMJ, sternoclavicular, acromioclavicular, glenohumeral, wrists, finger, sacroiliac, hip, " knee, ankle, or toe joints. Normal gait.     The right elbow will straighten to 180 degrees while left elbow will hyperextend an additional 5 degrees, slightly tighter flexion on R as compared to L, but still able to touch finger to shoulder    The left ankle shows a very minor degree of soft tissue fullness as compared to the other side, no pain to palpation, normal ROM       Positive ASHLEY test:  No    Total active joints:  0   Total limited joints:  1  Tender entheses count:  0  SI Tenderness: No         Last Lab Results:     No visits with results within 1 Day(s) from this visit.   Latest known visit with results is:   Office Visit on 06/07/2021   Component Date Value     Cholesterol 06/07/2021 149      Triglycerides 06/07/2021 96      HDL Cholesterol 06/07/2021 75      LDL Cholesterol Calculat* 06/07/2021 55      Non HDL Cholesterol 06/07/2021 74      WBC 06/07/2021 9.2      RBC Count 06/07/2021 4.33      Hemoglobin 06/07/2021 13.1      Hematocrit 06/07/2021 39.7      MCV 06/07/2021 92      MCH 06/07/2021 30.3      MCHC 06/07/2021 33.0      RDW 06/07/2021 11.9      Platelet Count 06/07/2021 333      Diff Method 06/07/2021 Automated Method      % Neutrophils 06/07/2021 66.2      % Lymphocytes 06/07/2021 24.6      % Monocytes 06/07/2021 7.9      % Eosinophils 06/07/2021 0.4      % Basophils 06/07/2021 0.7      % Immature Granulocytes 06/07/2021 0.2      Nucleated RBCs 06/07/2021 0      Absolute Neutrophil 06/07/2021 6.1      Absolute Lymphocytes 06/07/2021 2.3      Absolute Monocytes 06/07/2021 0.7      Absolute Eosinophils 06/07/2021 0.0      Absolute Basophils 06/07/2021 0.1      Abs Immature Granulocytes 06/07/2021 0.0      Absolute Nucleated RBC 06/07/2021 0.0      Bilirubin Direct 06/07/2021 0.1      Bilirubin Total 06/07/2021 0.5      Albumin 06/07/2021 3.8      Protein Total 06/07/2021 8.0      Alkaline Phosphatase 06/07/2021 54      ALT 06/07/2021 16      AST 06/07/2021 14      Sed Rate 06/07/2021 17              Assessment:   Geri is a 20 year old female with rheumatoid arthritis with erosive disease and strongly positive rheumatoid factor and CCP antibodies, currently well controlled on Xeljanz. She did have a minor left ankle sprain a couple weeks ago after tripping that is healing well and is no longer causing pain. If there are any issues with delayed healing/complete return of this ankle to baseline, will readdress to ensure that this does not in fact represent developing arthritis however have a low suspicion given history and exam. Her right elbow will extend to 180 degrees but will not hyperextend like the left elbow and feels slightly more tight than the contralateral side with full flexion. She has normal function with this elbow in her daily life as well as athletics without pain or swelling, therefore this is a functionally normal joint without signs of active disease activity. Persistent mild loss of hyperextension may be a sequelae of prior flare. No new areas of arthritis are seen on examination today. Overall, Geri is doing very well on the current treatment regimen and we will plan to continue this agent going forward.      Most recent lipid panel in 6/2021 showed values all within normal limits on a fasting panel. A mild elevation of LDL was noted on prior labs in 12/2020 to 106, normal being <100. Lipid elevation is a side effect of Xeljanz, although it is unlikely to continue to cause elevation after the first few months of therapy. As such, we will not plan to repeat a lipid panel again at this time.     She will receive a flu shot in our clinic today. She does qualify for a third dose of the Moderna COVID-19 vaccination, see AVS for additional details regarding this recommendation. Geri will contact her primary care clinic to schedule this.    Given her clinical stability, we will plan for labs in four months and an office visit in 6 months. Due to insurance questions, family is  investigating whether being seen in my adult clinic or with an adult provider at the  would change the degree of their insurance coverage. Contact information for my adult clinic provided. If an adult provider is needed at the [facility], I will plan to assist with transition of care upon my return from maternity leave in ~1/2022.    ACR Functional Class: Normal  Provider assessment of disease activity:   (This is measured 0 = inactive 10 = highly active)     Health counseling reviewed: immunization, infection, treatment adherence, transition    Is patient on medication for the treatment of GENE: Yes    Treat to Target:   [id number] score: 0.5  Treatment target set: Yes   Treatment target: inactive disease   Disease activity: at target - inactive disease   Physical function: at target   Use of algorithm: No          Plan:   1. Laboratory testing every 4 months, to monitor medications and disease activity, next due 2/2022, can do lab only appointment.  2. No imaging is needed today.   3. No new referrals made today.  4. Medications: As listed. Changes made today: None.  5. Continue eye exam monitoring every 12 months, appointment scheduled for 11/2021.   6. Return in 6 months (on 4/4/2022).     If there are any new questions or concerns, I would be glad to help and can be reached through our main office at [phone] or our paging  at [phone].    Alison M. Lerman, MD  Adult and Pediatric Rheumatology Fellow         Addendum:  Laboratory Investigations:   Laboratory investigations performed today are listed below. No labs remain pending at this time.   Office Visit on 10/04/2021   Component Date Value Ref Range Status     Bilirubin Total 10/04/2021 0.3  0.2 - 1.3 mg/dL Final     Bilirubin Direct 10/04/2021 0.1  0.0 - 0.2 mg/dL Final     Protein Total 10/04/2021 8.0  6.8 - 8.8 g/dL Final     Albumin 10/04/2021 3.8  3.4 - 5.0 g/dL Final     Alkaline Phosphatase 10/04/2021 49  40 - 150 U/L Final     AST  10/04/2021 11  0 - 45 U/L Final     ALT 10/04/2021 19  0 - 50 U/L Final     WBC Count 10/04/2021 10.1  4.0 - 11.0 10e3/uL Final     RBC Count 10/04/2021 4.24  3.80 - 5.20 10e6/uL Final     Hemoglobin 10/04/2021 12.8  11.7 - 15.7 g/dL Final     Hematocrit 10/04/2021 39.9  35.0 - 47.0 % Final     MCV 10/04/2021 94  78 - 100 fL Final     MCH 10/04/2021 30.2  26.5 - 33.0 pg Final     MCHC 10/04/2021 32.1  31.5 - 36.5 g/dL Final     RDW 10/04/2021 12.8  10.0 - 15.0 % Final     Platelet Count 10/04/2021 358  150 - 450 10e3/uL Final     % Neutrophils 10/04/2021 68  % Final     % Lymphocytes 10/04/2021 24  % Final     % Monocytes 10/04/2021 6  % Final     % Eosinophils 10/04/2021 1  % Final     % Basophils 10/04/2021 1  % Final     % Immature Granulocytes 10/04/2021 0  % Final     NRBCs per 100 WBC 10/04/2021 0  <1 /100 Final     Absolute Neutrophils 10/04/2021 6.8  1.6 - 8.3 10e3/uL Final     Absolute Lymphocytes 10/04/2021 2.4  0.8 - 5.3 10e3/uL Final     Absolute Monocytes 10/04/2021 0.6  0.0 - 1.3 10e3/uL Final     Absolute Eosinophils 10/04/2021 0.1  0.0 - 0.7 10e3/uL Final     Absolute Basophils 10/04/2021 0.1  0.0 - 0.2 10e3/uL Final     Absolute Immature Granulocytes 10/04/2021 0.0  <=0.0 10e3/uL Final     Absolute NRBCs 10/04/2021 0.0  10e3/uL Final     The results of these laboratory tests are all within normal limits. You are not showing any signs of side effects from your medications. No changes to the plan of care are needed based on these results.    Physician Attestation   I, Vannessa Markham MD, saw this patient and agree with the findings and plan of care as documented in the note.      Items personally reviewed/procedural attestation: vitals and labs.    Review of the result(s) of each unique test - labs  Ordering of each unique test  Prescription drug management    Vannessa Markham M.D.   of Pediatrics    Pediatric Rheumatology       CC  Patient Care Team:  Elieser Sena MD as  PCP - General (Pediatrics)  Cornelius Leonardo MD as MD (Ophthalmology)  Lerman, Alison Michelle, MD as Fellow (Rheumatology)  Vannessa Markham MD as MD (Pediatric Rheumatology)  Hardy Reese MD as Assigned Pediatric Specialist Provider  SELF, REFERRED    Copy to patient  RoryKaci Rory,Delbert  [address]  [place] [address]      Alison M. Lerman, MD

## 2021-10-04 NOTE — NURSING NOTE
"Chief Complaint   Patient presents with     Arthritis     Rheumatoid arthritis involving multiple sites.     Vitals:    10/04/21 1245   BP: 122/80   BP Location: Right arm   Patient Position: Chair   Pulse: 64   Resp: 16   Temp: 98  F (36.7  C)   TempSrc: Tympanic   Weight: 135 lb 5.8 oz (61.4 kg)   Height: 5' 3.31\" (160.8 cm)      FLU VACCINE QUESTIONNAIRE:  Ask the following questions of all parties who want influenza vaccination:     CONTRAINDICATIONS  1.  Is the patient age less than 6 months?  NO  2.  Has the person to be vaccinated ever had Guillain-Narka syndrome? NO  3.  Has the person to be vaccinated had the vaccine this year? NO  4.  Is the person to be vaccinated sick today? NO  5.  Does the person to be vaccinated have an allergy to eggs or a component of the vaccine? NO  6.  Has the person to vaccinated ever had a serious reaction to an influenza vaccination in the past? NO    If the answer to ALL of the above questions is \"No\", then please administer the influenza vaccine per the standard protocol.  If the patient answered \"Yes\" to questions 1 or 2, do not administer the vaccine. If the patient answered \"Yes\" to question 3, do not administer the vaccine unless the patient is a child receiving the vaccine in two doses. If the patient answered \"Yes\" to questions 4, 5, and/or 6, get additional details on sickness and/or reaction and refer to provider. If you have any questions regarding contraindications, please refer to the provider.                                                         INFLUENZA VACCINATION NOTE      Information sheet given to patient and questions answered.     Patient or representative refused vaccination.   Reason:     ORDERS: Give influenza Vaccine   Ordered by Dr. Lerman on October 4, 2021    [ Do not give Influenza Vaccine due to contraindication or refusal ]    Candidate for Pneumovax? No    INDICATION FOR VACCINATION:  Anyone from 6 months of age or older.        Amberly Quinn, " BRITTON Quinn M.A.    October 4, 2021

## 2021-10-04 NOTE — NURSING NOTE
Peds Outpatient BP  1) Rested for 5 minutes, BP taken on bare arm, patient sitting (or supine for infants) w/ legs uncrossed?   Yes  2) Right arm used?  Right arm   Yes  3) Arm circumference of largest part of upper arm (in cm): 26  4) BP cuff sized used: Adult (25-32cm)   If used different size cuff then what was recommended why? N/A  5) First BP reading:machine   BP Readings from Last 1 Encounters:   10/04/21 122/80      Is reading >90%? Patient over 18.   (90% for <1 years is 90/50)  (90% for >18 years is 140/90)  *If a machine BP is at or above 90% take manual BP  6) Manual BP reading: N/A  7) Other comments: None    Amberly Quinn CMA.

## 2021-10-04 NOTE — LETTER
10/4/2021       RE: Geri THOMSON Rory  [address]  [place] [address]           Rheumatology History:   Date of symptom onset: 2/14/2016  Date of first visit to center: 3/7/2016  Date of GENE diagnosis: 3/7/2016  ILAR category: polyarticular (RF-positive)  KAYLEEN Status: positive   RF Status: positive   CCP Status: positive   [id number] Status: not done     Erosive disease and strongly positive rheumatoid factor and CCP antibodies. For years, she had been relatively well controlled from a physical exam perspective on her previous combination therapy but still had persisting laboratory abnormalities and erosive damage.        Xeljanz therapy was started in 10/2020 after she had a flare in the late summer/fall of 2020 in her right elbow as well as several digits on both hands. When she began to flare, she had been off her previous combination therapy of Enbrel, methotrexate, and hydroxychloroquine for over a year. Desired a simplified regimen for arthritis control, so single agent Jduith was chosen.        Ophthalmology History:   Iritis/Uveitis Comorbidity: no   Date of last eye exam: 8/24/2020          Medications:   As of completion of this visit:  Current Outpatient Medications   Medication Sig Dispense Refill     tofacitinib (XELJANZ) 11 MG 24 hr tablet Take 1 tablet (11 mg) by mouth daily 90 tablet 1     Multiple Vitamins-Minerals (MULTIVITAMIN PO)        Omega-3 Fatty Acids (OMEGA 3 PO)        Date of last TB Screen: 10/8/2020         Allergies:   No Known Allergies        Problem list:     Patient Active Problem List    Diagnosis Date Noted     Cyclic citrullinated peptide (CCP) antibody positive 03/07/2016     Priority: Medium     Positive antinuclear antibody 03/07/2016     Priority: Medium     High total IgG 03/07/2016     Priority: Medium     Elevated rheumatoid factor 02/24/2016     Priority: Medium     Rheumatoid arthritis involving multiple sites with positive rheumatoid factor (H) 02/24/2016     Priority:  Medium     - Previous therapies: Enbrel, methotrexate, HCQ. Stopped in 2019 due to inactive disease.   - Xeljanz started 10/8/2020 after flare in R elbow and R hand.            Subjective:   Geri is a 20 year old female who was seen in Pediatric Rheumatology clinic today for follow up.  Geri was last seen in our clinic on 6/7/2021 and returns today accompanied by her mother and father.  The primary encounter diagnosis was Rheumatoid arthritis involving multiple sites with positive rheumatoid factor (H). A diagnosis of Therapeutic drug monitoring was also pertinent to this visit.  Goals for the visit include discussing frequency of visits, lab monitoring, and clinical status. Prescribed medications have been administered regularly, without missed doses.  Medications have been tolerated well, without side effects.    Geri has not had any bothersome joint pain since our last visit with the exception of recent left ankle pain after she rolled her ankle. This injury happened a couple weeks ago when she stepped on uneven ground and rolled her ankle. She developed some minor pain and swelling in her ankle that has steadily improved since that time. She never had any difficulty walking on the ankle.     She has not had any infectious symptoms since last follow-up. She has had two doses of her Moderna COVID vaccine, last in approximately March or April of this year.     She is finishing her undergraduate degree this semester, so will be done in 12/2020. She signed a full time offer with a [employer] firm in [place] for next year that will start in the fall. She has been walking a lot to stay active. She plans to do at home work-outs during the winter to stay active.      Comprehensive Review of Systems is otherwise negative.    Information per our standardized questionnaire is as below:    Self Report  Patient Pain Status: 0 (This is measured 0 = no pain, 10 = very severe pain)  Patient Global Assessment of Disease  "Activity: 0 (This is measured 0 = very well, 10 = very poorly)  Patient Highest Level of Education: some college or technical school     Interim Arthritis History  Morning Stiffness in the past week: no stiffness  Recent Back Pain: No    Since your last visit has your arthritis stopped you from trying any athletic or rigorous activities or interfaced with your ability to do these activities? No  Have you been limited your ability to do normal daily activities in the past week? No  Did you need help from other people to do normal activities in the past week? No  Have you used any aids or devices to help you do normal daily activities in the past week? No    Important Medical Events  Patient has experienced drug-related serious adverse events since last encounter?: No            Examination:   Blood pressure 122/80, pulse 64, temperature 98  F (36.7  C), temperature source Tympanic, resp. rate 16, height 1.608 m (5' 3.31\"), weight 61.4 kg (135 lb 5.8 oz).  Facility age limit for growth percentiles is 20 years.  Growth percentile SmartLinks can only be used for patients less than 20 years old.  Body surface area is 1.66 meters squared.     Gen: Well appearing; cooperative. No acute distress.  Head: Normal head and hair.  Eyes: No scleral injection, extraocular movements intact, PERRL.   Mouth: MMM, no oral lesions. Visible teeth appear normal.   Skin/Hair: No rashes or lesions noted on visualized skin other than scattered open and closed comedones over the face. Hair pattern on scalp appears normal.  Resp: CTA BL. No cough or wheeze appreciated.   CV: RRR, no m/r/g. No diaphoresis, no cyanosis. No lower extremity edema.   Neuro: Alert, interactive. Answers questions appropriately. Speech fluent. CN appear grossly normal. Strength and tone grossly normal.   MSK: Full active range of motion within the following areas: cervical spine, TMJ, sternoclavicular, acromioclavicular, glenohumeral, wrists, finger, sacroiliac, hip, " knee, ankle, or toe joints. Normal gait.     The right elbow will straighten to 180 degrees while left elbow will hyperextend an additional 5 degrees, slightly tighter flexion on R as compared to L, but still able to touch finger to shoulder    The left ankle shows a very minor degree of soft tissue fullness as compared to the other side, no pain to palpation, normal ROM       Positive ASHLEY test:  No    Total active joints:  0   Total limited joints:  1  Tender entheses count:  0  SI Tenderness: No         Last Lab Results:     No visits with results within 1 Day(s) from this visit.   Latest known visit with results is:   Office Visit on 06/07/2021   Component Date Value     Cholesterol 06/07/2021 149      Triglycerides 06/07/2021 96      HDL Cholesterol 06/07/2021 75      LDL Cholesterol Calculat* 06/07/2021 55      Non HDL Cholesterol 06/07/2021 74      WBC 06/07/2021 9.2      RBC Count 06/07/2021 4.33      Hemoglobin 06/07/2021 13.1      Hematocrit 06/07/2021 39.7      MCV 06/07/2021 92      MCH 06/07/2021 30.3      MCHC 06/07/2021 33.0      RDW 06/07/2021 11.9      Platelet Count 06/07/2021 333      Diff Method 06/07/2021 Automated Method      % Neutrophils 06/07/2021 66.2      % Lymphocytes 06/07/2021 24.6      % Monocytes 06/07/2021 7.9      % Eosinophils 06/07/2021 0.4      % Basophils 06/07/2021 0.7      % Immature Granulocytes 06/07/2021 0.2      Nucleated RBCs 06/07/2021 0      Absolute Neutrophil 06/07/2021 6.1      Absolute Lymphocytes 06/07/2021 2.3      Absolute Monocytes 06/07/2021 0.7      Absolute Eosinophils 06/07/2021 0.0      Absolute Basophils 06/07/2021 0.1      Abs Immature Granulocytes 06/07/2021 0.0      Absolute Nucleated RBC 06/07/2021 0.0      Bilirubin Direct 06/07/2021 0.1      Bilirubin Total 06/07/2021 0.5      Albumin 06/07/2021 3.8      Protein Total 06/07/2021 8.0      Alkaline Phosphatase 06/07/2021 54      ALT 06/07/2021 16      AST 06/07/2021 14      Sed Rate 06/07/2021 17              Assessment:   Geri is a 20 year old female with rheumatoid arthritis with erosive disease and strongly positive rheumatoid factor and CCP antibodies, currently well controlled on Xeljanz. She did have a minor left ankle sprain a couple weeks ago after tripping that is healing well and is no longer causing pain. If there are any issues with delayed healing/complete return of this ankle to baseline, will readdress to ensure that this does not in fact represent developing arthritis however have a low suspicion given history and exam. Her right elbow will extend to 180 degrees but will not hyperextend like the left elbow and feels slightly more tight than the contralateral side with full flexion. She has normal function with this elbow in her daily life as well as athletics without pain or swelling, therefore this is a functionally normal joint without signs of active disease activity. Persistent mild loss of hyperextension may be a sequelae of prior flare. No new areas of arthritis are seen on examination today. Overall, Geri is doing very well on the current treatment regimen and we will plan to continue this agent going forward.      Most recent lipid panel in 6/2021 showed values all within normal limits on a fasting panel. A mild elevation of LDL was noted on prior labs in 12/2020 to 106, normal being <100. Lipid elevation is a side effect of Xeljanz, although it is unlikely to continue to cause elevation after the first few months of therapy. As such, we will not plan to repeat a lipid panel again at this time.     She will receive a flu shot in our clinic today. She does qualify for a third dose of the Moderna COVID-19 vaccination, see AVS for additional details regarding this recommendation. Geri will contact her primary care clinic to schedule this.    Given her clinical stability, we will plan for labs in four months and an office visit in 6 months. Due to insurance questions, family is  investigating whether being seen in my adult clinic or with an adult provider at the  would change the degree of their insurance coverage. Contact information for my adult clinic provided. If an adult provider is needed at the [facility], I will plan to assist with transition of care upon my return from maternity leave in ~1/2022.    ACR Functional Class: Normal  Provider assessment of disease activity:   (This is measured 0 = inactive 10 = highly active)     Health counseling reviewed: immunization, infection, treatment adherence, transition    Is patient on medication for the treatment of GENE: Yes    Treat to Target:   [id number] score: 0.5  Treatment target set: Yes   Treatment target: inactive disease   Disease activity: at target - inactive disease   Physical function: at target   Use of algorithm: No          Plan:   1. Laboratory testing every 4 months, to monitor medications and disease activity, next due 2/2022, can do lab only appointment.  2. No imaging is needed today.   3. No new referrals made today.  4. Medications: As listed. Changes made today: None.  5. Continue eye exam monitoring every 12 months, appointment scheduled for 11/2021.   6. Return in 6 months (on 4/4/2022).     If there are any new questions or concerns, I would be glad to help and can be reached through our main office at [phone] or our paging  at [phone].    Alison M. Lerman, MD  Adult and Pediatric Rheumatology Fellow         Addendum:  Laboratory Investigations:   Laboratory investigations performed today are listed below. No labs remain pending at this time.   Office Visit on 10/04/2021   Component Date Value Ref Range Status     Bilirubin Total 10/04/2021 0.3  0.2 - 1.3 mg/dL Final     Bilirubin Direct 10/04/2021 0.1  0.0 - 0.2 mg/dL Final     Protein Total 10/04/2021 8.0  6.8 - 8.8 g/dL Final     Albumin 10/04/2021 3.8  3.4 - 5.0 g/dL Final     Alkaline Phosphatase 10/04/2021 49  40 - 150 U/L Final     AST  10/04/2021 11  0 - 45 U/L Final     ALT 10/04/2021 19  0 - 50 U/L Final     WBC Count 10/04/2021 10.1  4.0 - 11.0 10e3/uL Final     RBC Count 10/04/2021 4.24  3.80 - 5.20 10e6/uL Final     Hemoglobin 10/04/2021 12.8  11.7 - 15.7 g/dL Final     Hematocrit 10/04/2021 39.9  35.0 - 47.0 % Final     MCV 10/04/2021 94  78 - 100 fL Final     MCH 10/04/2021 30.2  26.5 - 33.0 pg Final     MCHC 10/04/2021 32.1  31.5 - 36.5 g/dL Final     RDW 10/04/2021 12.8  10.0 - 15.0 % Final     Platelet Count 10/04/2021 358  150 - 450 10e3/uL Final     % Neutrophils 10/04/2021 68  % Final     % Lymphocytes 10/04/2021 24  % Final     % Monocytes 10/04/2021 6  % Final     % Eosinophils 10/04/2021 1  % Final     % Basophils 10/04/2021 1  % Final     % Immature Granulocytes 10/04/2021 0  % Final     NRBCs per 100 WBC 10/04/2021 0  <1 /100 Final     Absolute Neutrophils 10/04/2021 6.8  1.6 - 8.3 10e3/uL Final     Absolute Lymphocytes 10/04/2021 2.4  0.8 - 5.3 10e3/uL Final     Absolute Monocytes 10/04/2021 0.6  0.0 - 1.3 10e3/uL Final     Absolute Eosinophils 10/04/2021 0.1  0.0 - 0.7 10e3/uL Final     Absolute Basophils 10/04/2021 0.1  0.0 - 0.2 10e3/uL Final     Absolute Immature Granulocytes 10/04/2021 0.0  <=0.0 10e3/uL Final     Absolute NRBCs 10/04/2021 0.0  10e3/uL Final     The results of these laboratory tests are all within normal limits. You are not showing any signs of side effects from your medications. No changes to the plan of care are needed based on these results.    Physician Attestation   I, Vannessa Markham MD, saw this patient and agree with the findings and plan of care as documented in the note.      Items personally reviewed/procedural attestation: vitals and labs.    Review of the result(s) of each unique test - labs  Ordering of each unique test  Prescription drug management    Vannessa Markham M.D.   of Pediatrics    Pediatric Rheumatology       CC  Patient Care Team:  Elieser Sena MD as  PCP - General (Pediatrics)  Cornelius Leonardo MD as MD (Ophthalmology)  Lerman, Alison Michelle, MD as Fellow (Rheumatology)  Vannessa Markham MD as MD (Pediatric Rheumatology)  Hardy Reese MD as Assigned Pediatric Specialist Provider  SELF, REFERRED    Copy to patient  Kaci Valadez,Delbert  [address]  [place] [address]

## 2021-10-04 NOTE — PROGRESS NOTES
Rheumatology History:   Date of symptom onset: 2/14/2016  Date of first visit to center: 3/7/2016  Date of GENE diagnosis: 3/7/2016  ILAR category: polyarticular (RF-positive)  KAYLEEN Status: positive   RF Status: positive   CCP Status: positive   HLA-B27 Status: not done     Erosive disease and strongly positive rheumatoid factor and CCP antibodies. For years, she had been relatively well controlled from a physical exam perspective on her previous combination therapy but still had persisting laboratory abnormalities and erosive damage.        Xeljanz therapy was started in 10/2020 after she had a flare in the late summer/fall of 2020 in her right elbow as well as several digits on both hands. When she began to flare, she had been off her previous combination therapy of Enbrel, methotrexate, and hydroxychloroquine for over a year. Desired a simplified regimen for arthritis control, so single agent Judith was chosen.        Ophthalmology History:   Iritis/Uveitis Comorbidity: no   Date of last eye exam: 8/24/2020          Medications:   As of completion of this visit:  Current Outpatient Medications   Medication Sig Dispense Refill     tofacitinib (XELJANZ) 11 MG 24 hr tablet Take 1 tablet (11 mg) by mouth daily 90 tablet 1     Multiple Vitamins-Minerals (MULTIVITAMIN PO)        Omega-3 Fatty Acids (OMEGA 3 PO)        Date of last TB Screen: 10/8/2020         Allergies:   No Known Allergies        Problem list:     Patient Active Problem List    Diagnosis Date Noted     Cyclic citrullinated peptide (CCP) antibody positive 03/07/2016     Priority: Medium     Positive antinuclear antibody 03/07/2016     Priority: Medium     High total IgG 03/07/2016     Priority: Medium     Elevated rheumatoid factor 02/24/2016     Priority: Medium     Rheumatoid arthritis involving multiple sites with positive rheumatoid factor (H) 02/24/2016     Priority: Medium     - Previous therapies: Enbrel, methotrexate, HCQ. Stopped in 2019 due  to inactive disease.   - Xeljanz started 10/8/2020 after flare in R elbow and R hand.            Subjective:   Geri is a 20 year old female who was seen in Pediatric Rheumatology clinic today for follow up.  Geri was last seen in our clinic on 6/7/2021 and returns today accompanied by her mother and father.  The primary encounter diagnosis was Rheumatoid arthritis involving multiple sites with positive rheumatoid factor (H). A diagnosis of Therapeutic drug monitoring was also pertinent to this visit.  Goals for the visit include discussing frequency of visits, lab monitoring, and clinical status. Prescribed medications have been administered regularly, without missed doses.  Medications have been tolerated well, without side effects.    Geri has not had any bothersome joint pain since our last visit with the exception of recent left ankle pain after she rolled her ankle. This injury happened a couple weeks ago when she stepped on uneven ground and rolled her ankle. She developed some minor pain and swelling in her ankle that has steadily improved since that time. She never had any difficulty walking on the ankle.     She has not had any infectious symptoms since last follow-up. She has had two doses of her Moderna COVID vaccine, last in approximately March or April of this year.     She is finishing her undergraduate degree this semester, so will be done in 12/2020. She signed a full time offer with a [employer] firm in [place] for next year that will start in the fall. She has been walking a lot to stay active. She plans to do at home work-outs during the winter to stay active.      Comprehensive Review of Systems is otherwise negative.    Information per our standardized questionnaire is as below:    Self Report  Patient Pain Status: 0 (This is measured 0 = no pain, 10 = very severe pain)  Patient Global Assessment of Disease Activity: 0 (This is measured 0 = very well, 10 = very poorly)  Patient Highest Level  "of Education: some college or technical school     Interim Arthritis History  Morning Stiffness in the past week: no stiffness  Recent Back Pain: No    Since your last visit has your arthritis stopped you from trying any athletic or rigorous activities or interfaced with your ability to do these activities? No  Have you been limited your ability to do normal daily activities in the past week? No  Did you need help from other people to do normal activities in the past week? No  Have you used any aids or devices to help you do normal daily activities in the past week? No    Important Medical Events  Patient has experienced drug-related serious adverse events since last encounter?: No            Examination:   Blood pressure 122/80, pulse 64, temperature 98  F (36.7  C), temperature source Tympanic, resp. rate 16, height 1.608 m (5' 3.31\"), weight 61.4 kg (135 lb 5.8 oz).  Facility age limit for growth percentiles is 20 years.  Growth percentile SmartLinks can only be used for patients less than 20 years old.  Body surface area is 1.66 meters squared.     Gen: Well appearing; cooperative. No acute distress.  Head: Normal head and hair.  Eyes: No scleral injection, extraocular movements intact, PERRL.   Mouth: MMM, no oral lesions. Visible teeth appear normal.   Skin/Hair: No rashes or lesions noted on visualized skin other than scattered open and closed comedones over the face. Hair pattern on scalp appears normal.  Resp: CTA BL. No cough or wheeze appreciated.   CV: RRR, no m/r/g. No diaphoresis, no cyanosis. No lower extremity edema.   Neuro: Alert, interactive. Answers questions appropriately. Speech fluent. CN appear grossly normal. Strength and tone grossly normal.   MSK: Full active range of motion within the following areas: cervical spine, TMJ, sternoclavicular, acromioclavicular, glenohumeral, wrists, finger, sacroiliac, hip, knee, ankle, or toe joints. Normal gait.     The right elbow will straighten to 180 " degrees while left elbow will hyperextend an additional 5 degrees, slightly tighter flexion on R as compared to L, but still able to touch finger to shoulder    The left ankle shows a very minor degree of soft tissue fullness as compared to the other side, no pain to palpation, normal ROM       Positive ASHLEY test:  No    Total active joints:  0   Total limited joints:  1  Tender entheses count:  0  SI Tenderness: No         Last Lab Results:     No visits with results within 1 Day(s) from this visit.   Latest known visit with results is:   Office Visit on 06/07/2021   Component Date Value     Cholesterol 06/07/2021 149      Triglycerides 06/07/2021 96      HDL Cholesterol 06/07/2021 75      LDL Cholesterol Calculat* 06/07/2021 55      Non HDL Cholesterol 06/07/2021 74      WBC 06/07/2021 9.2      RBC Count 06/07/2021 4.33      Hemoglobin 06/07/2021 13.1      Hematocrit 06/07/2021 39.7      MCV 06/07/2021 92      MCH 06/07/2021 30.3      MCHC 06/07/2021 33.0      RDW 06/07/2021 11.9      Platelet Count 06/07/2021 333      Diff Method 06/07/2021 Automated Method      % Neutrophils 06/07/2021 66.2      % Lymphocytes 06/07/2021 24.6      % Monocytes 06/07/2021 7.9      % Eosinophils 06/07/2021 0.4      % Basophils 06/07/2021 0.7      % Immature Granulocytes 06/07/2021 0.2      Nucleated RBCs 06/07/2021 0      Absolute Neutrophil 06/07/2021 6.1      Absolute Lymphocytes 06/07/2021 2.3      Absolute Monocytes 06/07/2021 0.7      Absolute Eosinophils 06/07/2021 0.0      Absolute Basophils 06/07/2021 0.1      Abs Immature Granulocytes 06/07/2021 0.0      Absolute Nucleated RBC 06/07/2021 0.0      Bilirubin Direct 06/07/2021 0.1      Bilirubin Total 06/07/2021 0.5      Albumin 06/07/2021 3.8      Protein Total 06/07/2021 8.0      Alkaline Phosphatase 06/07/2021 54      ALT 06/07/2021 16      AST 06/07/2021 14      Sed Rate 06/07/2021 17             Assessment:   Geri is a 20 year old female with rheumatoid arthritis with  erosive disease and strongly positive rheumatoid factor and CCP antibodies, currently well controlled on Xeljanz. She did have a minor left ankle sprain a couple weeks ago after tripping that is healing well and is no longer causing pain. If there are any issues with delayed healing/complete return of this ankle to baseline, will readdress to ensure that this does not in fact represent developing arthritis however have a low suspicion given history and exam. Her right elbow will extend to 180 degrees but will not hyperextend like the left elbow and feels slightly more tight than the contralateral side with full flexion. She has normal function with this elbow in her daily life as well as athletics without pain or swelling, therefore this is a functionally normal joint without signs of active disease activity. Persistent mild loss of hyperextension may be a sequelae of prior flare. No new areas of arthritis are seen on examination today. Overall, Geri is doing very well on the current treatment regimen and we will plan to continue this agent going forward.      Most recent lipid panel in 6/2021 showed values all within normal limits on a fasting panel. A mild elevation of LDL was noted on prior labs in 12/2020 to 106, normal being <100. Lipid elevation is a side effect of Xeljanz, although it is unlikely to continue to cause elevation after the first few months of therapy. As such, we will not plan to repeat a lipid panel again at this time.     She will receive a flu shot in our clinic today. She does qualify for a third dose of the Moderna COVID-19 vaccination, see AVS for additional details regarding this recommendation. Geri will contact her primary care clinic to schedule this.    Given her clinical stability, we will plan for labs in four months and an office visit in 6 months. Due to insurance questions, family is investigating whether being seen in my adult clinic or with an adult provider at the  would  change the degree of their insurance coverage. Contact information for my adult clinic provided. If an adult provider is needed at the [facility], I will plan to assist with transition of care upon my return from maternity leave in ~1/2022.    ACR Functional Class: Normal  Provider assessment of disease activity:   (This is measured 0 = inactive 10 = highly active)     Health counseling reviewed: immunization, infection, treatment adherence, transition    Is patient on medication for the treatment of GENE: Yes    Treat to Target:   [id number] score: 0.5  Treatment target set: Yes   Treatment target: inactive disease   Disease activity: at target - inactive disease   Physical function: at target   Use of algorithm: No          Plan:   1. Laboratory testing every 4 months, to monitor medications and disease activity, next due 2/2022, can do lab only appointment.  2. No imaging is needed today.   3. No new referrals made today.  4. Medications: As listed. Changes made today: None.  5. Continue eye exam monitoring every 12 months, appointment scheduled for 11/2021.   6. Return in 6 months (on 4/4/2022).     If there are any new questions or concerns, I would be glad to help and can be reached through our main office at [phone] or our paging  at [phone].    Alison M. Lerman, MD  Adult and Pediatric Rheumatology Fellow         Addendum:  Laboratory Investigations:   Laboratory investigations performed today are listed below. No labs remain pending at this time.   Office Visit on 10/04/2021   Component Date Value Ref Range Status     Bilirubin Total 10/04/2021 0.3  0.2 - 1.3 mg/dL Final     Bilirubin Direct 10/04/2021 0.1  0.0 - 0.2 mg/dL Final     Protein Total 10/04/2021 8.0  6.8 - 8.8 g/dL Final     Albumin 10/04/2021 3.8  3.4 - 5.0 g/dL Final     Alkaline Phosphatase 10/04/2021 49  40 - 150 U/L Final     AST 10/04/2021 11  0 - 45 U/L Final     ALT 10/04/2021 19  0 - 50 U/L Final     WBC Count 10/04/2021  10.1  4.0 - 11.0 10e3/uL Final     RBC Count 10/04/2021 4.24  3.80 - 5.20 10e6/uL Final     Hemoglobin 10/04/2021 12.8  11.7 - 15.7 g/dL Final     Hematocrit 10/04/2021 39.9  35.0 - 47.0 % Final     MCV 10/04/2021 94  78 - 100 fL Final     MCH 10/04/2021 30.2  26.5 - 33.0 pg Final     MCHC 10/04/2021 32.1  31.5 - 36.5 g/dL Final     RDW 10/04/2021 12.8  10.0 - 15.0 % Final     Platelet Count 10/04/2021 358  150 - 450 10e3/uL Final     % Neutrophils 10/04/2021 68  % Final     % Lymphocytes 10/04/2021 24  % Final     % Monocytes 10/04/2021 6  % Final     % Eosinophils 10/04/2021 1  % Final     % Basophils 10/04/2021 1  % Final     % Immature Granulocytes 10/04/2021 0  % Final     NRBCs per 100 WBC 10/04/2021 0  <1 /100 Final     Absolute Neutrophils 10/04/2021 6.8  1.6 - 8.3 10e3/uL Final     Absolute Lymphocytes 10/04/2021 2.4  0.8 - 5.3 10e3/uL Final     Absolute Monocytes 10/04/2021 0.6  0.0 - 1.3 10e3/uL Final     Absolute Eosinophils 10/04/2021 0.1  0.0 - 0.7 10e3/uL Final     Absolute Basophils 10/04/2021 0.1  0.0 - 0.2 10e3/uL Final     Absolute Immature Granulocytes 10/04/2021 0.0  <=0.0 10e3/uL Final     Absolute NRBCs 10/04/2021 0.0  10e3/uL Final     The results of these laboratory tests are all within normal limits. You are not showing any signs of side effects from your medications. No changes to the plan of care are needed based on these results.    Physician Attestation   I, Vannessa Markham MD, saw this patient and agree with the findings and plan of care as documented in the note.      Items personally reviewed/procedural attestation: vitals and labs.    Review of the result(s) of each unique test - labs  Ordering of each unique test  Prescription drug management    Vannessa Markham M.D.   of Pediatrics    Pediatric Rheumatology       CC  Patient Care Team:  Elieser Sena MD as PCP - General (Pediatrics)  Cornelius Leonardo MD as MD (Ophthalmology)  Lerman, Alison  MD Katarzyna as Fellow (Rheumatology)  Vannessa Markham MD as MD (Pediatric Rheumatology)  Hardy Reese MD as Assigned Pediatric Specialist Provider  SELF, REFERRED    Copy to patient  Kaci Valadez,Delbert  [address] [place] [place] [address]

## 2021-11-13 ENCOUNTER — HEALTH MAINTENANCE LETTER (OUTPATIENT)
Age: 21
End: 2021-11-13

## 2021-11-15 ENCOUNTER — OFFICE VISIT (OUTPATIENT)
Dept: FAMILY MEDICINE | Facility: CLINIC | Age: 21
End: 2021-11-15
Payer: COMMERCIAL

## 2021-11-15 VITALS
SYSTOLIC BLOOD PRESSURE: 124 MMHG | HEART RATE: 93 BPM | WEIGHT: 139 LBS | BODY MASS INDEX: 24.38 KG/M2 | DIASTOLIC BLOOD PRESSURE: 86 MMHG | TEMPERATURE: 98.5 F | OXYGEN SATURATION: 99 % | RESPIRATION RATE: 16 BRPM

## 2021-11-15 DIAGNOSIS — Z12.4 SCREENING FOR CERVICAL CANCER: ICD-10-CM

## 2021-11-15 DIAGNOSIS — Z11.3 SCREENING FOR STDS (SEXUALLY TRANSMITTED DISEASES): ICD-10-CM

## 2021-11-15 DIAGNOSIS — Z30.011 ENCOUNTER FOR INITIAL PRESCRIPTION OF CONTRACEPTIVE PILLS: ICD-10-CM

## 2021-11-15 DIAGNOSIS — Z11.4 SCREENING FOR HIV (HUMAN IMMUNODEFICIENCY VIRUS): ICD-10-CM

## 2021-11-15 DIAGNOSIS — Z00.00 ROUTINE GENERAL MEDICAL EXAMINATION AT A HEALTH CARE FACILITY: Primary | ICD-10-CM

## 2021-11-15 LAB — HCG UR QL: NEGATIVE

## 2021-11-15 PROCEDURE — 80061 LIPID PANEL: CPT | Performed by: PHYSICIAN ASSISTANT

## 2021-11-15 PROCEDURE — 36415 COLL VENOUS BLD VENIPUNCTURE: CPT | Performed by: PHYSICIAN ASSISTANT

## 2021-11-15 PROCEDURE — 87389 HIV-1 AG W/HIV-1&-2 AB AG IA: CPT | Performed by: PHYSICIAN ASSISTANT

## 2021-11-15 PROCEDURE — G0145 SCR C/V CYTO,THINLAYER,RESCR: HCPCS | Performed by: PHYSICIAN ASSISTANT

## 2021-11-15 PROCEDURE — 87491 CHLMYD TRACH DNA AMP PROBE: CPT | Performed by: PHYSICIAN ASSISTANT

## 2021-11-15 PROCEDURE — 87591 N.GONORRHOEAE DNA AMP PROB: CPT | Performed by: PHYSICIAN ASSISTANT

## 2021-11-15 PROCEDURE — 99385 PREV VISIT NEW AGE 18-39: CPT | Performed by: PHYSICIAN ASSISTANT

## 2021-11-15 PROCEDURE — 82947 ASSAY GLUCOSE BLOOD QUANT: CPT | Performed by: PHYSICIAN ASSISTANT

## 2021-11-15 PROCEDURE — 81025 URINE PREGNANCY TEST: CPT | Performed by: PHYSICIAN ASSISTANT

## 2021-11-15 PROCEDURE — 99213 OFFICE O/P EST LOW 20 MIN: CPT | Mod: 25 | Performed by: PHYSICIAN ASSISTANT

## 2021-11-15 RX ORDER — DESOGESTREL AND ETHINYL ESTRADIOL 0.15-0.03
1 KIT ORAL DAILY
Qty: 84 TABLET | Refills: 4 | Status: SHIPPED | OUTPATIENT
Start: 2021-11-15 | End: 2022-10-03

## 2021-11-15 ASSESSMENT — ENCOUNTER SYMPTOMS
PARESTHESIAS: 0
ABDOMINAL PAIN: 0
HEMATURIA: 0
PALPITATIONS: 0
ARTHRALGIAS: 0
NAUSEA: 0
HEARTBURN: 0
WEAKNESS: 0
JOINT SWELLING: 0
SORE THROAT: 0
NERVOUS/ANXIOUS: 0
DYSURIA: 0
MYALGIAS: 0
FEVER: 0
DIARRHEA: 0
COUGH: 0
FREQUENCY: 0
CONSTIPATION: 0
HEADACHES: 0
CHILLS: 0
SHORTNESS OF BREATH: 0
HEMATOCHEZIA: 0
DIZZINESS: 0
EYE PAIN: 0

## 2021-11-15 ASSESSMENT — PAIN SCALES - GENERAL: PAINLEVEL: NO PAIN (0)

## 2021-11-15 NOTE — PROGRESS NOTES
SUBJECTIVE:   CC: Geri Valadez is an 21 year old woman who presents for preventive health visit.       Patient has been advised of split billing requirements and indicates understanding: Yes  Healthy Habits:     Getting at least 3 servings of Calcium per day:  Yes    Bi-annual eye exam:  Yes    Dental care twice a year:  Yes    Sleep apnea or symptoms of sleep apnea:  None    Diet:  Gluten-free/reduced and Breakfast skipped    Frequency of exercise:  2-3 days/week    Duration of exercise:  15-30 minutes    Taking medications regularly:  Yes    Barriers to taking medications:  None    Medication side effects:  None    PHQ-2 Total Score: 0    Additional concerns today:  No            Today's PHQ-2 Score:   PHQ-2 ( 1999 Pfizer) 11/15/2021   Q1: Little interest or pleasure in doing things 0   Q2: Feeling down, depressed or hopeless 0   PHQ-2 Score 0   Q1: Little interest or pleasure in doing things Not at all   Q2: Feeling down, depressed or hopeless Not at all   PHQ-2 Score 0       Abuse: Current or Past (Physical, Sexual or Emotional) - No  Do you feel safe in your environment? Yes    Have you ever done Advance Care Planning? (For example, a Health Directive, POLST, or a discussion with a medical provider or your loved ones about your wishes): No, advance care planning information given to patient to review.  Patient declined advance care planning discussion at this time.    Social History     Tobacco Use     Smoking status: Never Smoker     Smokeless tobacco: Never Used   Substance Use Topics     Alcohol use: Not on file     If you drink alcohol do you typically have >3 drinks per day or >7 drinks per week? No    Alcohol Use 11/15/2021   Prescreen: >3 drinks/day or >7 drinks/week? No   Prescreen: >3 drinks/day or >7 drinks/week? -       Reviewed orders with patient.  Reviewed health maintenance and updated orders accordingly - Yes  Patient Active Problem List   Diagnosis     Elevated rheumatoid factor     Cyclic  citrullinated peptide (CCP) antibody positive     Positive antinuclear antibody     High total IgG     Rheumatoid arthritis involving multiple sites with positive rheumatoid factor (H)     History reviewed. No pertinent surgical history.    Social History     Tobacco Use     Smoking status: Never Smoker     Smokeless tobacco: Never Used   Substance Use Topics     Alcohol use: Not on file     Family History   Problem Relation Age of Onset     Hypertension Father          Current Outpatient Medications   Medication Sig Dispense Refill     desogestrel-ethinyl estradiol (APRI) 0.15-30 MG-MCG tablet Take 1 tablet by mouth daily Ok to skip placebo week 84 tablet 4     Multiple Vitamins-Minerals (MULTIVITAMIN PO)        Omega-3 Fatty Acids (OMEGA 3 PO)        tofacitinib (XELJANZ) 11 MG 24 hr tablet Take 1 tablet (11 mg) by mouth daily 90 tablet 1     No Known Allergies    Breast Cancer Screening:        History of abnormal Pap smear: NO - age 21-29 PAP every 3 years recommended     Reviewed and updated as needed this visit by clinical staff  Tobacco  Allergies  Meds   Med Hx  Surg Hx  Fam Hx  Soc Hx       Reviewed and updated as needed this visit by Provider                   Review of Systems   Constitutional: Negative for chills and fever.   HENT: Negative for congestion, ear pain, hearing loss and sore throat.    Eyes: Negative for pain and visual disturbance.   Respiratory: Negative for cough and shortness of breath.    Cardiovascular: Negative for chest pain, palpitations and peripheral edema.   Gastrointestinal: Negative for abdominal pain, constipation, diarrhea, heartburn, hematochezia and nausea.   Genitourinary: Negative for dysuria, frequency, genital sores, hematuria and urgency.   Musculoskeletal: Negative for arthralgias, joint swelling and myalgias.   Skin: Negative for rash.   Neurological: Negative for dizziness, weakness, headaches and paresthesias.   Psychiatric/Behavioral: Negative for mood  changes. The patient is not nervous/anxious.           OBJECTIVE:   /86   Pulse 93   Temp 98.5  F (36.9  C)   Resp 16   Wt 63 kg (139 lb)   SpO2 99%   BMI 24.38 kg/m    Physical Exam  Constitutional:       General: She is not in acute distress.     Appearance: She is well-developed.   HENT:      Right Ear: Tympanic membrane and external ear normal.      Left Ear: Tympanic membrane and external ear normal.      Nose: Nose normal.      Mouth/Throat:      Pharynx: No oropharyngeal exudate.   Eyes:      General:         Right eye: No discharge.         Left eye: No discharge.      Conjunctiva/sclera: Conjunctivae normal.      Pupils: Pupils are equal, round, and reactive to light.   Neck:      Thyroid: No thyromegaly.      Trachea: No tracheal deviation.   Cardiovascular:      Rate and Rhythm: Normal rate and regular rhythm.      Pulses: Normal pulses.      Heart sounds: Normal heart sounds, S1 normal and S2 normal. No murmur heard.  No friction rub. No S3 or S4 sounds.    Pulmonary:      Effort: Pulmonary effort is normal. No respiratory distress.      Breath sounds: Normal breath sounds. No wheezing or rales.   Chest:   Breasts:      Right: No mass, nipple discharge or tenderness.      Left: No mass, nipple discharge or tenderness.       Abdominal:      Palpations: Abdomen is soft. There is no mass.      Tenderness: There is no abdominal tenderness.   Genitourinary:     Vagina: Normal.      Cervix: No cervical motion tenderness or discharge.   Musculoskeletal:         General: Normal range of motion.      Cervical back: Neck supple.   Lymphadenopathy:      Cervical: No cervical adenopathy.   Skin:     General: Skin is warm and dry.      Findings: No rash.   Neurological:      Mental Status: She is alert and oriented to person, place, and time.      Motor: No abnormal muscle tone.   Psychiatric:         Thought Content: Thought content normal.         Judgment: Judgment normal.           Diagnostic Test  Results:  Results for orders placed or performed in visit on 11/15/21   HIV Antigen Antibody Combo     Status: Normal   Result Value Ref Range    HIV Antigen Antibody Combo Nonreactive Nonreactive   Lipid panel reflex to direct LDL Fasting     Status: None   Result Value Ref Range    Cholesterol 166 <200 mg/dL    Triglycerides 107 <150 mg/dL    Direct Measure HDL 62 >=50 mg/dL    LDL Cholesterol Calculated 83 <=100 mg/dL    Non HDL Cholesterol 104 <130 mg/dL    Patient Fasting > 8hrs? No     Narrative    Cholesterol  Desirable:  <200 mg/dL    Triglycerides  Normal:  Less than 150 mg/dL  Borderline High:  150-199 mg/dL  High:  200-499 mg/dL  Very High:  Greater than or equal to 500 mg/dL    Direct Measure HDL  Female:  Greater than or equal to 50 mg/dL   Male:  Greater than or equal to 40 mg/dL    LDL Cholesterol  Desirable:  <100mg/dL  Above Desirable:  100-129 mg/dL   Borderline High:  130-159 mg/dL   High:  160-189 mg/dL   Very High:  >= 190 mg/dL    Non HDL Cholesterol  Desirable:  130 mg/dL  Above Desirable:  130-159 mg/dL  Borderline High:  160-189 mg/dL  High:  190-219 mg/dL  Very High:  Greater than or equal to 220 mg/dL   Glucose     Status: None   Result Value Ref Range    Glucose 82 70 - 99 mg/dL    Patient Fasting > 8hrs? No    HCG Qual, Urine ([id number])     Status: Normal   Result Value Ref Range    hCG Urine Qualitative Negative Negative   Pap Screen only - recommended age 21 - 24 years     Status: None   Result Value Ref Range    Interpretation        Negative for Intraepithelial Lesion or Malignancy (NILM)    Comment         Papanicolaou Test Limitations:  Cervical cytology is a screening test with limited sensitivity, and regular screening is critical for cancer prevention.  Pap tests are primarily effective for the diagnosis/prevention of squamous cell carcinoma, not adenocarcinoma or other cancers.        Specimen Adequacy       Satisfactory for evaluation, endocervical/transformation zone component  "absent    Clinical Information       none      HPV Reflex No     Previous Abnormal?       No      Performing Labs       The technical component of this testing was completed at [facility]     Chlamydia trachomatis PCR - Clinic Collect     Status: Normal    Specimen: Cervix; Swab   Result Value Ref Range    Chlamydia trachomatis Negative Negative   Neisseria gonorrhoeae PCR     Status: Normal    Specimen: Cervix; Swab   Result Value Ref Range    Neisseria gonorrhoeae Negative Negative       ASSESSMENT/PLAN:       ICD-10-CM    1. Routine general medical examination at a health care facility  Z00.00 Lipid panel reflex to direct LDL Fasting     Glucose     Lipid panel reflex to direct LDL Fasting     Glucose   2. Screening for STDs (sexually transmitted diseases)  Z11.3 Chlamydia trachomatis PCR - Clinic Collect     Neisseria gonorrhoeae PCR     CANCELED: NEISSERIA GONORRHOEA PCR     CANCELED: CHLAMYDIA TRACHOMATIS PCR   3. Screening for HIV (human immunodeficiency virus)  Z11.4 HIV Antigen Antibody Combo     HIV Antigen Antibody Combo   4. Screening for cervical cancer  Z12.4 Pap Screen only - recommended age 21 - 24 years   5. Encounter for initial prescription of contraceptive pills  Z30.011 desogestrel-ethinyl estradiol (APRI) 0.15-30 MG-MCG tablet     HCG Qual, Urine ([id number])     HCG Qual, Urine ([id number])      Prev care as above  OCP rx sent - LMP unknown, confirmed neg pregnancy test today          Patient has been advised of split billing requirements and indicates understanding: Yes  COUNSELING:  Reviewed preventive health counseling, as reflected in patient instructions    Estimated body mass index is 24.38 kg/m  as calculated from the following:    Height as of 10/4/21: 1.608 m (5' 3.31\").    Weight as of this encounter: 63 kg (139 lb).        She reports that she has never smoked. She has never used smokeless tobacco.      Counseling " Resources:  ATP IV Guidelines  Pooled Cohorts Equation Calculator  Breast Cancer Risk Calculator  BRCA-Related Cancer Risk Assessment: FHS-7 Tool  FRAX Risk Assessment  ICSI Preventive Guidelines  Dietary Guidelines for Americans, 2010  USDA's MyPlate  ASA Prophylaxis  Lung CA Screening    Vannessa Ruiz PA-C  [facility]

## 2021-11-16 LAB
C TRACH DNA SPEC QL NAA+PROBE: NEGATIVE
CHOLEST SERPL-MCNC: 166 MG/DL
FASTING STATUS PATIENT QL REPORTED: NO
FASTING STATUS PATIENT QL REPORTED: NO
GLUCOSE BLD-MCNC: 82 MG/DL (ref 70–99)
HDLC SERPL-MCNC: 62 MG/DL
HIV 1+2 AB+HIV1 P24 AG SERPL QL IA: NONREACTIVE
LDLC SERPL CALC-MCNC: 83 MG/DL
N GONORRHOEA DNA SPEC QL NAA+PROBE: NEGATIVE
NONHDLC SERPL-MCNC: 104 MG/DL
TRIGL SERPL-MCNC: 107 MG/DL

## 2021-11-16 NOTE — RESULT ENCOUNTER NOTE
Geri    Your lab tests are complete and I have reviewed the results.     Urine pregnancy test is negative - go ahead and start the birth control pills.     If you have any questions or concerns, please feel free to call or send a [employer] message.    Sincerely,  Daniel Ruiz PA-C

## 2021-11-17 LAB
BKR LAB AP GYN ADEQUACY: NORMAL
BKR LAB AP GYN INTERPRETATION: NORMAL
BKR LAB AP HPV REFLEX: NO
BKR LAB AP PREVIOUS ABNORMAL: NORMAL
PATH REPORT.COMMENTS IMP SPEC: NORMAL
PATH REPORT.COMMENTS IMP SPEC: NORMAL
PATH REPORT.RELEVANT HX SPEC: NORMAL

## 2021-11-23 NOTE — RESULT ENCOUNTER NOTE
Geri    Your lab tests are complete and I have reviewed the results.     - Your lab results look great; everything is normal.    If you have any questions or concerns, please feel free to call or send a [employer] message.    Sincerely,  Daniel Ruiz PA-C

## 2021-12-02 ENCOUNTER — PATIENT OUTREACH (OUTPATIENT)
Dept: FAMILY MEDICINE | Facility: CLINIC | Age: 21
End: 2021-12-02
Payer: COMMERCIAL

## 2021-12-02 NOTE — TELEPHONE ENCOUNTER
Patient is taking Xeljanz for RA  11/15/21 NIL Pap. Plan annual pap for 2 more years due 11/15/22.

## 2022-02-08 ENCOUNTER — LAB (OUTPATIENT)
Dept: LAB | Facility: CLINIC | Age: 22
End: 2022-02-08
Payer: COMMERCIAL

## 2022-02-08 DIAGNOSIS — M05.79 RHEUMATOID ARTHRITIS INVOLVING MULTIPLE SITES WITH POSITIVE RHEUMATOID FACTOR (H): ICD-10-CM

## 2022-02-08 DIAGNOSIS — Z51.81 THERAPEUTIC DRUG MONITORING: ICD-10-CM

## 2022-02-08 LAB
ALBUMIN SERPL-MCNC: 3.5 G/DL (ref 3.4–5)
ALP SERPL-CCNC: 41 U/L (ref 40–150)
ALT SERPL W P-5'-P-CCNC: 18 U/L (ref 0–50)
AST SERPL W P-5'-P-CCNC: 12 U/L (ref 0–45)
BASOPHILS # BLD AUTO: 0.1 10E3/UL (ref 0–0.2)
BASOPHILS NFR BLD AUTO: 1 %
BILIRUB DIRECT SERPL-MCNC: <0.1 MG/DL (ref 0–0.2)
BILIRUB SERPL-MCNC: 0.4 MG/DL (ref 0.2–1.3)
EOSINOPHIL # BLD AUTO: 0.1 10E3/UL (ref 0–0.7)
EOSINOPHIL NFR BLD AUTO: 1 %
ERYTHROCYTE [DISTWIDTH] IN BLOOD BY AUTOMATED COUNT: 12.4 % (ref 10–15)
HCT VFR BLD AUTO: 39.2 % (ref 35–47)
HGB BLD-MCNC: 12.6 G/DL (ref 11.7–15.7)
IMM GRANULOCYTES # BLD: 0 10E3/UL
IMM GRANULOCYTES NFR BLD: 0 %
LYMPHOCYTES # BLD AUTO: 1.7 10E3/UL (ref 0.8–5.3)
LYMPHOCYTES NFR BLD AUTO: 19 %
MCH RBC QN AUTO: 30.1 PG (ref 26.5–33)
MCHC RBC AUTO-ENTMCNC: 32.1 G/DL (ref 31.5–36.5)
MCV RBC AUTO: 94 FL (ref 78–100)
MONOCYTES # BLD AUTO: 0.6 10E3/UL (ref 0–1.3)
MONOCYTES NFR BLD AUTO: 7 %
NEUTROPHILS # BLD AUTO: 6.2 10E3/UL (ref 1.6–8.3)
NEUTROPHILS NFR BLD AUTO: 72 %
NRBC # BLD AUTO: 0 10E3/UL
NRBC BLD AUTO-RTO: 0 /100
PLATELET # BLD AUTO: 335 10E3/UL (ref 150–450)
PROT SERPL-MCNC: 7.8 G/DL (ref 6.8–8.8)
RBC # BLD AUTO: 4.18 10E6/UL (ref 3.8–5.2)
WBC # BLD AUTO: 8.7 10E3/UL (ref 4–11)

## 2022-02-08 PROCEDURE — 36415 COLL VENOUS BLD VENIPUNCTURE: CPT

## 2022-02-08 PROCEDURE — 85025 COMPLETE CBC W/AUTO DIFF WBC: CPT

## 2022-02-08 PROCEDURE — 82040 ASSAY OF SERUM ALBUMIN: CPT

## 2022-02-15 ENCOUNTER — TELEPHONE (OUTPATIENT)
Dept: RHEUMATOLOGY | Facility: CLINIC | Age: 22
End: 2022-02-15
Payer: COMMERCIAL

## 2022-02-15 NOTE — TELEPHONE ENCOUNTER
PA Initiation-Received call from [facility] to renew PA but PA was already in process.    Medication: Xeljanz-initiated  Insurance Company: [employer] - Phone [phone] Fax [phone]  Pharmacy Filling the Rx: [facility] - [address] [place] [address]  Filling Pharmacy Phone:    Filling Pharmacy Fax:    Start Date: 2/15/2022

## 2022-02-17 NOTE — TELEPHONE ENCOUNTER
Prior Authorization ApprovalCase#[id number]    Authorization Effective Date: 1/18/2022  Authorization Expiration Date: 2/17/2023  Medication: Xeljanz-renewal approved  Approved Dose/Quantity: 30/30  Reference #: Key: [id number]   Insurance Company: [employer] - Phone [phone] Fax [phone]  Expected CoPay:       CoPay Card Available:      Foundation Assistance Needed:    Which Pharmacy is filling the prescription (Not needed for infusion/clinic administered): [facility] [place] [address]  Pharmacy Notified: Yes  Patient Notified: Yes-called Geri and let her know approved for another year and to go [facility] and [facility] and set up an order.

## 2022-03-28 DIAGNOSIS — M05.79 RHEUMATOID ARTHRITIS INVOLVING MULTIPLE SITES WITH POSITIVE RHEUMATOID FACTOR (H): Primary | ICD-10-CM

## 2022-03-28 DIAGNOSIS — R76.8 CYCLIC CITRULLINATED PEPTIDE (CCP) ANTIBODY POSITIVE: ICD-10-CM

## 2022-04-11 DIAGNOSIS — M05.79 RHEUMATOID ARTHRITIS INVOLVING MULTIPLE SITES WITH POSITIVE RHEUMATOID FACTOR (H): ICD-10-CM

## 2022-05-17 DIAGNOSIS — M05.79 RHEUMATOID ARTHRITIS INVOLVING MULTIPLE SITES WITH POSITIVE RHEUMATOID FACTOR (H): ICD-10-CM

## 2022-05-17 NOTE — TELEPHONE ENCOUNTER
Geri has transferred her care to me in the [facility] system. I will continue to refill this mediation from that office.

## 2022-09-30 DIAGNOSIS — Z30.011 ENCOUNTER FOR INITIAL PRESCRIPTION OF CONTRACEPTIVE PILLS: ICD-10-CM

## 2022-10-03 RX ORDER — DESOGESTREL AND ETHINYL ESTRADIOL 0.15-0.03
KIT ORAL
Qty: 84 TABLET | Refills: 0 | Status: SHIPPED | OUTPATIENT
Start: 2022-10-03 | End: 2022-11-18

## 2022-11-18 ENCOUNTER — OFFICE VISIT (OUTPATIENT)
Dept: FAMILY MEDICINE | Facility: CLINIC | Age: 22
End: 2022-11-18
Payer: COMMERCIAL

## 2022-11-18 VITALS
WEIGHT: 143.8 LBS | TEMPERATURE: 98.3 F | BODY MASS INDEX: 20.13 KG/M2 | HEART RATE: 86 BPM | DIASTOLIC BLOOD PRESSURE: 72 MMHG | RESPIRATION RATE: 14 BRPM | SYSTOLIC BLOOD PRESSURE: 120 MMHG | OXYGEN SATURATION: 98 % | HEIGHT: 71 IN

## 2022-11-18 DIAGNOSIS — Z23 NEED FOR IMMUNIZATION AGAINST INFLUENZA: ICD-10-CM

## 2022-11-18 DIAGNOSIS — Z30.011 ENCOUNTER FOR INITIAL PRESCRIPTION OF CONTRACEPTIVE PILLS: ICD-10-CM

## 2022-11-18 DIAGNOSIS — Z23 NEED FOR DIPHTHERIA-TETANUS-PERTUSSIS (TDAP) VACCINE: ICD-10-CM

## 2022-11-18 DIAGNOSIS — Z11.3 SCREENING FOR STDS (SEXUALLY TRANSMITTED DISEASES): ICD-10-CM

## 2022-11-18 DIAGNOSIS — Z00.00 ENCOUNTER FOR ROUTINE ADULT HEALTH EXAMINATION WITHOUT ABNORMAL FINDINGS: Primary | ICD-10-CM

## 2022-11-18 DIAGNOSIS — Z12.4 CERVICAL CANCER SCREENING: ICD-10-CM

## 2022-11-18 PROCEDURE — 90472 IMMUNIZATION ADMIN EACH ADD: CPT | Performed by: PHYSICIAN ASSISTANT

## 2022-11-18 PROCEDURE — G0145 SCR C/V CYTO,THINLAYER,RESCR: HCPCS | Performed by: PHYSICIAN ASSISTANT

## 2022-11-18 PROCEDURE — 86780 TREPONEMA PALLIDUM: CPT | Performed by: PHYSICIAN ASSISTANT

## 2022-11-18 PROCEDURE — 36415 COLL VENOUS BLD VENIPUNCTURE: CPT | Performed by: PHYSICIAN ASSISTANT

## 2022-11-18 PROCEDURE — 87389 HIV-1 AG W/HIV-1&-2 AB AG IA: CPT | Performed by: PHYSICIAN ASSISTANT

## 2022-11-18 PROCEDURE — 87591 N.GONORRHOEAE DNA AMP PROB: CPT | Performed by: PHYSICIAN ASSISTANT

## 2022-11-18 PROCEDURE — 87491 CHLMYD TRACH DNA AMP PROBE: CPT | Performed by: PHYSICIAN ASSISTANT

## 2022-11-18 PROCEDURE — 90686 IIV4 VACC NO PRSV 0.5 ML IM: CPT | Performed by: PHYSICIAN ASSISTANT

## 2022-11-18 PROCEDURE — 90471 IMMUNIZATION ADMIN: CPT | Performed by: PHYSICIAN ASSISTANT

## 2022-11-18 PROCEDURE — 99395 PREV VISIT EST AGE 18-39: CPT | Mod: 25 | Performed by: PHYSICIAN ASSISTANT

## 2022-11-18 PROCEDURE — 90715 TDAP VACCINE 7 YRS/> IM: CPT | Performed by: PHYSICIAN ASSISTANT

## 2022-11-18 RX ORDER — DESOGESTREL AND ETHINYL ESTRADIOL 0.15-0.03
1 KIT ORAL DAILY
Qty: 84 TABLET | Refills: 4 | Status: SHIPPED | OUTPATIENT
Start: 2022-11-18 | End: 2023-11-17

## 2022-11-18 RX ORDER — FOLIC ACID 1 MG/1
TABLET ORAL
COMMUNITY
Start: 2022-10-03

## 2022-11-18 ASSESSMENT — ENCOUNTER SYMPTOMS
JOINT SWELLING: 0
WEAKNESS: 0
EYE PAIN: 0
SHORTNESS OF BREATH: 0
CONSTIPATION: 0
SORE THROAT: 0
DIARRHEA: 0
NAUSEA: 0
HEMATOCHEZIA: 0
CHILLS: 0
COUGH: 0
ARTHRALGIAS: 0
ABDOMINAL PAIN: 0
DIZZINESS: 0
BREAST MASS: 0
NERVOUS/ANXIOUS: 0
MYALGIAS: 0
PALPITATIONS: 0
PARESTHESIAS: 0
FEVER: 0
HEARTBURN: 0
FREQUENCY: 0
HEADACHES: 0
DYSURIA: 0
HEMATURIA: 0

## 2022-11-18 NOTE — PROGRESS NOTES
SUBJECTIVE:   CC: Geri is an 22 year old who presents for preventive health visit.   Patient has been advised of split billing requirements and indicates understanding: Yes  Healthy Habits:     Getting at least 3 servings of Calcium per day:  Yes    Bi-annual eye exam:  Yes    Dental care twice a year:  Yes    Sleep apnea or symptoms of sleep apnea:  None    Diet:  Regular (no restrictions)    Frequency of exercise:  2-3 days/week    Duration of exercise:  15-30 minutes    Taking medications regularly:  Yes    Medication side effects:  Not applicable    PHQ-2 Total Score: 0    Additional concerns today:  No        Today's PHQ-2 Score:   PHQ-2 ( 1999 [employer]) 11/18/2022   Q1: Little interest or pleasure in doing things 0   Q2: Feeling down, depressed or hopeless 0   PHQ-2 Score 0   PHQ-2 Total Score (12-17 Years)- Positive if 3 or more points; Administer PHQ-A if positive -   Q1: Little interest or pleasure in doing things Not at all   Q2: Feeling down, depressed or hopeless Several days   PHQ-2 Score 1           Social History     Tobacco Use     Smoking status: Never     Smokeless tobacco: Never   Substance Use Topics     Alcohol use: Not on file         Alcohol Use 11/18/2022   Prescreen: >3 drinks/day or >7 drinks/week? No   Prescreen: >3 drinks/day or >7 drinks/week? -       Reviewed orders with patient.  Reviewed health maintenance and updated orders accordingly - Yes  Patient Active Problem List   Diagnosis     Elevated rheumatoid factor     Cyclic citrullinated peptide (CCP) antibody positive     Positive antinuclear antibody     High total IgG     Rheumatoid arthritis involving multiple sites with positive rheumatoid factor (H)     Cervical cancer screening     No past surgical history on file.    Social History     Tobacco Use     Smoking status: Never     Smokeless tobacco: Never   Substance Use Topics     Alcohol use: Not on file     Family History   Problem Relation Age of Onset     Hypertension  Father          Current Outpatient Medications   Medication Sig Dispense Refill     desogestrel-ethinyl estradiol (APRI) 0.15-30 MG-MCG tablet Take 1 tablet by mouth daily May skip placebo week and start a new pack every 3 weeks. 84 tablet 4     tofacitinib (XELJANZ) 11 MG 24 hr tablet Take 1 tablet (11 mg) by mouth in the morning. PLEASE SCHEDULE AN APPOINTEMNT 30 tablet 0     folic acid (FOLVITE) 1 MG tablet        methotrexate 2.5 MG tablet        No Known Allergies    Breast Cancer Screening:        History of abnormal Pap smear: NO - age 21-29, taking immunosuppressant medication - PAP annually for 3 years.   PAP / HPV Latest Ref Rng & Units 11/18/2022 11/15/2021   PAP   Negative for Intraepithelial Lesion or Malignancy (NILM) Negative for Intraepithelial Lesion or Malignancy (NILM)     Reviewed and updated as needed this visit by clinical staff    Allergies  Meds              Reviewed and updated as needed this visit by Provider                     Review of Systems   Constitutional: Negative for chills and fever.   HENT: Negative for congestion, ear pain, hearing loss and sore throat.    Eyes: Negative for pain and visual disturbance.   Respiratory: Negative for cough and shortness of breath.    Cardiovascular: Negative for chest pain, palpitations and peripheral edema.   Gastrointestinal: Negative for abdominal pain, constipation, diarrhea, heartburn, hematochezia and nausea.   Breasts:  Negative for tenderness, breast mass and discharge.   Genitourinary: Negative for dysuria, frequency, genital sores, hematuria, pelvic pain, urgency, vaginal bleeding and vaginal discharge.   Musculoskeletal: Negative for arthralgias, joint swelling and myalgias.   Skin: Negative for rash.   Neurological: Negative for dizziness, weakness, headaches and paresthesias.   Psychiatric/Behavioral: Negative for mood changes. The patient is not nervous/anxious.           OBJECTIVE:   /72   Pulse 86   Temp 98.3  F (36.8  C)  "(Tympanic)   Resp 14   Ht 1.8 m (5' 10.87\")   Wt 65.2 kg (143 lb 12.8 oz)   LMP 11/06/2022 (Approximate)   SpO2 98%   BMI 20.13 kg/m    Physical Exam  Constitutional:       General: She is not in acute distress.     Appearance: She is well-developed. She is not diaphoretic.   HENT:      Head: Normocephalic.      Right Ear: External ear normal.      Left Ear: External ear normal.      Nose: Nose normal.   Eyes:      Conjunctiva/sclera: Conjunctivae normal.   Cardiovascular:      Rate and Rhythm: Normal rate and regular rhythm.      Heart sounds: Normal heart sounds.   Pulmonary:      Effort: Pulmonary effort is normal.      Breath sounds: Normal breath sounds.   Musculoskeletal:      Cervical back: Normal range of motion.   Skin:     General: Skin is warm and dry.   Neurological:      Mental Status: She is alert and oriented to person, place, and time.   Psychiatric:         Judgment: Judgment normal.           Diagnostic Test Results:  Labs reviewed in Epic    ASSESSMENT/PLAN:   Geri was seen today for physical.    Diagnoses and all orders for this visit:    Encounter for routine adult health examination without abnormal findings    Encounter for initial prescription of contraceptive pills  -     desogestrel-ethinyl estradiol (APRI) 0.15-30 MG-MCG tablet; Take 1 tablet by mouth daily May skip placebo week and start a new pack every 3 weeks.    Screening for STDs (sexually transmitted diseases)  -     NEISSERIA GONORRHOEA PCR  -     CHLAMYDIA TRACHOMATIS PCR  -     Treponema Abs w Reflex to RPR and Titer; Future  -     HIV Antigen Antibody Combo; Future  -     Treponema Abs w Reflex to RPR and Titer  -     HIV Antigen Antibody Combo    Cervical cancer screening  -     Pap Screen only - recommended age 21 - 24 years    Need for diphtheria-tetanus-pertussis (Tdap) vaccine  -     TDAP VACCINE (Adacel, Boostrix)    Need for immunization against influenza  -     INFLUENZA VACCINE IM > 6 MONTHS VALENT IIV4 " (AFLURIA/FLUZONE)              COUNSELING:  Reviewed preventive health counseling, as reflected in patient instructions        She reports that she has never smoked. She has never used smokeless tobacco.          GLENROY Leary [facility]

## 2022-11-19 LAB
C TRACH DNA SPEC QL NAA+PROBE: NEGATIVE
HIV 1+2 AB+HIV1 P24 AG SERPL QL IA: NONREACTIVE
N GONORRHOEA DNA SPEC QL NAA+PROBE: NEGATIVE
T PALLIDUM AB SER QL: NONREACTIVE

## 2022-11-20 NOTE — RESULT ENCOUNTER NOTE
Geri    Your lab tests are complete and I have reviewed the results.     - Your STD test panel was negative for infection.  - You will receive your Pap Smear results in a separate letter.    If you have any questions or concerns, please feel free to call or send a [employer] message.    Sincerely,  Daniel Ruiz PA-C

## 2022-11-23 ENCOUNTER — PATIENT OUTREACH (OUTPATIENT)
Dept: FAMILY MEDICINE | Facility: CLINIC | Age: 22
End: 2022-11-23

## 2022-11-23 DIAGNOSIS — Z12.4 CERVICAL CANCER SCREENING: Primary | ICD-10-CM

## 2023-08-11 NOTE — PROGRESS NOTES
Problem list:     Patient Active Problem List    Diagnosis Date Noted     Cyclic citrullinated peptide (CCP) antibody positive 03/07/2016     Positive antinuclear antibody 03/07/2016     High total IgG 03/07/2016     Elevated rheumatoid factor 02/24/2016     Rheumatoid arthritis involving multiple sites with positive rheumatoid factor (H) 02/24/2016          Allergies:     No Known Allergies          Medications:     As of completion of this visit:  Current Outpatient Prescriptions   Medication Sig Dispense Refill     Etanercept (ENBREL SURECLICK) 50 MG/ML autoinjector Inject 50 mg Subcutaneous once a week 4 mL 11     folic acid (FOLVITE) 1 MG tablet Take 1 tablet (1 mg) by mouth daily 30 tablet 11     methotrexate 2.5 MG tablet CHEMO Take 6 tablets (15 mg) by mouth once a week 24 tablet 11      Geri has been receiving and tolerating her medications well, without missed doses or notable side effects.         Subjective:     Geri is a 16 year old female who was seen in Pediatric Rheumatology clinic today for follow up.  Geri was last seen in our clinic on 11/30/2016 and returns today accompanied by her parents.  The primary encounter diagnosis was Rheumatoid arthritis involving multiple sites with positive rheumatoid factor (H). A diagnosis of Cyclic citrullinated peptide (CCP) antibody positive was also pertinent to this visit.      Geri has done well since I last saw her.  She gets her medicines regularly without any trouble.  She has no stiffness, swelling, loss of range of motion, pain on range of motion or increased warmth of the joints.  She is attending school regularly and has a very challenging set of classes (college level).  She is a hard worker.  She gets only about 5 hours of sleep at night, but does get naps during the day and feels like she is doing okay.  She has not had any significant illnesses this winter.  Remaining review of systems is completely negative for any new concerns.  PROCEDURE: US Renal Bilateral.



TECHNIQUE: Multiple real-time grayscale images were obtained over

the kidneys in various projections bilaterally.



INDICATION: Renal cysts.



Correlation is made to prior ultrasound from 05/31/2022.



Right kidney measures 10.2 x 4.6 x 3.6 cm and the left kidney

measures 7.6 x 4.0 x 4.9 cm. Right lower pole renal cyst measures

3.2 x 3.6 x 2.9 cm. This compares with 3.4 x 3.6 x 2.9 cm. Left

renal cyst upper pole measures 3.0 x 2.6 x 2.9 cm. This compares

with 3.2 x 2.3 x 2.2 cm on prior. There is cortical thinning

bilaterally. No calculi are seen. There is no hydronephrosis.

Prostate is enlarged measuring 4.1 x 3.0 x 4.5 cm and appears to

indent the bladder base. Ureteral jets were not visualized.



IMPRESSION: Bilateral renal cysts. Left renal cyst measures

slightly larger when compared to examination from 05/31/2022. No

calculi or hydronephrosis is detected.



Dictated by: 



  Dictated on workstation # [id number] "    Information per our standardized questionnaire is as below:  Last Exam  Last Eye Exam: 10/14/16  Last Radiograph : 03/29/17  Self Report  Patient Pain Status: .5  Patient Global Assessment Of Disease Activity: .5  Score Reported By: Self  Arthritis History  Morning stiffness in the past week: None  Has your arthritis stopped from trying any athletic or rigorous activities, or interfaced with your ability to do these activities: No  Have you been limited your ability to do normal daily activities in the past week: No  Did you needed help from other people to do normal activities in the past week: No  Have you used any aids or devices to help you do normal daily activities in the past week: No            Examination:     Blood pressure 102/69, pulse 67, temperature 97.8  F (36.6  C), temperature source Oral, height 5' 2.99\" (160 cm), weight 128 lb 12 oz (58.4 kg).  Geri appears generally well and in good spirits.  Head: Normal head and hair.  Eyes: No scleral injection, pupils normal.  Ears: Normal external structures, tympanic membranes.  Nose: No cartilage deformity, congestion.  Mouth: Normal teeth, gums, tongue, mucosa.  Throat: Normal, without erythema or exudate.  Neck: Normal, without thyromegaly  Nodes: No cervical, supraclavicular, axillary, inguinal adenopathy.  Lungs: Normal effort, clear to ausculation.  Heart: Regular rate and rhythm, S1 and S2, no murmurs; normal peripheral pulses and perfusion.  Abdomen: Soft, non-tender, without hepatomegaly, splenomegaly, or masses.  Skin: Acne.  No other inflammatory lesions, only normal scratches and bruises.  Nails: No pitting, infection.  Neurological: Alert, appropriately interactive, normal cranial nerves, no deficits, normal gait.  Musculoskeletal: No evidence of current synovitis of the cervical spine, TMJ, sternoclavicular, acromioclavicular, glenohumeral, elbow, wrist, finger, lumbar spine, hip, knee, ankle, or toe joints. No tendonitis or bursitis. No " enthesitis.          Assessment:     Rheumatoid arthritis with high CCP, as well as positive for a rheumatoid factor and KAYLEEN, and hypergammaglobulinemia.  She is doing well at this point.  I think reassessment of her radiographs, given the question of erosive changes in the past is worthwhile.  We discussed the chronic nature of this disease and how she is likely to stay on combination therapy of this type indefinitely.  I do not expect her to be on it forever, however, as there are always better options in development, and there are even some individuals who have progressively less need for therapy.     Change Since Last Visit: Same  ACR Functional Class: Normal  Provider Global Assessment Of Disease Activity: Inactive (0)  (This is measured on the scale of 0 - 10)  On Medication For Treatment Of GENE?: Yes  Normal ESR: Normal, or abnormality not due to GENE  Normal CRP: Normal, or abnormality not due to GENE  KAYLEEN Status: Positive  Rheumatoid Factor Status: Positive  In Compliance With Screening Interval For GENE: Yes         Plan:     1. She will have safety monitoring labs today.    2. I will not include the labs to look at immune activation.    3. We will have follow up radiographs of her feet and hands today.    4. She will continue to have eye exams annually.    5. She will stay on her medicines unchanged and I updated all of her prescriptions.    6. I will see her back in 4 months for reevaluation.     If there are any new questions or concerns, I would be glad to help and can be reached through our main office at [phone] or our paging  at [phone].    Hardy Reese MD         Addendum:  Imaging Results:     Recent Results (from the past 24 hour(s))   XR Hand Bilateral 1 vw (AP)    Narrative    HISTORY: Rheumatology assessment.    COMPARISON: 3/7/2016.    FINDINGS: AP view of the right and left hand at 1718 hours. Joint  alignments are maintained. No fracture is identified. There is a  lucent  rounded region in the right second metacarpal head, similar to  prior. No new area concerning for erosion is identified.      Impression    IMPRESSION: Stable appearance of the hands with a questionable erosion  in the second metacarpal on the right.    AMISH COSTELLO MD   XR Foot Bilateral 1 View    Narrative    HISTORY: Evaluate for erosions.    COMPARISON: 3/7/2016.    FINDINGS: AP view of the right and left foot at 1719 hours. Joint  alignments are maintained. No fracture is identified. Focal lucency in  the distal right fifth metatarsal is again noted, with a new adjacent  similar focal lucency. Notch like region in the first proximal phalanx  of the right foot is not clearly seen on today's images. No other  erosion is identified.      Impression    IMPRESSION: 2 focal rounded lucencies in the cortex of the distal  right fifth metatarsal, concerning for erosions. Right first  metatarsal phalanx appears normal on today's examination.    AMISH COSTELLO MD   Overall, things are stable but there is clearly a need to NOT reduce current treatment.         Addendum:  Laboratory Investigations:     Results for orders placed or performed in visit on 03/29/17 (from the past 48 hour(s))   CBC with platelets differential   Result Value Ref Range    WBC 5.3 4.0 - 11.0 10e9/L    RBC Count 4.30 3.7 - 5.3 10e12/L    Hemoglobin 13.2 11.7 - 15.7 g/dL    Hematocrit 39.0 35.0 - 47.0 %    MCV 91 77 - 100 fl    MCH 30.7 26.5 - 33.0 pg    MCHC 33.8 31.5 - 36.5 g/dL    RDW 12.0 10.0 - 15.0 %    Platelet Count 301 150 - 450 10e9/L    Diff Method Automated Method     % Neutrophils 40.0 %    % Lymphocytes 42.6 %    % Monocytes 13.2 %    % Eosinophils 2.1 %    % Basophils 1.9 %    % Immature Granulocytes 0.2 %    Nucleated RBCs 0 0 /100    Absolute Neutrophil 2.1 1.3 - 7.0 10e9/L    Absolute Lymphocytes 2.3 1.0 - 5.8 10e9/L    Absolute Monocytes 0.7 0.0 - 1.3 10e9/L    Absolute Eosinophils 0.1 0.0 - 0.7 10e9/L    Absolute Basophils 0.1 0.0 - 0.2  10e9/L    Abs Immature Granulocytes 0.0 0 - 0.4 10e9/L    Absolute Nucleated RBC 0.0    Creatinine   Result Value Ref Range    Creatinine 0.79 0.50 - 1.00 mg/dL    GFR Estimate >90  Non  GFR Calc   >60 mL/min/1.7m2    GFR Estimate If Black >90   GFR Calc   >60 mL/min/1.7m2   CRP inflammation   Result Value Ref Range    CRP Inflammation <2.9 0.0 - 8.0 mg/L   Hepatic panel   Result Value Ref Range    Bilirubin Direct <0.1 0.0 - 0.2 mg/dL    Bilirubin Total 0.2 0.2 - 1.3 mg/dL    Albumin 3.6 3.4 - 5.0 g/dL    Protein Total 7.9 6.8 - 8.8 g/dL    Alkaline Phosphatase 77 40 - 150 U/L    ALT 24 0 - 50 U/L    AST 15 0 - 35 U/L   RBC  Sed Rate   Result Value Ref Range    Sed Rate 19 0 - 20 mm/h    These results are reassuring.         CC  Patient Care Team:  Elieser Sena MD as PCP - General (Pediatrics)  Hardy Reese MD as MD (Pediatric Rheumatology)  Cornelius Leonardo MD as MD (Ophthalmology)  ELIESER SENA    Copy to patient  Kaci Valadez,Delbert  [address]  [place] [address]

## 2023-11-17 ENCOUNTER — OFFICE VISIT (OUTPATIENT)
Dept: FAMILY MEDICINE | Facility: CLINIC | Age: 23
End: 2023-11-17
Payer: COMMERCIAL

## 2023-11-17 VITALS
SYSTOLIC BLOOD PRESSURE: 120 MMHG | WEIGHT: 154 LBS | BODY MASS INDEX: 27.29 KG/M2 | TEMPERATURE: 98 F | HEART RATE: 92 BPM | OXYGEN SATURATION: 98 % | DIASTOLIC BLOOD PRESSURE: 82 MMHG | HEIGHT: 63 IN | RESPIRATION RATE: 18 BRPM

## 2023-11-17 DIAGNOSIS — R41.840 CONCENTRATION DEFICIT: ICD-10-CM

## 2023-11-17 DIAGNOSIS — Z30.011 ENCOUNTER FOR INITIAL PRESCRIPTION OF CONTRACEPTIVE PILLS: ICD-10-CM

## 2023-11-17 DIAGNOSIS — Z00.00 ROUTINE HISTORY AND PHYSICAL EXAMINATION OF ADULT: Primary | ICD-10-CM

## 2023-11-17 DIAGNOSIS — M05.79 RHEUMATOID ARTHRITIS INVOLVING MULTIPLE SITES WITH POSITIVE RHEUMATOID FACTOR (H): Chronic | ICD-10-CM

## 2023-11-17 PROCEDURE — 90480 ADMN SARSCOV2 VAC 1/ONLY CMP: CPT | Performed by: PHYSICIAN ASSISTANT

## 2023-11-17 PROCEDURE — 99395 PREV VISIT EST AGE 18-39: CPT | Mod: 25 | Performed by: PHYSICIAN ASSISTANT

## 2023-11-17 PROCEDURE — 90686 IIV4 VACC NO PRSV 0.5 ML IM: CPT | Performed by: PHYSICIAN ASSISTANT

## 2023-11-17 PROCEDURE — 90471 IMMUNIZATION ADMIN: CPT | Performed by: PHYSICIAN ASSISTANT

## 2023-11-17 PROCEDURE — 91320 SARSCV2 VAC 30MCG TRS-SUC IM: CPT | Performed by: PHYSICIAN ASSISTANT

## 2023-11-17 PROCEDURE — 99214 OFFICE O/P EST MOD 30 MIN: CPT | Mod: 25 | Performed by: PHYSICIAN ASSISTANT

## 2023-11-17 RX ORDER — DESOGESTREL AND ETHINYL ESTRADIOL 0.15-0.03
1 KIT ORAL DAILY
Qty: 84 TABLET | Refills: 4 | Status: SHIPPED | OUTPATIENT
Start: 2023-11-17

## 2023-11-17 ASSESSMENT — ENCOUNTER SYMPTOMS
HEARTBURN: 0
FREQUENCY: 0
BREAST MASS: 0
NERVOUS/ANXIOUS: 0
CONSTIPATION: 0
SORE THROAT: 0
EYE PAIN: 0
HEADACHES: 0
DYSURIA: 0
COUGH: 0
DIARRHEA: 0
ABDOMINAL PAIN: 0
WEAKNESS: 0
ARTHRALGIAS: 0
MYALGIAS: 0
SHORTNESS OF BREATH: 0
DIZZINESS: 0
PARESTHESIAS: 0
CHILLS: 0
NAUSEA: 0
HEMATOCHEZIA: 0
JOINT SWELLING: 0
PALPITATIONS: 0
HEMATURIA: 0
FEVER: 0

## 2023-11-17 ASSESSMENT — PAIN SCALES - GENERAL: PAINLEVEL: NO PAIN (0)

## 2023-11-17 NOTE — PROGRESS NOTES
SUBJECTIVE:   Geri is a 23 year old, presenting for the following:  Physical        11/17/2023     8:13 AM   Additional Questions   Roomed by Rita PATRICK       Healthy Habits:     Getting at least 3 servings of Calcium per day:  Yes    Bi-annual eye exam:  Yes    Dental care twice a year:  Yes    Sleep apnea or symptoms of sleep apnea:  None    Diet:  Breakfast skipped    Frequency of exercise:  2-3 days/week    Duration of exercise:  15-30 minutes    Taking medications regularly:  Yes    Medication side effects:  None    Additional concerns today:  Yes    Geri presents to the clinic for annual exam.    She reports trouble focusing at work, does not typically able to get work done unless she has a strong deadline.  Feels irritable often. Has poor sleep habits.  Wonders if she could have ADHD.     She follows with rheumatology for RA, taking Xeljanz and methotrexate          Today's PHQ-2 Score:       11/17/2023    12:40 AM   PHQ-2 ( 1999 [employer])   Q1: Little interest or pleasure in doing things 1   Q2: Feeling down, depressed or hopeless 1   PHQ-2 Score 2   Q1: Little interest or pleasure in doing things Several days   Q2: Feeling down, depressed or hopeless Several days   PHQ-2 Score 2         Social History     Tobacco Use    Smoking status: Never    Smokeless tobacco: Never   Substance Use Topics    Alcohol use: Yes     Comment: Socially, a couple drinks with friends on weekends.             11/17/2023    12:39 AM   Alcohol Use   Prescreen: >3 drinks/day or >7 drinks/week? No     Reviewed orders with patient.  Reviewed health maintenance and updated orders accordingly - Yes  Patient Active Problem List   Diagnosis    Elevated rheumatoid factor    Cyclic citrullinated peptide (CCP) antibody positive    Positive antinuclear antibody    High total IgG    Rheumatoid arthritis involving multiple sites with positive rheumatoid factor (H)    Cervical cancer screening     History reviewed. No pertinent surgical  history.    Social History     Tobacco Use    Smoking status: Never    Smokeless tobacco: Never   Substance Use Topics    Alcohol use: Yes     Comment: Socially, a couple drinks with friends on weekends.     Family History   Problem Relation Age of Onset    Hypertension Father     Hyperlipidemia Father          Current Outpatient Medications   Medication Sig Dispense Refill    desogestrel-ethinyl estradiol (APRI) 0.15-30 MG-MCG tablet Take 1 tablet by mouth daily May skip placebo week and start a new pack every 3 weeks. 84 tablet 4    folic acid (FOLVITE) 1 MG tablet       methotrexate 2.5 MG tablet       tofacitinib (XELJANZ) 11 MG 24 hr tablet Take 1 tablet (11 mg) by mouth in the morning. PLEASE SCHEDULE AN APPOINTEMNT 30 tablet 0     No Known Allergies    Breast Cancer Screening:        History of abnormal Pap smear: NO - age 21-29 PAP every 3 years recommended      11/18/2022     3:08 PM 11/15/2021     2:25 PM   PAP / HPV   PAP Negative for Intraepithelial Lesion or Malignancy (NILM)  Negative for Intraepithelial Lesion or Malignancy (NILM)      Reviewed and updated as needed this visit by clinical staff   Tobacco  Allergies  Meds              Reviewed and updated as needed this visit by Provider                 Past Medical History:   Diagnosis Date    Arthritis of ankle, right 02/14/2016    Initial evaluation 2/24/16 after 1.5 weeks of swelling, pain; elevated RF, CRP    Cyclic citrullinated peptide (CCP) antibody positive 03/07/2016    Elevated rheumatoid factor 02/24/2016    High total IgG 03/07/2016    GENE (juvenile idiopathic arthritis), polyarthritis, rheumatoid factor positive (H) 03/07/2016    Pneumonitis 01/01/2003    Positive antinuclear antibody 03/07/2016    Rheumatoid arthritis involving multiple sites with positive rheumatoid factor (H) 02/24/2016      History reviewed. No pertinent surgical history.  OB History   No obstetric history on file.       Review of Systems   Constitutional:   "Negative for chills and fever.   HENT:  Negative for congestion, ear pain, hearing loss and sore throat.    Eyes:  Negative for pain and visual disturbance.   Respiratory:  Negative for cough and shortness of breath.    Cardiovascular:  Negative for chest pain, palpitations and peripheral edema.   Gastrointestinal:  Negative for abdominal pain, constipation, diarrhea, heartburn, hematochezia and nausea.   Breasts:  Negative for tenderness, breast mass and discharge.   Genitourinary:  Negative for dysuria, frequency, genital sores, hematuria, pelvic pain, urgency, vaginal bleeding and vaginal discharge.   Musculoskeletal:  Negative for arthralgias, joint swelling and myalgias.   Skin:  Negative for rash.   Neurological:  Negative for dizziness, weakness, headaches and paresthesias.   Psychiatric/Behavioral:  Negative for mood changes. The patient is not nervous/anxious.           OBJECTIVE:   /82 (BP Location: Left arm, Patient Position: Sitting, Cuff Size: Adult Regular)   Pulse 92   Temp 98  F (36.7  C) (Tympanic)   Resp 18   Ht 1.611 m (5' 3.43\")   Wt 69.9 kg (154 lb)   LMP 11/04/2023 (Approximate)   SpO2 98%   BMI 26.92 kg/m    Physical Exam  GENERAL: healthy, alert and no distress  EYES: Eyes grossly normal to inspection, PERRL and conjunctivae and sclerae normal  HENT: ear canals and TM's normal, nose and mouth without ulcers or lesions  NECK: no adenopathy, no asymmetry, masses, or scars and thyroid normal to palpation  RESP: lungs clear to auscultation - no rales, rhonchi or wheezes  CV: regular rate and rhythm, normal S1 S2, no S3 or S4, no murmur, click or rub, no peripheral edema and peripheral pulses strong  ABDOMEN: soft, nontender, no hepatosplenomegaly, no masses and bowel sounds normal  MS: no gross musculoskeletal defects noted, no edema  SKIN: no suspicious lesions or rashes  NEURO: Normal strength and tone, mentation intact and speech normal  PSYCH: mentation appears normal, affect " "normal/bright        ASSESSMENT/PLAN:       1. Routine history and physical examination of adult  She will schedule another appointment for pap    2. Concentration deficit  Referral placed for ADHD evaluation  - Adult Mental Health  Referral; Future    3. Rheumatoid arthritis involving multiple sites with positive rheumatoid factor (H)  Stable, following with rheumatology    6. Encounter for initial prescription of contraceptive pills  - desogestrel-ethinyl estradiol (APRI) 0.15-30 MG-MCG tablet; Take 1 tablet by mouth daily May skip placebo week and start a new pack every 3 weeks.  Dispense: 84 tablet; Refill: 4          COUNSELING:  Reviewed preventive health counseling, as reflected in patient instructions       Regular exercise       Healthy diet/nutrition      BMI:   Estimated body mass index is 26.92 kg/m  as calculated from the following:    Height as of this encounter: 1.611 m (5' 3.43\").    Weight as of this encounter: 69.9 kg (154 lb).         She reports that she has never smoked. She has never used smokeless tobacco.    Omer Waldron PA-C  [facility]  "

## 2024-01-08 PROBLEM — Z12.4 CERVICAL CANCER SCREENING: Status: ACTIVE | Noted: 2021-12-02

## 2024-01-08 NOTE — TELEPHONE ENCOUNTER
Andre Tello,    You saw this 24 yo pt for an annual exam on 11/17/23 but no pap was done. Pt needs 3 annual paps due to being immunocompromised from the RA meds she is on before proceeding to paps every 3 years. Would you like pt to come back in for a pap? Other recommendation? Pap history below. Thanks!  Sandy Peck RN    Patient is taking Xeljanz and methotrexate for RA  11/15/21 NIL Pap. Plan annual pap for 2 more years due 11/15/22.  11/18/22 NIL Pap. Plan pap in 1 year. If NIL, then 3 years.   11/17/23 appt pap not done   12/18/23 Reminder Mychart    For immunocompromised: Screening should begin within 1 year of insertional sexual activity and continue throughout a pt's lifetime: annual for 3 years, then every 3 years (cytology only) until the age of 30 years, and then either continuing with cytology alone or cotesting every 3 years after the age of 30 years.  2019 ASCCP guideline recommendations (Journal of Lower Genital Track Disease, Vol.24, Number 2, April 2020, p. 124)

## 2024-01-30 DIAGNOSIS — Z30.011 ENCOUNTER FOR INITIAL PRESCRIPTION OF CONTRACEPTIVE PILLS: ICD-10-CM

## 2024-01-31 RX ORDER — DESOGESTREL AND ETHINYL ESTRADIOL 0.15-0.03
1 KIT ORAL DAILY
Qty: 84 TABLET | Refills: 4 | OUTPATIENT
Start: 2024-01-31

## 2024-01-31 NOTE — TELEPHONE ENCOUNTER
Script sent to the pharmacy on 11/17/23 for 84 tablets with 4 additional refills on file. Patient should have refills on file with the pharmacy.     Noris Mayorga RN

## 2024-02-02 DIAGNOSIS — Z30.011 ENCOUNTER FOR INITIAL PRESCRIPTION OF CONTRACEPTIVE PILLS: ICD-10-CM

## 2024-02-06 NOTE — TELEPHONE ENCOUNTER
Omer (listed as PCP),    Patient is lost to pap tracking follow-up. Attempts to contact pt have been made per reminder process and there has been no reply and/or no appt scheduled.      Sandy Peck RN  Pap Tracking     Patient is taking Xeljanz and methotrexate for RA  11/15/21 NIL Pap. Plan annual pap for 2 more years due 11/15/22.  11/18/22 NIL Pap. Plan pap in 1 year. If NIL, then 3 years.   11/17/23 appt pap not done - pt to schedule a pap later.  12/18/23 Reminder Mychart  1/8/2024 Reminder call - spoke to pt  2/6/2024 Lost to follow-up for pap tracking

## 2024-02-07 RX ORDER — DESOGESTREL AND ETHINYL ESTRADIOL 0.15-0.03
1 KIT ORAL DAILY
Qty: 84 TABLET | Refills: 4 | OUTPATIENT
Start: 2024-02-07

## 2024-06-20 ENCOUNTER — VIRTUAL VISIT (OUTPATIENT)
Dept: PSYCHOLOGY | Facility: CLINIC | Age: 24
End: 2024-06-20
Attending: PHYSICIAN ASSISTANT
Payer: COMMERCIAL

## 2024-06-20 DIAGNOSIS — F41.9 ANXIETY: Primary | ICD-10-CM

## 2024-06-20 PROCEDURE — 90791 PSYCH DIAGNOSTIC EVALUATION: CPT | Mod: 95 | Performed by: PSYCHOLOGIST

## 2024-06-20 ASSESSMENT — ANXIETY QUESTIONNAIRES
3. WORRYING TOO MUCH ABOUT DIFFERENT THINGS: NOT AT ALL
6. BECOMING EASILY ANNOYED OR IRRITABLE: SEVERAL DAYS
IF YOU CHECKED OFF ANY PROBLEMS ON THIS QUESTIONNAIRE, HOW DIFFICULT HAVE THESE PROBLEMS MADE IT FOR YOU TO DO YOUR WORK, TAKE CARE OF THINGS AT HOME, OR GET ALONG WITH OTHER PEOPLE: NOT DIFFICULT AT ALL
GAD7 TOTAL SCORE: 3
1. FEELING NERVOUS, ANXIOUS, OR ON EDGE: NOT AT ALL
5. BEING SO RESTLESS THAT IT IS HARD TO SIT STILL: MORE THAN HALF THE DAYS
GAD7 TOTAL SCORE: 3
2. NOT BEING ABLE TO STOP OR CONTROL WORRYING: NOT AT ALL
7. FEELING AFRAID AS IF SOMETHING AWFUL MIGHT HAPPEN: NOT AT ALL

## 2024-06-20 ASSESSMENT — PATIENT HEALTH QUESTIONNAIRE - PHQ9
10. IF YOU CHECKED OFF ANY PROBLEMS, HOW DIFFICULT HAVE THESE PROBLEMS MADE IT FOR YOU TO DO YOUR WORK, TAKE CARE OF THINGS AT HOME, OR GET ALONG WITH OTHER PEOPLE: NOT DIFFICULT AT ALL
SUM OF ALL RESPONSES TO PHQ QUESTIONS 1-9: 6
5. POOR APPETITE OR OVEREATING: NOT AT ALL
SUM OF ALL RESPONSES TO PHQ QUESTIONS 1-9: 6

## 2024-06-20 ASSESSMENT — COLUMBIA-SUICIDE SEVERITY RATING SCALE - C-SSRS
1. HAVE YOU WISHED YOU WERE DEAD OR WISHED YOU COULD GO TO SLEEP AND NOT WAKE UP?: NO
6. HAVE YOU EVER DONE ANYTHING, STARTED TO DO ANYTHING, OR PREPARED TO DO ANYTHING TO END YOUR LIFE?: NO
2. HAVE YOU ACTUALLY HAD ANY THOUGHTS OF KILLING YOURSELF?: NO
TOTAL  NUMBER OF ABORTED OR SELF INTERRUPTED ATTEMPTS LIFETIME: NO
ATTEMPT LIFETIME: NO
TOTAL  NUMBER OF INTERRUPTED ATTEMPTS LIFETIME: NO

## 2024-06-20 NOTE — PROGRESS NOTES
[facility]         PATIENT'S NAME: Geri THOMSON Rory  PREFERRED NAME: Geri  PRONOUNS: She/Her  MRN: [id number]  : 2000  ADDRESS[facility] [address] [place] [address]  [id number]  DATE OF SERVICE: 24  START TIME: 9:50  END TIME: 10:18  PREFERRED PHONE: [phone]  May we leave a program related message: Yes  EMERGENCY CONTACT: was not obtained  .  SERVICE MODALITY:  Video Visit:      Provider verified identity through the following two step process.  Patient provided:  Patient     Telemedicine Visit: The patient's condition can be safely assessed and treated via synchronous audio and visual telemedicine encounter.      Reason for Telemedicine Visit: Services only offered telehealth    Originating Site (Patient Location): Patient's home    Distant Site (Provider Location): [facility]    Consent:  The patient/guardian has verbally consented to: the potential risks and benefits of telemedicine (video visit) versus in person care; bill my insurance or make self-payment for services provided; and responsibility for payment of non-covered services.     Patient would like the video invitation sent by:  My Chart    Mode of Communication:  Video Conference via [facility]    Distant Location (Provider):  Off-site    As the provider I attest to compliance with applicable laws and regulations related to telemedicine.    UNIVERSAL ADULT Mental Health DIAGNOSTIC ASSESSMENT    Identifying Information:  Patient is a 23 year old,  Chinese individual.  Patient was referred for an assessment by self.  Patient attended the session alone.    Chief Complaint:   The purpose of this evaluation is to: provide treatment recommendations and clarify diagnosis. Patient reported seeking services at this time for diagnostic assessment and recommendations for treatment.  Patient reported that she  has not completed a previous ADHD diagnostic assessment.  Patient  "has not received a previous diagnosis of ADHD. Patient reported that medication has not been prescribed medication to address these problems. Client has anxiety about meeting deadlines at work. 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 She feels \"blind\" to time until it creeps up on her. She did very well in school and is doing great at work but doesn't manage her time well and this causes stress \"in the final push for things\" (e.g., studying for tests and finishing to meet deadlines). She will always be on her phone while watching TV. She is usually multi-tasking. She feels like she needs to be doing something productive and can't just sit and relax. She feels more impatient and irritable toward her parents (\"I get snappy with them and it is bothering them\"). Client likes to do crafts, exercising, etc. She has plans to follow through on things but then will lose interest (\"I get honed in on one thing and then I get bored and switch to something else\"). She hates leaving crafts unfinished, but it happens. She has \"organized chaos\" (her room looks messy but she knows where things are\"). She does not misplace or lose things. Client used to be punctual, but now she is always 10-15 minutes late for everything. Part of [place] (and it can be hard to plan for the commute). Client writes things in a calendar to remember appointments and she makes lists. She doesn't have significant concerns about her memory. She might forget to respond to a text if the notification goes away.  She sometimes struggles with motivation to get started on things and she will procrastinate.        Social/Family History:  Patient reported they grew up in [place] [place]  .  They were raised by biological parents  .  Parents were always together.  She was the second born of 2 children. She has an older brother. Patient reported that their childhood was \"good.\" She had " "everything she needed and parents were \"more strict\" with academics and social events. She felt safe and loved. Her family got along well.  Patient described their current relationships with family of origin as good.     The patient describes their cultural background as Chinese.  Cultural influences and impact on patient's life structure, values, norms, and healthcare: n/a.  Contextual influences on patient's health include: Contextual Factors: Family Factors family hasn't had MH treatment history and hasn't explored it in the past . These factors will be addressed in the Preliminary Treatment plan. Patient identified their preferred language to be English. Patient reported they does not need the assistance of an  or other support involved in therapy.     Patient reported had no significant delays in developmental tasks.   Patient's highest education level was college graduate  .  Patient identified the following learning problems: attention. Client felt she got distracted easily. If she was under stress or if there was a deadline, she would get it done. She would procrastinate if things weren't \"pressing\" and she wouldn't take it seriously right away.  Modifications will not be used to assist communication in therapy.  Patient reports they are not  able to understand written materials.    Patient reported the following relationship history: some dating but nothing long term.  Patient's current relationship status is single for 24.  Patient identified their sexual orientation as heterosexual.  Patient reported having 0 child(elysia). Patient identified parents; friends; co-worker as part of their support system.  Patient identified the quality of these relationships as good,  .      Patient's current living/housing situation involves staying with someone.  The immediate members of family and household include Delbert, Marry,Father and mother and they report that housing is stable.    Patient is currently " employed fulltime.  She works an . Patient reports their finances are obtained through employment. Patient does not identify finances as a current stressor.      Patient reported that they have not been involved with the legal system.  Patient does not report being under probation/ parole/ jurisdiction.     Patient's Strengths and Limitations:  Patient identified the following strengths or resources that will help them succeed in treatment: commitment to health and well being, family support, insight, intelligence, motivation, strong social skills, and work ethic. Things that may interfere with the patient's success in treatment include: none identified.     Assessments:  The following assessments were completed by patient for this visit:  PHQ9:       6/20/2024     9:40 AM   PHQ-9 SCORE   PHQ-9 Total Score MyChart 6 (Mild depression)   PHQ-9 Total Score 6     GAD7:       6/20/2024     9:59 AM   NIKITA-7 SCORE   Total Score 3     PROMIS 10-Global Health (all questions and answers displayed):       6/17/2024    10:16 AM   PROMIS 10   In general, would you say your health is: Good   In general, would you say your quality of life is: Very good   In general, how would you rate your physical health? Good   In general, how would you rate your mental health, including your mood and your ability to think? Good   In general, how would you rate your satisfaction with your social activities and relationships? Very good   In general, please rate how well you carry out your usual social activities and roles Good   To what extent are you able to carry out your everyday physical activities such as walking, climbing stairs, carrying groceries, or moving a chair? Completely   In the past 7 days, how often have you been bothered by emotional problems such as feeling anxious, depressed, or irritable? Sometimes   In the past 7 days, how would you rate your fatigue on average? Moderate   In the past 7 days, how would you rate your  pain on average, where 0 means no pain, and 10 means worst imaginable pain? 1   In general, would you say your health is: 3   In general, would you say your quality of life is: 4   In general, how would you rate your physical health? 3   In general, how would you rate your mental health, including your mood and your ability to think? 3   In general, how would you rate your satisfaction with your social activities and relationships? 4   In general, please rate how well you carry out your usual social activities and roles. (This includes activities at home, at work and in your community, and responsibilities as a parent, child, spouse, employee, friend, etc.) 3   To what extent are you able to carry out your everyday physical activities such as walking, climbing stairs, carrying groceries, or moving a chair? 5   In the past 7 days, how often have you been bothered by emotional problems such as feeling anxious, depressed, or irritable? 3   In the past 7 days, how would you rate your fatigue on average? 3   In the past 7 days, how would you rate your pain on average, where 0 means no pain, and 10 means worst imaginable pain? 1   Global Mental Health Score 14   Global Physical Health Score 15   PROMIS TOTAL - SUBSCORES 29     [place] Suicide Severity Rating Scale (Lifetime/Recent)      6/20/2024    10:04 AM   [place] Suicide Severity Rating (Lifetime/Recent)   Q1 Wish to be Dead (Lifetime) N   Q2 Non-Specific Active Suicidal Thoughts (Lifetime) N   Actual Attempt (Lifetime) N   Has subject engaged in non-suicidal self-injurious behavior? (Lifetime) N   Interrupted Attempts (Lifetime) N   Aborted or Self-Interrupted Attempt (Lifetime) N   Preparatory Acts or Behavior (Lifetime) N   Calculated C-SSRS Risk Score (Lifetime/Recent) No Risk Indicated           Personal and Family Medical History:  Patient does not report a family history of mental health concerns.  Patient reports family history includes Hyperlipidemia in  "her father; Hypertension in her father..     Patient does not report Mental Health Diagnosis or Treatment.  Generally, she is anxious but is \"high functioning and get a lot done.\" Lately work has been calmer so she is not as stressed. She can't sit still and is always doing something. Client noted that a lot of her stress is related to deadlines and work stress. During busy times she works 60-70 hour weeks and this is overwhelming (trying to get everything done) and this causes anxiety.     Patient has had a physical exam to rule out medical causes for current symptoms.  Date of last physical exam was within the past year. Client was encouraged to follow up with PCP if symptoms were to develop. The patient has a [facility] Provider, who is named Omer Waldron..  Patient reports the following current medical concerns: Rheumatoid Arthritis .  Patient reports pain concerns including RA.  Patient does not want help addressing pain concerns.  There are not significant appetite / nutritional concerns / weight changes.   Patient does not report a history of head injury / trauma / cognitive impairment.      Patient reports current meds as:   Current Outpatient Medications   Medication Sig Dispense Refill    desogestrel-ethinyl estradiol (APRI) 0.15-30 MG-MCG tablet Take 1 tablet by mouth daily May skip placebo week and start a new pack every 3 weeks. 84 tablet 4    folic acid (FOLVITE) 1 MG tablet       methotrexate 2.5 MG tablet       tofacitinib (XELJANZ) 11 MG 24 hr tablet Take 1 tablet (11 mg) by mouth in the morning. PLEASE SCHEDULE AN APPOINTEMNT 30 tablet 0     No current facility-administered medications for this visit.       Medication Adherence:  Patient reports taking.  taking prescribed medications as prescribed.    Patient Allergies:  No Known Allergies    Medical History:    Past Medical History:   Diagnosis Date    Arthritis of ankle, right 02/14/2016    Initial evaluation 2/24/16 after " 1.5 weeks of swelling, pain; elevated RF, CRP    Cyclic citrullinated peptide (CCP) antibody positive 03/07/2016    Elevated rheumatoid factor 02/24/2016    High total IgG 03/07/2016    GENE (juvenile idiopathic arthritis), polyarthritis, rheumatoid factor positive (H) 03/07/2016    Pneumonitis 01/01/2003    Positive antinuclear antibody 03/07/2016    Rheumatoid arthritis involving multiple sites with positive rheumatoid factor (H) 02/24/2016         Current Mental Status Exam:   Appearance:  Appropriate    Eye Contact:  Good   Psychomotor:  Normal       Gait / station:  no problem  Attitude / Demeanor: Cooperative   Speech      Rate / Production: Normal/ Responsive      Volume:  Normal  volume      Language:  no problems and good  Mood:   Normal  Affect:   Appropriate    Thought Content: Clear   Thought Process: Goal Directed  Logical       Associations: No loosening of associations  Insight:   Good   Judgment:  Intact   Orientation:  All  Attention/concentration: Good    Substance Use:   Patient did not report a family history of substance use concerns; see medical history section for details.  Patient has not received chemical dependency treatment in the past.  Patient has not ever been to detox.      Patient is not currently receiving any chemical dependency treatment.           Substance History of use Age of first use Date of last use     Pattern and duration of use (include amounts and frequency)   Alcohol currently use   17 06/15/24 REPORTS SUBSTANCE USE: reports using substance 2-3 times per week and has 1-3 drinks at a time.   Patient reports heaviest use was in college.   Cannabis   currently use 17 05/07/24 REPORTS SUBSTANCE USE: reports using substance 1 times per month and has 1 edible gummy at a time.   Patient reports heaviest use is current use.     Amphetamines   used in the past   03/01/24-caryn, MDMA (festivals) REPORTS SUBSTANCE USE: reports using substance 2-3 times per year and has 1 caryn/mdma  at a time.   Patient reports heaviest use is current use.   Cocaine/crack    used in the past 23  04/27/24  REPORTS SUBSTANCE USE: reports using substance 2-3 times per year and has 1 at a festival at a time.   Patient reports heaviest use is current use.   Hallucinogens never used         REPORTS SUBSTANCE USE: N/A   Inhalants never used         REPORTS SUBSTANCE USE: N/A   Heroin never used         REPORTS SUBSTANCE USE: N/A   Other Opiates never used     REPORTS SUBSTANCE USE: N/A   Benzodiazepine   never used     REPORTS SUBSTANCE USE: N/A   Barbiturates never used     REPORTS SUBSTANCE USE: N/A   Over the counter meds never used     REPORTS SUBSTANCE USE: N/A   Caffeine currently use dont recall   REPORTS SUBSTANCE USE: reports using substance 1 times per day and has 1-2 coffees at a time.   Patient reports heaviest use is current use.   Nicotine  never used     REPORTS SUBSTANCE USE: N/A   Other substances not listed above:  Identify:  never used     REPORTS SUBSTANCE USE: N/A     Patient reported the following problems as a result of their substance use: no problems, not applicable.    Substance Use: No symptoms    Based on the negative CAGE score and clinical interview there  are not indications of drug or alcohol abuse.    Significant Losses / Trauma / Abuse / Neglect Issues:   Patient did not  serve in the .  There are indications or report of significant loss, trauma, abuse or neglect issues related to: are no indications and client denies any losses, trauma, abuse, or neglect concerns.  Concerns for possible neglect are not present.     Safety Assessment:   Patient denies current homicidal ideation and behaviors.  Patient denies current self-injurious ideation and behaviors.    Patient denied risk behaviors associated with substance use.   Patient denies any high risk behaviors associated with mental health symptoms.  Patient reports the following current concerns for their personal safety:  None.  Patient reports there are not firearms in the house.         History of Safety Concerns:  Patient denied a history of homicidal ideation.     Patient denied a history of personal safety concerns.    Patient denied a history of assaultive behaviors.    Patient denied a history of sexual assault behaviors.     Patient denied a history of risk behaviors associated with substance use.  Patient denies any history of high risk behaviors associated with mental health symptoms.  Patient reports the following protective factors: forward or future oriented thinking; dedication to family or friends; safe and stable environment; regular sleep; sense of belonging; purpose; daily obligations; effective problem solving skills; commitment to well being; positive social skills; financial stability; strong sense of self worth or esteem; sense of personal control or determination    Risk Plan:  See Recommendations for Safety and Risk Management Plan    Review of Symptoms per patient report:   Depression: Change in sleep, Lack of interest, Change in energy level, Difficulties concentrating, Psychomotor slowing or agitation, and Feeling sad, down, or depressed  Bryanna:  No Symptoms  Psychosis: No Symptoms  Anxiety: Poor concentration and Irritability  Panic:  No symptoms  Post Traumatic Stress Disorder:  No Symptoms   Eating Disorder: No Symptoms  ADD / ADHD:  Inattentive, Poor task completion, Poor organizational skills, and Distractibility  Conduct Disorder: No symptoms  Autism Spectrum Disorder: No symptoms  Obsessive Compulsive Disorder: No Symptoms    Patient reports the following compulsive behaviors and treatment history:  no symptoms .      Diagnostic Criteria:   Unspecified Anxiety Disorder  The client does not report enough symptoms for the full criteria of any specific Anxiety Disorder to have been met   - Excessive anxiety and worry about a number of events or activities (such as work or school performance).    -  Restlessness or feeling keyed up or on edge.    - Difficulty concentrating or mind going blank.     Functional Status:  Patient reports the following functional impairments:  management of the household and or completion of tasks and work / vocational responsibilities.         Clinical Summary:  1. Psychosocial, Cultural and Contextual Factors: working full-time (stressful job doing taxes)  .  2. Principal DSM5 Diagnoses  (Sustained by DSM5 Criteria Listed Above):   300.09 (F41.8) Other Specified Anxiety Disorder .  RULE OUT: ADHD  5. Prognosis: Expect Improvement and Maintain Current Status / Prevent Deterioration.  6. Likely consequences of symptoms if not treated: issues at home/work.  7. Client strengths include:  educated, employed, goal-focused, good listener, insightful, intelligent, motivated, open to learning, open to suggestions / feedback, support of family, friends and providers, supportive, and wants to learn .     Recommendations:     1. Plan for Safety and Risk Management:   Safety and Risk: Recommended that patient call 911 or go to the local ED should there be a change in any of these risk factors..          Report to child / adult protection services was NA.      4. Resources/Service Plan:    services are not indicated.   Modifications to assist communication are not indicated.   Additional disability accommodations are not indicated.      5. Collaboration:   Collaboration / coordination of treatment will be initiated with the following  support professionals: primary care physician.      6.  Referrals:   The following referral(s) will be initiated:  NA .       A Release of Information has been obtained for the following:  NA .     Clinical Substantiation/medical necessity for the above recommendations:  Medical necessity criteria is warranted in order to: Measure a psychological disorder and its severity and functional impairment to determine psychiatric diagnosis when a mental illness  is suspected, or to achieve a differential diagnosis from a range of medical/psychological disorders that present with similar constellations of symptoms (e.g., determination and measurement of anxiety severity and impact in the presence of ongoing asthma or heart disease), Perform symptom measurement to objectively measure treatment effectiveness and/or determine the need to refer for pharmacological treatment or other medical evaluation (e.g., based on severity and chronicity of symptoms), and Evaluate primary symptoms of impaired attention and concentration that can occur in many neurological and psychiatric conditions. .    7. COLEEN:    COLEEN:  Discussed the general effects of drugs and alcohol on health and well-being. Provider gave patient printed information about the  effects of chemical use on their health and well being. Recommendations:  NA .     8. Records:   These were reviewed at time of assessment.   Information in this assessment was obtained from the medical record and  provided by patient who is a good historian.    Patient will have open access to their mental health medical record.    9.   Interactive Complexity: No    10. Safety Plan:       Provider Name/ Credentials:  Jennifer Dexter, PhD, LP  June 20, 2024

## 2024-06-26 ENCOUNTER — DOCUMENTATION ONLY (OUTPATIENT)
Dept: PSYCHOLOGY | Facility: CLINIC | Age: 24
End: 2024-06-26
Payer: COMMERCIAL

## 2024-06-26 NOTE — PROGRESS NOTES
Name:Parth Valadez  MRN:? [id number]  :? 2000  ?   Client completed the [place] Multiphasic Personality Inventory-3 (MMPI-3), a self-report personality inventory, as part of her evaluation. Validity scales indicate that the client was able to comprehend and respond relevantly to the test items. Client responded in an open and consistent manner, resulting in a valid profile. She reports being passive, indecisive, and inefficacious. She reports engaging in compulsive behavior including repetitive checking.

## 2024-06-27 ENCOUNTER — VIRTUAL VISIT (OUTPATIENT)
Dept: PSYCHOLOGY | Facility: CLINIC | Age: 24
End: 2024-06-27
Payer: COMMERCIAL

## 2024-06-27 ENCOUNTER — DOCUMENTATION ONLY (OUTPATIENT)
Dept: PSYCHOLOGY | Facility: CLINIC | Age: 24
End: 2024-06-27
Payer: COMMERCIAL

## 2024-06-27 DIAGNOSIS — F41.9 ANXIETY: Primary | ICD-10-CM

## 2024-06-27 PROCEDURE — 90832 PSYTX W PT 30 MINUTES: CPT | Mod: 95 | Performed by: PSYCHOLOGIST

## 2024-06-27 NOTE — PROGRESS NOTES
"Progress Note     Client Name:  [facility] Date: 6/27/2024           Service Type: Individual    Telemedicine Visit: The patient's condition can be safely assessed and treated via synchronous audio and visual telemedicine encounter.      Reason for Telemedicine Visit: Services only offered telehealth    Originating Site (Patient Location): Patient's home        Distant Location (provider location):  On-site - [facility]    Consent:  The patient/guardian has verbally consented to: the potential risks and benefits of telemedicine (video visit) versus in person care; bill my insurance or make self-payment for services provided; and responsibility for payment of non-covered services.     Mode of Communication:  Video Conference via [employer]    As the provider I attest to compliance with applicable laws and regulations related to telemedicine.       Session Start Time: 9:00  Session End Time: 9:17     Session Length: 17 minutes    Session #: 2     Attendees: Client attended alone     Intervention: reviewed strategies for managing anxiety; motivational interviewing: explored potential barriers for making healthy changes    Identifying Information:  Client is a 23 year old, Chinese, single female. Client was referred for a diagnostic assessment by PCP.  The purpose of this evaluation is to: provide treatment recommendations and clarify diagnosis.  Client is currently employed full time and reports she is able to function appropriately at work.. Client attended the session alone.       Client's Statement of Presenting Concern:  Client reported seeking services at this time for diagnostic assessment and recommendations for treatment. Client's presenting concerns include: Client has anxiety about meeting deadlines at work. 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 She feels \"blind\" to time until it creeps up on her. She did very well in school and is doing great at work but doesn't manage her " "time well and this causes stress \"in the final push for things\" (e.g., studying for tests and finishing to meet deadlines). She will always be on her phone while watching TV. She is usually multi-tasking. She feels like she needs to be doing something productive and can't just sit and relax. She feels more impatient and irritable toward her parents (\"I get snappy with them and it is bothering them\"). Client likes to do crafts, exercising, etc. She has plans to follow through on things but then will lose interest (\"I get honed in on one thing and then I get bored and switch to something else\"). She hates leaving crafts unfinished, but it happens. She has \"organized chaos\" (her room looks messy but she knows where things are\"). She does not misplace or lose things. Client used to be punctual, but now she is always 10-15 minutes late for everything. Part of this [place] (and it can be hard to plan for the commute). Client writes things in a calendar to remember appointments and she makes lists. She doesn't have significant concerns about her memory. She might forget to respond to a text if the notification goes away (\"if it isn't pressing then I won't bother\"). She sometimes struggles with motivation to get started on things and she will procrastinate. Client can listen in conversations. She doesn't usually interrupt people. Client will typically complete one thing before moving onto the next. Sometimes she needs the answer to a question before she can move forward (she likes to do things in an organized manner). She is able to pivot and respond to a quick Teams message or email and resume her work. If someone has another project for her, she will write it down on a list and get to it later. Client stated that symptoms have resulted in the following functional impairments: management of the household and or completion of tasks and work / vocational " "responsibilities.       History of Presenting Concern:  Client reported that she has not completed a previous ADHD diagnostic assessment.  Client has not received a previous diagnosis of ADHD.  Client reported that medication has not been prescribed medication to address these problems. Client reported that these problem(s) began toward the end of college. She stated, \"None of my behaviors impacted my life. I was a great student and I do great work at my job. I don't have problems with my functioning and getting things done and doing things.\" She wonders if some of her habits are \"normal.\" She feels her parents have an expectation of her to \"be good.\" She stated, \"They don't put that pressure on me but I don't want to disappoint them. Nobody is forcing me to. I could have gone rogue in high school, but I knew I had to go to college and be successful because my parents worked so hard so that I could have opportunities and I didn't want to disappoint them.\" Client has not attempted to resolve these concerns in the past. Client reported that other professional(s) are not involved in providing support / services.       Social History:  As a child, client reported that she failed to complete assigned chores in the home environment. Client wanted to get chores done in her own time and didn't like being reminded to do them (\"I was going to get to it and it was on my list of things to do, but now that someone nagged me about it, I don't want to do it anymore\"). Client reported no difficulty with childhood peer relationships.  As a child, client reported having regular and consistent sleep patterns.  Client reported currently experiencing regular and consistent sleep patterns. She goes to bed late and likes to sleep. Client reported sleeping approximately 6 hours per night.  Client reported that she has not completed a sleep study.  Client reported having a well balanced diet.  There are not significant nutritional concerns. " " Client reported sporadic exercise patterns.      Client's highest education level was college graduate. Client graduated high school in 2018 with almost a 4.0 GPA. She estimated she obtained mostly As. During the elementary, middle, and high school years, patient recalls academic strengths in the area of math and science. Client reported experiencing academic problems in reading and writing (English). Client did identify the following learning problems: attention. Client did not receive tutoring services during the school years. Client did not receive special education services. Client reported no particular problems during the school years. Client felt she got distracted easily. If she was under stress or if there was a deadline, she would get it done. She would procrastinate if things weren't \"pressing\" and she wouldn't take it seriously right away. She was able to get things done and turn things in on time (\"I was good with that\"). Client was able to follow along and focus in class. She would take notes and doodle a but on the margins next to her notes. Client did attend post-secondary school. She graduated from college in 2021 from [employer] with a degree in finance and accounting. Client took a lot of AP classes in high school and this counted toward college credits. Client reported she had a 3.8 GPA in college (mostly As). Client reported that she did well with assignments and finished things on time. She did not have attendance issues. She was able to listen in class and take notes.     Client reported that she is currently employed full-time.  She works as an . She has held this job for almost 2 years. Client reported that the current job is a good fit for her skills and personality.  Client reported that she distractible behavior .  Her job is very detail oriented and involves a lot of numbers and information to keep track of. She usually doesn't make big mistakes (once, there was an error before a " "deadline but she was able to fix it). Usually, there haven't been big mistakes. Her job is pretty engaging. She is able to meet deadlines. She has a good system in place to be organized, but there are a lot of projects to keep track of (especially compared to her colleagues). She can be distracted when she is in the office. She will talk to her co-workers or go get coffee or a snack.  She will listen to a podcast and this helps her to focus on her work (\"this helps to take care of the social aspect so I can get my work done\"). The client's work history includes: , internships for a couple of months.The longest period of employment has been two years (current job). Client has not been terminated from a place of employment.       Risk Taking Behaviors:  Client reported no history of risk taking behaviors - she thinks things through quite a bit and considers possible scenarios that could play out      Motor Vehicle Operation:  Client has received a 's license.  Client has not received any moving violations.  Client reported the following driving habits: attentive and cautious.  According to client, other people are comfortable riding as a passenger when she is driving.        Mental Status Assessment:  Appearance:   Appropriate   Eye Contact:   Good   Psychomotor Behavior: Normal   Attitude:   Cooperative   Orientation:   All  Speech   Rate / Production: Normal    Volume:  Normal   Mood:    Normal  Affect:    Appropriate   Thought Content:  Clear   Thought Form:  Coherent  Logical   Insight:    Good       Review of Symptoms:  Depression:     Change in sleep, Lack of interest, Change in energy level, Difficulties concentrating, Psychomotor slowing or agitation, and Feeling sad, down, or depressed  Bryanna:             No Symptoms  Psychosis:       No Symptoms  Anxiety:           Poor concentration and Irritability  Panic:              No symptoms  Post Traumatic Stress Disorder:  No Symptoms   Eating " Disorder:          No Symptoms  ADD / ADHD:              Inattentive, Poor task completion, Poor organizational skills, and Distractibility  Conduct Disorder:       No symptoms  Autism Spectrum Disorder:     No symptoms  Obsessive Compulsive Disorder:       No Symptoms  Reckless Behavior: No symptoms        Safety Issues and Plan for Safety and Risk Management:  Client denies a history of suicidal ideation, suicide attempts, self-injurious behavior, homicidal ideation, homicidal behavior, and and other safety concerns    Client denies current fears or concerns for personal safety.  Client denies current or recent suicidal ideation or behaviors.  Client denies current or recent homicidal ideation or behaviors.  Client denies current or recent self injurious behavior or ideation.  Client denies other safety concerns.  Client reports there are no firearms in the house.  Recommended that patient call 911 or go to the local ED should there be a change in any of these risk factors.        Diagnostic Criteria:      Unspecified Anxiety Disorder  The client does not report enough symptoms for the full criteria of any specific Anxiety Disorder to have been met   - Excessive anxiety and worry about a number of events or activities (such as work or school performance).    - Restlessness or feeling keyed up or on edge.    - Difficulty concentrating or mind going blank.     Functional Status:  Client's symptoms have caused reduced functional status in the following areas: management of the household and or completion of tasks and work / vocational responsibilities.         DSM-5Diagnoses: (Sustained by DSM5 Criteria Listed Above)    300.09 (F41.8) Other Specified Anxiety Disorder     RULE OUT: ADHD    Attendance Agreement:  Client has signed Attendance Agreement:No: unable to sign via telehealth      Preliminary Plan:  The client reports no currently identified Protestant, ethnic or cultural issues relevant to  therapy.     services are not indicated.    Modifications to assist communication are not indicated.    Collaboration / coordination of treatment will be initiated with the following support professionals: primary care physician.    Referral to another professional/service is not indicated at this time..    A Release of Information is not needed at this time.    Client was given self and collaborative rating scales to be completed prior to the next appointment.  Client consented to sending/receiving these measures via email. Depression and anxiety rating scales were completed.  A third appointment was not scheduled at this time.    Report to child / adult protection services was NA.    Patient will have open access to their mental health medical record.    Jennifer Dexter, PhD, LP  June 27, 2024

## 2024-06-27 NOTE — PROGRESS NOTES
"Name: Geri Valadez   MRN: [id number]  : 2000    Josy Adult ADHD Rating Scale-IV: Self and Other Reports (BAARS-IV)  The BAARS-IV assesses for symptoms of ADHD that are experienced in one's daily life. This assessment measure includes self and collateral rating scales designed to provide information regarding current and childhood symptoms of ADHD including inattention, hyperactivity, and impulsivity. Self-report scores are reported as percentiles. Scores at the 76th-83rd percentile are considered marginal, scores at the 84th-92nd percentile are considered borderline, scores at the 93rd-95th percentile are considered mild, scores at the 96th-98th percentile are considered moderate, and those at the 99th percentile are considered severe. Collateral or \"other\" rating scales are reported as number of symptoms observed in comparison to those reported by the client. Norms and percentile scores are not available for collateral reports.      Current Symptoms Scale--Self Report:   Client completed the self-report inventory of current symptoms. The results indicate that the client's Total ADHD Score was 28 which places them in the 51-75th percentile for overall ADHD symptoms. In addition, the client endorsed the following occur \"often\" or \"very often\": 1/9 (85th percentile) Inattention symptoms, 1/9 (80th percentile) Hyperactivity/Impulsivity symptoms, and 3/9 (88th percentile) Sluggish Cognitive Tempo symptoms. Overall, the results suggest the client is not reporting symptoms of ADHD at this time.      Current Symptoms Scale--Other Report:  Client's cousin completed the collateral report inventory of current symptoms. Based on the collateral contact's observation of symptoms, the client demonstrates the following \"often\" or \"very often\": 0/9 Inattention symptoms, 0/5 Hyperactivity symptoms, 0/4 Impulsivity symptoms, and 1/9 Sluggish Cognitive Tempo symptoms. The client's Total ADHD Score was 21. The collateral- and " "self-report scores are similar and suggest Client does not experiences symptoms of ADHD at this time.    Childhood Symptoms Scale--Self-Report:  Client completed the self-report inventory of childhood symptoms. The results indicate that the client's Total ADHD Score was 28 which places them in the 51-75th percentile for overall ADHD symptoms in childhood. In addition, the client endorsed having experienced the following \"often\" or \"very often\": 1/9 (79th percentile) Inattention symptoms and 1/9 (78th percentile) Hyperactivity-Impulsivity symptoms. Overall, the results suggest the client did not experience symptoms of ADHD in childhood.    Childhood Symptoms Scale--Other Report:  Client's mother completed the collateral report inventory of childhood symptoms. Based on the collateral contact's recollection of client's childhood symptoms, the client demonstrated the following \"often\" or \"very often\": 1/9 Inattention symptoms and 1/9 Hyperactivity-Impulsivity symptoms. The client's Total ADHD Score was 29. The collateral-report and self-report scores are similar and suggest Client did not experience symptoms of ADHD in childhood.     Josy Functional Impairment Scale: Self and Other Reports (BFIS)  The BFIS is used to assess an individuals' psychosocial impairment in major life/daily activities that may be due to a mental health disorder. This assessment measure includes self and collateral rating scales. Self-report scores are reported as percentiles. Scores at the 76th-83rd percentile are considered marginal, scores at the 84th-92nd percentile are considered borderline, scores at the 93rd-95th percentile are considered mild, scores at the 96th-98th percentile are considered moderate, and those at the 99th percentile are considered severe. Collateral or \"other\" rating scales are reported as number of symptoms observed in comparison to those reported by the client. Norms and percentile scores are not available for " "collateral reports.      Results indicate the client did not identify impairment (scores at or greater than 93rd percentile) in any areas. The client's Mean Impairment Score was 1.71 (1-50th percentile) indicating the client is not reporting impairment in functioning across domains. Client's collateral contact did not complete this portion of the measure.    Josy Deficits in Executive Functioning Scale (BDEFS)  The BDEFS is a measure used for evaluating dimensions of adult executive functioning in daily life. This assessment measure includes self and collateral rating scales. Self-report scores are reported as percentiles. Scores at the 76th-83rd percentile are considered marginal, scores at the 84th-92nd percentile are considered borderline, scores at the 93rd-95th percentile are considered mild, scores at the 96th-98th percentile are considered moderate, and those at the 99th percentile are considered severe. Collateral or \"other\" rating scales are reported as number of symptoms observed in comparison to those reported by the client. Norms and percentile scores are not available for collateral reports.      Results indicate the client's Total Executive Functioning Score was 143 (51-75th percentile). The ADHD-Executive Functioning Index score was 20 (51-75th percentile). These scores suggest the client is not reporting deficits in executive functioning. Client's cousin completed the collateral report which indicated similar results. They did not notice deficits in any areas.    Generalized Anxiety Disorder Questionnaire (NIKITA-7)  This questionnaire is designed to screen for anxiety in adults. Based on the client's score of 4, they are not reporting symptoms of anxiety at this time.      Patient Health Questionnaire- 9 (PHQ-9)   This questionnaire is designed to screen for depression in adults. Based on the client's score of 5, they are reporting mild symptoms of depression at this time. Client identified the " following symptoms of depression: little interest or pleasure in doing things; trouble falling asleep/staying asleep/sleeping too much; feeling tired or having little energy; and poor concentration.

## 2024-06-28 ENCOUNTER — DOCUMENTATION ONLY (OUTPATIENT)
Dept: PSYCHOLOGY | Facility: CLINIC | Age: 24
End: 2024-06-28
Payer: COMMERCIAL

## 2024-06-28 NOTE — PROGRESS NOTES
[facility]   ADHD Evaluation      Patient:  Geri Valadez  YOB: 2000  MRN: [id number]     Date(s) of assessment: Diagnostic Assessment (6/20/24; 6/27/24), Josy self-report and collateral measures scored and interpreted (6/27/24), MMPI-3 (administered on 6/24/24, interpreted on 6/26/24)     Information about appointment:   Client attended two sessions to aid in determining client's mental health diagnosis or diagnoses and treatment recommendations that best address client concerns. Available medical records were reviewed. There were no previous psychological evaluations for review. A diagnostic assessment was conducted at the initial appointment. Client completed several rating scales to assist in assessing attention-related and other mental health symptoms that may be causing impairments in functioning. Rating scales were also completed by a collateral contact. Personality testing was also completed to aid in diagnostic clarification.   ?   Assessment tools:    Josy Adult ADHD Rating Scale-IV: Self and Other Reports (BAARS-IV), Josy Functional Impairment Scale: Self and Other Reports (BFIS), Josy Deficits in Executive Functioning Scale: Self and Other Reports (BDEFS), Patient Health Questionnaire-9 (PHQ-9), and Generalized Anxiety Disorder-7 (NIKITA-7); [place] Multiphasic Personality Inventory-Third Edition (MMPI-3) *Testing administered remotely      Assessment Results:   Behavioral Observations:   Client arrived to each session on-time. She was pleasant and cooperative at all times. Client did not demonstrate significant difficulties with inattention during the sessions. The following results are likely to be an accurate reflection of client's current functioning.       Josy Adult ADHD Rating Scale-IV: Self and Other Reports (BAARS-IV)  The BAARS-IV assesses for symptoms of ADHD that are experienced in one's daily life. This assessment measure includes self and collateral  "rating scales designed to provide information regarding current and childhood symptoms of ADHD including inattention, hyperactivity, and impulsivity. Self-report scores are reported as percentiles. Scores at the 76th-83rd percentile are considered marginal, scores at the 84th-92nd percentile are considered borderline, scores at the 93rd-95th percentile are considered mild, scores at the 96th-98th percentile are considered moderate, and those at the 99th percentile are considered severe. Collateral or \"other\" rating scales are reported as number of symptoms observed in comparison to those reported by the client. Norms and percentile scores are not available for collateral reports.      Current Symptoms Scale--Self Report:   Client completed the self-report inventory of current symptoms. The results indicate that the client's Total ADHD Score was 28 which places them in the 51-75th percentile for overall ADHD symptoms. In addition, the client endorsed the following occur \"often\" or \"very often\": 1/9 (85th percentile) Inattention symptoms, 1/9 (80th percentile) Hyperactivity/Impulsivity symptoms, and 3/9 (88th percentile) Sluggish Cognitive Tempo symptoms. Overall, the results suggest the client is not reporting symptoms of ADHD at this time.      Current Symptoms Scale--Other Report:  Client's cousin completed the collateral report inventory of current symptoms. Based on the collateral contact's observation of symptoms, the client demonstrates the following \"often\" or \"very often\": 0/9 Inattention symptoms, 0/5 Hyperactivity symptoms, 0/4 Impulsivity symptoms, and 1/9 Sluggish Cognitive Tempo symptoms. The client's Total ADHD Score was 21. The collateral- and self-report scores are similar and suggest Client does not experiences symptoms of ADHD at this time.     Childhood Symptoms Scale--Self-Report:  Client completed the self-report inventory of childhood symptoms. The results indicate that the client's Total ADHD " "Score was 28 which places them in the 51-75th percentile for overall ADHD symptoms in childhood. In addition, the client endorsed having experienced the following \"often\" or \"very often\": 1/9 (79th percentile) Inattention symptoms and 1/9 (78th percentile) Hyperactivity-Impulsivity symptoms. Overall, the results suggest the client did not experience symptoms of ADHD in childhood.     Childhood Symptoms Scale--Other Report:  Client's mother completed the collateral report inventory of childhood symptoms. Based on the collateral contact's recollection of client's childhood symptoms, the client demonstrated the following \"often\" or \"very often\": 1/9 Inattention symptoms and 1/9 Hyperactivity-Impulsivity symptoms. The client's Total ADHD Score was 29. The collateral-report and self-report scores are similar and suggest Client did not experience symptoms of ADHD in childhood.     Josy Functional Impairment Scale: Self and Other Reports (BFIS)  The BFIS is used to assess an individuals' psychosocial impairment in major life/daily activities that may be due to a mental health disorder. This assessment measure includes self and collateral rating scales. Self-report scores are reported as percentiles. Scores at the 76th-83rd percentile are considered marginal, scores at the 84th-92nd percentile are considered borderline, scores at the 93rd-95th percentile are considered mild, scores at the 96th-98th percentile are considered moderate, and those at the 99th percentile are considered severe. Collateral or \"other\" rating scales are reported as number of symptoms observed in comparison to those reported by the client. Norms and percentile scores are not available for collateral reports.      Results indicate the client did not identify impairment (scores at or greater than 93rd percentile) in any areas. The client's Mean Impairment Score was 1.71 (1-50th percentile) indicating the client is not reporting impairment in " "functioning across domains. Client's collateral contact did not complete this portion of the measure.     Josy Deficits in Executive Functioning Scale (BDEFS)  The BDEFS is a measure used for evaluating dimensions of adult executive functioning in daily life. This assessment measure includes self and collateral rating scales. Self-report scores are reported as percentiles. Scores at the 76th-83rd percentile are considered marginal, scores at the 84th-92nd percentile are considered borderline, scores at the 93rd-95th percentile are considered mild, scores at the 96th-98th percentile are considered moderate, and those at the 99th percentile are considered severe. Collateral or \"other\" rating scales are reported as number of symptoms observed in comparison to those reported by the client. Norms and percentile scores are not available for collateral reports.      Results indicate the client's Total Executive Functioning Score was 143 (51-75th percentile). The ADHD-Executive Functioning Index score was 20 (51-75th percentile). These scores suggest the client is not reporting deficits in executive functioning. Client's cousin completed the collateral report which indicated similar results. They did not notice deficits in any areas.        Summary of [place] Multiphasic Personality Inventory--Third Edition    Client completed the [place] Multiphasic Personality Inventory-3 (MMPI-3), a self-report personality inventory, as part of her evaluation. Validity scales indicate that the client was able to comprehend and respond relevantly to the test items. Client responded in an open and consistent manner, resulting in a valid profile. She reports being passive, indecisive, and inefficacious. She reports engaging in compulsive behavior including repetitive checking.      Generalized Anxiety Disorder Questionnaire (NIKITA-7)  This questionnaire is designed to screen for anxiety in adults. Based on the client's score of 4, they " "are not reporting symptoms of anxiety at this time.      Patient Health Questionnaire- 9 (PHQ-9)   This questionnaire is designed to screen for depression in adults. Based on the client's score of 5, they are reporting mild symptoms of depression at this time. Client identified the following symptoms of depression: little interest or pleasure in doing things; trouble falling asleep/staying asleep/sleeping too much; feeling tired or having little energy; and poor concentration.       Summary (based on clinical interview, review of records, test results):   Client is a 23-year-old, Chinese, single female. Client was referred for a diagnostic assessment by PCP.  The purpose of this evaluation is to: provide treatment recommendations and clarify diagnosis. Client's presenting concerns include: Client has anxiety about meeting deadlines at work. 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 She feels \"blind\" to time until it creeps up on her. She did very well in school and is doing great at work but doesn't manage her time well and this causes stress \"in the final push for things\" (e.g., studying for tests and finishing to meet deadlines). She will always be on her phone while watching TV. She is usually multi-tasking. She feels like she needs to be doing something productive and can't just sit and relax. She feels more impatient and irritable toward her parents (\"I get snappy with them and it is bothering them\"). Client likes to do crafts, exercising, etc. She has plans to follow through on things but then will lose interest (\"I get honed in on one thing and then I get bored and switch to something else\"). She hates leaving crafts unfinished, but it happens. She has \"organized chaos\" (her room looks messy but she knows where things are\"). She does not misplace or lose things. Client used to be punctual, but now she is always 10-15 minutes late for everything. Part of this is because she [place] further away from her friends (and it can be hard to plan for the commute). Client writes things in a calendar to remember appointments and she makes lists. She doesn't have significant concerns about her memory. She might forget to respond to a text if the notification goes away (\"if it isn't pressing then I won't bother\"). She sometimes struggles with motivation to get started on things and she will procrastinate. Client can listen in conversations. She doesn't usually interrupt people. Client will typically complete one thing before moving onto the next. Sometimes she needs the answer to a question before she can move forward (she likes to do things in an organized manner). She is able to pivot and respond to a quick Teams message or email and resume her work. If someone has another project for her, she will write it down on a list and get to it later. Client stated that symptoms have resulted in the following functional impairments: management of the household and or completion of tasks and work / vocational responsibilities. Client reported that she has not completed a previous ADHD diagnostic assessment.  Client has not received a previous diagnosis of ADHD.  Client reported that medication has not been prescribed medication to address these problems. Client reported that these problem(s) began toward the end of college. She stated, \"None of my behaviors impacted my life. I was a great student and I do great work at my job. I don't have problems with my functioning and getting things done and doing things.\" She wonders if some of her habits are \"normal.\" She feels her parents have an expectation of her to \"be good.\" She stated, \"They don't put that pressure on me but I don't want to disappoint them. Nobody is forcing me to. I could have gone rogue in high school, but I knew I had to go to college and be successful because my parents worked so hard so that I could have opportunities and I didn't want to " "disappoint them.\" Client has not attempted to resolve these concerns in the past. Client reported that other professional(s) are not involved in providing support / services. She did not report a personal history of chemical dependence.    Client reported she grew up in [place]. She was raised by her biological parents. Her parents were always together. She was the second born of two children. She has an older brother. Client reported that their childhood was \"good.\" She had everything she needed and parents were \"more strict\" with academics and social events. She felt safe and loved. Her family got along well. Client described their current relationships with family of origin as good. Client reported the following relationship history: some dating but nothing long term. Client's current relationship status is single for 24. Client identified their sexual orientation as heterosexual. She does not have children. Client identified parents; friends; co-worker as part of their support system. Client identified the quality of these relationships as good.     As a child, client reported that she failed to complete assigned chores in the home environment. Client wanted to get chores done in her own time and didn't like being reminded to do them (\"I was going to get to it and it was on my list of things to do, but now that someone nagged me about it, I don't want to do it anymore\"). Client reported no difficulty with childhood peer relationships. As a child, client reported having regular and consistent sleep patterns. Client reported currently experiencing regular and consistent sleep patterns. She goes to bed late and likes to sleep. Client reported sleeping approximately 6 hours per night. Client reported that she has not completed a sleep study.      Client's highest education level was college graduate. Client graduated high school in 2018 with almost a 4.0 GPA. She estimated she obtained mostly As. During the " "elementary, middle, and high school years, Client recalls academic strengths in the area of math and science. Client reported experiencing academic problems in reading and writing (English). Client did identify the following learning problems: attention. Client did not receive tutoring services during the school years. Client did not receive special education services. Client reported no particular problems during the school years. Client felt she got distracted easily. If she was under stress or if there was a deadline, she would get it done. She would procrastinate if things weren't \"pressing\" and she wouldn't take it seriously right away. She was able to get things done and turn things in on time (\"I was good with that\"). Client was able to follow along and focus in class. She would take notes and doodle a but on the margins next to her notes. Client did attend post-secondary school. She graduated from college in 2021 from [employer] with a degree in finance and accounting. Client took a lot of AP classes in high school and this counted toward college credits. Client reported she had a 3.8 GPA in college (mostly As). Client reported that she did well with assignments and finished things on time. She did not have attendance issues. She was able to listen in class and take notes.      Client reported that she is currently employed full-time.  She works as an . She has held this job for almost two years. Client reported that the current job is a good fit for her skills and personality.  Client reported that she distractible behavior. Her job is very detail oriented and involves a lot of numbers and information to keep track of. She usually doesn't make big mistakes (once, there was an error before a deadline but she was able to fix it). Usually, there haven't been big mistakes. Her job is pretty engaging. She is able to meet deadlines. She has a good system in place to be organized, but there are a lot of " "projects to keep track of (especially compared to her colleagues). She can be distracted when she is in the office. She will talk to her co-workers or go get coffee or a snack.  She will listen to a podcast and this helps her to focus on her work (\"this helps to take care of the social aspect so I can get my work done\"). The client's work history includes: , internships for a couple of months. The longest period of employment has been two years (current job). Client has not been terminated from a place of employment.     Results of testing were not indicative of ADHD. Rating scales suggested the client did not report experiencing symptoms of ADHD at this time or in childhood. The collateral reports (cousin and mother) were commensurate and did not indicate current or childhood symptoms of ADHD. Further, deficits in executive functioning and impairment in functioning were not reported. Personality testing was positive for indecision, passivity and repetitive checking. Self-report measures suggest Client experiences mild depression at this time. Based on the results of clinical interview and psychological testing, the client currently meets criteria for diagnosis of Other Specified Anxiety Disorder. Client will be provided with the results of testing, diagnosis, and recommendations in her last appointment.      DSM5 Diagnoses: (Sustained by DSM5 Criteria Listed Above)      Other Specified Anxiety Disorder (F41.9)      Psychosocial & Contextual Factors: working full-time    Recommendations:      1. Schedule a medication evaluation with your physician. Medications are often beneficial in treating anxiety symptoms.  ??? ???   2. Individual therapy is recommended. Therapies focused on identifying and challenging problematic thought and behavior patterns while increasing the use of healthy coping skills has been found to be effective in treating anxiety. It will be important to set goals in this therapy and work " actively toward achieving short-term successes that lead to the completion of each goal. Action-oriented therapies, such as CBT and ACT (Acceptance and Commitment Therapy) are particularly recommended for the treatment of chronic anxiety.    ??   3. The use of behavioral strategies such as diaphragmatic breathing, guided imagery, and mindfulness is often helpful in the management of anxiety symptoms.?   ?   4. ?The following compensatory strategies may be useful to cope with reported inattention symptoms:??   a. Maintaining a predictable routine and structured environment that incorporates prioritized checklists and reminders (e.g., Post-Its).?   b. When completing tasks, try to focus on one task at a time and complete it in its entirety before moving on to the next task.?   c. Minimize background distractions when working on complex tasks. For example, TV, radio or ongoing conversations in the background may hinder ability to focus on the task at hand.?   d. Take regular breaks from tasks that require prolonged attention. In general, regular breaks from complex tasks can help prevent lapses in attention, which can result in errors.?   e. Outline the steps required to complete a task prior to beginning it, which can help ensure an organized approach. Use the outline to refer to throughout the task as a reminder of the steps to be completed.?   ?   Jennifer Dexter, PhD, LP?   Licensed Psychologist?

## 2024-07-02 ENCOUNTER — VIRTUAL VISIT (OUTPATIENT)
Dept: PSYCHOLOGY | Facility: CLINIC | Age: 24
End: 2024-07-02
Payer: COMMERCIAL

## 2024-07-02 DIAGNOSIS — F41.9 ANXIETY: Primary | ICD-10-CM

## 2024-07-02 PROCEDURE — 96130 PSYCL TST EVAL PHYS/QHP 1ST: CPT | Mod: 93 | Performed by: PSYCHOLOGIST

## 2024-07-02 PROCEDURE — 96131 PSYCL TST EVAL PHYS/QHP EA: CPT | Mod: 93 | Performed by: PSYCHOLOGIST

## 2024-07-02 NOTE — PROGRESS NOTES
"Client Name:  Geri Valadez  Date: 7/2/24      Service Type: Individual (ADHD Evaluation feedback session)   ?   Session Start Time: 11:20 Session End Time: 11:40     ?   Session Length: 20 minutes    ?   Session #: (feedback)   ?   Attendees: Client attended alone     The patient has been notified of the following:      \"We have found that certain health care needs can be provided without the need for a face to face visit.  This service lets us provide the care you need with a phone conversation.       I will have full access to your [facility] medical record during this entire phone call.   I will be taking notes for your medical record.      Since this is like an office visit, we will bill your insurance company for this service.       There are potential benefits and risks of telephone visits (e.g. limits to patient confidentiality) that differ from in-person visits.?  Confidentiality still applies for telephone services, and nobody will record the visit.  It is important to be in a quiet, private space that is free of distractions (including cell phone or other devices) during the visit.??      If during the course of the call I believe a telephone visit is not appropriate, you will not be charged for this service\"     Consent has been obtained for this service by care team member: Yes     ?   DATA   ?   ?   Treatment Objective(s) Addressed in This Session:    Provided feedback on ADHD evaluation. Reviewed test results in depth and answered client's questions. Client diagnosed with Other Specified Anxiety Disorder. This provider also completed full written report of evaluation, including integration of testing data, summary, and recommendations. Please see Documentation Only dated 6/28/24.   ?     Progress on / Status of Treatment Objective(s) / Homework:    Completed    ?     Intervention:   ADHD Evaluation feedback; Reviewed report (can be found in Documentation Only encounter dated 6/28/24); Client was " appreciative of the feedback and expressed understanding of the diagnosis.    ?  ?   ASSESSMENT: Current Emotional / Mental Status (status of significant symptoms):   Risk status (Self / Other harm or suicidal ideation)   Client denies current fears or concerns for personal safety.   Client denies current or recent suicidal ideation or behaviors.   Client denies current or recent homicidal ideation or behaviors.   Client denies current or recent self-injurious behavior or ideation.   Client denies other safety concerns.   A safety and risk management plan has not been developed at this time, however client was given the after-hours number / 911 should there be a change in any of these risk factors.   ?   Appearance: Unable to assess on phone  Eye Contact: Unable to assess on phone   Psychomotor Behavior: Unable to assess on phone   Attitude: Cooperative    Orientation: All   Speech   Rate / Production: Normal    Volume: Normal    Mood: Normal   Affect: Appropriate    Thought Content: Clear    Thought Form: Coherent Logical    Insight: Good    ?   Medication Review:   Client is not prescribed psychiatric medications     Medication Compliance:   NA  ?   Changes in Health Issues:   None reported   ?   Chemical Use Review:   Substance Use: Chemical use reviewed, no active concerns identified  ?   Tobacco Use: No current tobacco use.    ?   Collateral Reports Completed:   Routed note to Care Team Member(s)   ?   PLAN: (Homework, other)   ?   Recommendations:      1. Schedule a medication evaluation with your physician. Medications are often beneficial in treating anxiety symptoms.  ??? ???   2. Individual therapy is recommended. Therapies focused on identifying and challenging problematic thought and behavior patterns while increasing the use of healthy coping skills has been found to be effective in treating anxiety. It will be important to set goals in this therapy and work actively toward achieving short-term  successes that lead to the completion of each goal. Action-oriented therapies, such as CBT and ACT (Acceptance and Commitment Therapy) are particularly recommended for the treatment of chronic anxiety.    ??   3. The use of behavioral strategies such as diaphragmatic breathing, guided imagery, and mindfulness is often helpful in the management of anxiety symptoms.?   ?   4. ?The following compensatory strategies may be useful to cope with reported inattention symptoms:??   a. Maintaining a predictable routine and structured environment that incorporates prioritized checklists and reminders (e.g., Post-Its).?   b. When completing tasks, try to focus on one task at a time and complete it in its entirety before moving on to the next task.?   c. Minimize background distractions when working on complex tasks. For example, TV, radio or ongoing conversations in the background may hinder ability to focus on the task at hand.?   d. Take regular breaks from tasks that require prolonged attention. In general, regular breaks from complex tasks can help prevent lapses in attention, which can result in errors.?   e. Outline the steps required to complete a task prior to beginning it, which can help ensure an organized approach. Use the outline to refer to throughout the task as a reminder of the steps to be completed.?   ?   Jennifer Dexter, PhD, LP?   Licensed Psychologist?     Psychological Testing    Billing/Services Summary    ?    Testing Evaluation Services  Base: 15417   (1st 60 mins)  Add-on: 97988   (each addtl 60 mins)    Record Review and Clarify Referral Question    (9:30/9:50), (6/20/24)  20 minutes    Integration/Report Generation    (3:00/4:00), (6/26/24) - MMPI-3  (12:00/1:00), (6/27/24) - Josy Scales  (11:00/12:00), (6/28/24) - Report Writing  60 minutes   60 minutes   60 minutes    Interactive Feedback Session   (11:20/11:40), (7/2/24)  20 minutes    Total Time:  220 minutes (3 hours, 40 minutes)    Total  Units:  1  3   ?    ?    Diagnoses: (ICD-10)   Other Specified Anxiety Disorder (F41.9)

## 2024-12-07 DIAGNOSIS — Z30.011 ENCOUNTER FOR INITIAL PRESCRIPTION OF CONTRACEPTIVE PILLS: ICD-10-CM

## 2024-12-09 RX ORDER — DESOGESTREL AND ETHINYL ESTRADIOL 0.15-0.03
KIT ORAL
Qty: 84 TABLET | Refills: 4 | OUTPATIENT
Start: 2024-12-09

## 2024-12-11 ENCOUNTER — TELEPHONE (OUTPATIENT)
Dept: FAMILY MEDICINE | Facility: CLINIC | Age: 24
End: 2024-12-11
Payer: COMMERCIAL

## 2024-12-11 NOTE — TELEPHONE ENCOUNTER
Prior Authorization Retail Medication Request    Medication/Dose: desogestrel-ethinyl estradiol (APRI) 0.15-30 MG-MCG tablet  Diagnosis and ICD code (if different than what is on RX):    New/renewal/insurance change PA/secondary ins. PA:  Previously Tried and Failed:    Rationale:      Insurance   Primary:   Insurance ID:  [id number]    Secondary (if applicable):  Insurance ID:      Pharmacy Information (if different than what is on RX)  Name:    Phone:  [phone]  Fax:    Clinic Information  Preferred routing pool for dept communication:

## 2024-12-13 NOTE — TELEPHONE ENCOUNTER
Central Prior Authorization Team - Phone: [phone]     PA Initiation    Medication: APRI 0.15-30 MG-MCG PO TABS  Insurance Company: [employer] [place] - Phone [phone] Fax [phone]  Pharmacy Filling the Rx: [id number] - [place] - [address] AT [facility] [address]  Filling Pharmacy Phone: [phone]  Filling Pharmacy Fax:    Start Date: 12/13/2024

## 2024-12-17 NOTE — TELEPHONE ENCOUNTER
Central Prior Authorization Team - Phone: [phone]     PRIOR AUTHORIZATION DENIED    Medication: APRI 0.15-30 MG-MCG PO TABS  Insurance Company: [employer] [place] - Phone [phone] Fax [phone]  Denial Date: 12/14/2024  Denial Reason(s):       Appeal Information:       Patient Notified: NO  Unfortunately, we cannot call the patient with denials because we do not know what next steps the MD will take nor can we give medical advice, please notify the patient of what they are to expect for the continuation of their therapy from the provider.

## 2024-12-18 NOTE — TELEPHONE ENCOUNTER
Triage Patient Outreach    Attempt # 1 and 2    Was call answered?  No.  Left voicemail to return call to [facility] at [facility] and  message sent    Lavinia Oneil RN

## 2024-12-18 NOTE — TELEPHONE ENCOUNTER
Please contact patient to see if she picked up her OCP.  It looks like her insurance will only cover the medication daily x 28 days and so they will not cover the way it is written for her to skip the placebo week.  If she has not yet picked this up, I can re write this for her to take the medication the traditional way ( 1 tablet daily and take the placebo pills.  Let me know her response

## 2025-01-06 ENCOUNTER — OFFICE VISIT (OUTPATIENT)
Dept: FAMILY MEDICINE | Facility: CLINIC | Age: 25
End: 2025-01-06
Payer: COMMERCIAL

## 2025-01-06 VITALS
TEMPERATURE: 98.4 F | RESPIRATION RATE: 11 BRPM | BODY MASS INDEX: 28.9 KG/M2 | OXYGEN SATURATION: 97 % | DIASTOLIC BLOOD PRESSURE: 88 MMHG | WEIGHT: 163.1 LBS | SYSTOLIC BLOOD PRESSURE: 125 MMHG | HEART RATE: 86 BPM | HEIGHT: 63 IN

## 2025-01-06 DIAGNOSIS — Z71.84 ENCOUNTER FOR COUNSELING FOR TRAVEL: Primary | ICD-10-CM

## 2025-01-06 DIAGNOSIS — Z23 NEED FOR IMMUNIZATION AGAINST TYPHOID: ICD-10-CM

## 2025-01-06 PROCEDURE — 90471 IMMUNIZATION ADMIN: CPT | Mod: GA | Performed by: PHYSICIAN ASSISTANT

## 2025-01-06 PROCEDURE — 90691 TYPHOID VACCINE IM: CPT | Mod: GA | Performed by: PHYSICIAN ASSISTANT

## 2025-01-06 PROCEDURE — 99401 PREV MED CNSL INDIV APPRX 15: CPT | Mod: 25 | Performed by: PHYSICIAN ASSISTANT

## 2025-01-06 RX ORDER — AZITHROMYCIN 500 MG/1
TABLET, FILM COATED ORAL
Qty: 6 TABLET | Refills: 0 | Status: SHIPPED | OUTPATIENT
Start: 2025-01-06

## 2025-01-06 NOTE — PATIENT INSTRUCTIONS
"See travel packet provided  Recommend ultrathon (mosquito repellant), pepto bismol and imodium  The food and drink choices you make while traveling can impact your likelihood of getting sick.   If you aren't sure if a food or drink is safe, the saying \" BOIL IT, COOK IT, PEEL IT, OR FORGET IT\" can help you decide whether it's okay to consume.   Also bring hand  and sun screen with you.  Safe Travels     If you first start to get mild to moderate diarrhea, take imodium.      If diarrhea is severe or you have a fever with the diarrhea, take the antibiotic (azithromycin).      Today January 6, 2025 you received the    Typhoid - injectable. This vaccine is valid for two years. .    These appointments can be made as a NURSE ONLY visit.    **It is very important for the vaccinations to be given on the scheduled day(s), this helps ensure you receive the full effectiveness of the vaccine.**    Please call [facility] with any questions [phone]    Thank you for visiting [facility]    "

## 2025-01-06 NOTE — PROGRESS NOTES
SUBJECTIVE: Geri Valadez , a 24 year old  female, presents for counseling and information regarding upcoming travel to [place]. Special medical concerns include: none. She anticipates the following unusual exposures: none.    Itinerary:  [place]    Departure Date: 1/9/25 Return date: 4/24/25    Reason for travel (i.e. Business, pleasure): business. work    Visiting an urban or rural area?: urban    Accommodations (i.e. hotel, hostel, friends, family, etc): hotel      IMMUNIZATION HISTORY  Have you received any vaccinations in the past 4 weeks? If so, which? No  Have you ever fainted from having your blood drawn or from an injection?  No  Have you ever had any bad reaction or side effect from any vaccination?  If so, which? No  Do you live (or work closely) with anyone who has AIDS, an AIDS-like condition, any other immune disorder or who is on chemotherapy for cancer?  No  Have you received any injection of immune globulin or any blood products during the past 12 months?  No    GENERAL MEDICAL HISTORY  Do you have a medical condition that requires medicine or doctor follow-up visits?  yes  Do you have a medical condition that is stable now, but that may recur while traveling?  No  Has your spleen been removed?  No  Have you had an illness or a fever in the past 48 hours?  No  Are you pregnant, or might you become pregnant on this trip?  Any chance of pregnancy?  No  Are you breastfeeding?  No  Do you have HIV, AIDS, an AIDS-like condition, any other immune disorder, leukemia or cancer?  yes  Have you had your thymus gland removed or history of problems with your thymus, such as myasthenia gravis, DiGeorge syndrome, or thymoma?  No  Do you have a severely low platelet count (thrombocytopenia) or a blood clotting disorder?  No  Have you ever had a convulsion, seizure, epilepsy, neurologic condition or brain infection?  No  Do you have any stomach conditions?  No  Do you have severe renal or kidney impairment?  No  Do you  have a history of mental health concerns?  No  Do you get yeast infections often?  No  Do you have psoriasis?  No  Do you get motion sickness?  No  Have you ever had headaches, nausea, vomiting, or breathing problems from altitude exposure?  No    MEDICINES  Are you taking:   Steroids, prednisone, anti-cancer drugs, or medicines that suppress your immune system? yes  Antibiotics or sulfonamides? No  Oral contraceptives (birth control pills)? yes  Aspirin therapy (children and teens)? No    ALLERGIES  Are you allergic to:  Any medicines? No  Any foods or other? No  Neomycin, formalin, or fish products? No      Past Medical History:   Diagnosis Date    Arthritis of ankle, right 02/14/2016    Initial evaluation 2/24/16 after 1.5 weeks of swelling, pain; elevated RF, CRP    Cyclic citrullinated peptide (CCP) antibody positive 03/07/2016    Elevated rheumatoid factor 02/24/2016    High total IgG 03/07/2016    GENE (juvenile idiopathic arthritis), polyarthritis, rheumatoid factor positive (H) 03/07/2016    Pneumonitis 01/01/2003    Positive antinuclear antibody 03/07/2016    Rheumatoid arthritis involving multiple sites with positive rheumatoid factor (H) 02/24/2016      Immunization History   Administered Date(s) Administered    COVID-19 12+ (Pfizer) 11/17/2023, 12/06/2024    COVID-19 Monovalent 18+ (Moderna) 03/17/2021, 04/14/2021, 01/13/2022    DTAP (<7y) 1999, 02/01/2000, 04/04/2000, 03/20/2001    HEPATITIS A (PEDS 12M-18Y) 10/22/2008, 08/26/2010    HPV 10/18/2019    HPV Quadrivalent 07/15/2014    Hepatitis B, Peds 1999, 1999, 10/02/2000    Historic Hib Hib-titer 1999, 02/01/2000, 04/04/2000, 03/20/2001    Influenza (H1N1) 12/03/2009    Influenza (IIV3) PF 10/22/2008, 09/23/2009, 10/07/2010    Influenza Vaccine >6 months,quad, PF 11/30/2016, 10/18/2019, 10/08/2020, 10/04/2021, 11/18/2022, 11/17/2023    Influenza Vaccine, 6+MO IM (QUADRIVALENT W/PRESERVATIVES) 01/18/2018    Influenza, Split  Virus, Trivalent, Pf (Fluzone\Fluarix) 12/06/2024    MMR 01/09/2001    Meningococcal ACWY (Menactra ) 08/07/2012, 10/18/2019    Meningococcal Mcv4 Conjugate,unspecified  10/18/2019    Pneumococcal (PCV 7) 01/09/2001, 03/20/2001    Pneumococcal 20 valent Conjugate (Prevnar 20) 12/06/2024    Poliovirus, inactivated (IPV) 1999, 03/20/2001    TDAP (Adacel,Boostrix) 08/07/2012, 11/18/2022    Varicella 01/09/2001, 10/22/2008       Current Outpatient Medications   Medication Sig Dispense Refill    desogestrel-ethinyl estradiol (APRI) 0.15-30 MG-MCG tablet Take 1 tablet daily 84 tablet 3    doxycycline hyclate (VIBRAMYCIN) 100 MG capsule Take 1 tablet daily starting 2 days prior to travel and continue during travel and for 4 weeks upon return 122 capsule 0    folic acid (FOLVITE) 1 MG tablet       methotrexate 2.5 MG tablet       tofacitinib (XELJANZ) 11 MG 24 hr tablet Take 1 tablet (11 mg) by mouth in the morning. PLEASE SCHEDULE AN APPOINTEMNT 30 tablet 0     No Known Allergies     EXAM: deferred    Immunizations discussed include: Typhoid  Malaria prophylaxis recommended: doxycycline (already prescribed by primary care provider)  Symptomatic treatment for traveler's diarrhea: bismuth subsalicylate, loperamide/diphenoxylate, and azithromycin    ASSESSMENT/PLAN:    (Z71.84) Encounter for counseling for travel  (primary encounter diagnosis)    Comment: Typhoid vaccines today. Patient will return or follow-up with PCP as needed. Prophylaxis given for Traveler's diarrhea and Malaria. All questions were answered.     Plan: azithromycin (ZITHROMAX) 500 MG tablet            (Z23) Need for immunization against typhoid  Comment:   Plan: TYPHOID VACCINE, IM              I have reviewed general recommendations for safe travel   including: food/water precautions, insect avoidance, safe sex   practices given high prevalence of HIV and other STDs,   roadway safety. Educational materials and links to the CDC   Traveler's health  website have been provided.    Total time 16 minutes, greater than 50 percent in counseling   and coordination of care.

## 2025-01-06 NOTE — NURSING NOTE
Prior to immunization administration, verified patients identity using patient s name and date of birth. Please see Immunization Activity for additional information.     Screening Questionnaire for Adult Immunization    Are you sick today?   No   Do you have allergies to medications, food, a vaccine component or latex?   No   Have you ever had a serious reaction after receiving a vaccination?   No   Do you have a long-term health problem with heart, lung, kidney, or metabolic disease (e.g., diabetes), asthma, a blood disorder, no spleen, complement component deficiency, a cochlear implant, or a spinal fluid leak?  Are you on long-term aspirin therapy?   No   Do you have cancer, leukemia, HIV/AIDS, or any other immune system problem?   No   Do you have a parent, brother, or sister with an immune system problem?   No   In the past 3 months, have you taken medications that affect  your immune system, such as prednisone, other steroids, or anticancer drugs; drugs for the treatment of rheumatoid arthritis, Crohn s disease, or psoriasis; or have you had radiation treatments?   Yes   Have you had a seizure, or a brain or other nervous system problem?   No   During the past year, have you received a transfusion of blood or blood    products, or been given immune (gamma) globulin or antiviral drug?   No   For women: Are you pregnant or is there a chance you could become       pregnant during the next month?   No   Have you received any vaccinations in the past 4 weeks?   No     Immunization questionnaire was positive for at least one answer.  Notified Omer Alvarado PA-C.      Patient instructed to remain in clinic for 15 minutes afterwards, and to report any adverse reactions.     Screening performed by Yanely Talamantes MA on 1/6/2025 at 7:26 AM.

## 2025-01-06 NOTE — PROGRESS NOTES
Prior to immunization administration, verified patients identity using patient s name and date of birth. Please see Immunization Activity for additional information.     Screening Questionnaire for Adult Immunization    Are you sick today?   No   Do you have allergies to medications, food, a vaccine component or latex?   No   Have you ever had a serious reaction after receiving a vaccination?   No   Do you have a long-term health problem with heart, lung, kidney, or metabolic disease (e.g., diabetes), asthma, a blood disorder, no spleen, complement component deficiency, a cochlear implant, or a spinal fluid leak?  Are you on long-term aspirin therapy?   No   Do you have cancer, leukemia, HIV/AIDS, or any other immune system problem?   No   Do you have a parent, brother, or sister with an immune system problem?   No   In the past 3 months, have you taken medications that affect  your immune system, such as prednisone, other steroids, or anticancer drugs; drugs for the treatment of rheumatoid arthritis, Crohn s disease, or psoriasis; or have you had radiation treatments?   No   Have you had a seizure, or a brain or other nervous system problem?   No   During the past year, have you received a transfusion of blood or blood    products, or been given immune (gamma) globulin or antiviral drug?   No   For women: Are you pregnant or is there a chance you could become       pregnant during the next month?   No   Have you received any vaccinations in the past 4 weeks?   Yes     Immunization questionnaire answers were all negative.      Patient instructed to remain in clinic for 15 minutes afterwards, and to report any adverse reactions.     Screening performed by Cathy Oconnor CMA on 1/6/2025 at 7:55 AM.

## 2025-05-31 ENCOUNTER — HEALTH MAINTENANCE LETTER (OUTPATIENT)
Age: 25
End: 2025-05-31